# Patient Record
Sex: MALE | Race: BLACK OR AFRICAN AMERICAN | Employment: OTHER | URBAN - METROPOLITAN AREA
[De-identification: names, ages, dates, MRNs, and addresses within clinical notes are randomized per-mention and may not be internally consistent; named-entity substitution may affect disease eponyms.]

---

## 2017-01-31 ENCOUNTER — OFFICE VISIT (OUTPATIENT)
Dept: ORTHOPEDIC SURGERY | Age: 57
End: 2017-01-31

## 2017-01-31 VITALS
OXYGEN SATURATION: 97 % | BODY MASS INDEX: 25.81 KG/M2 | HEART RATE: 82 BPM | SYSTOLIC BLOOD PRESSURE: 151 MMHG | DIASTOLIC BLOOD PRESSURE: 89 MMHG | RESPIRATION RATE: 18 BRPM | HEIGHT: 66 IN | WEIGHT: 160.6 LBS | TEMPERATURE: 98.8 F

## 2017-01-31 DIAGNOSIS — M48.061 LUMBAR SPINAL STENOSIS: Primary | ICD-10-CM

## 2017-01-31 DIAGNOSIS — Z98.1 S/P CERVICAL SPINAL FUSION: ICD-10-CM

## 2017-01-31 RX ORDER — TRAMADOL HYDROCHLORIDE 50 MG/1
50 TABLET ORAL
COMMUNITY
Start: 2011-03-27 | End: 2017-03-02

## 2017-01-31 RX ORDER — LISINOPRIL 20 MG/1
20 TABLET ORAL
COMMUNITY
Start: 2010-07-20 | End: 2017-03-02

## 2017-01-31 RX ORDER — OXYCODONE HYDROCHLORIDE 5 MG/1
TABLET ORAL
Qty: 60 TAB | Refills: 0 | Status: SHIPPED | OUTPATIENT
Start: 2017-01-31 | End: 2017-05-15 | Stop reason: SDUPTHER

## 2017-01-31 RX ORDER — CYCLOBENZAPRINE HCL 10 MG
10 TABLET ORAL
COMMUNITY
Start: 2011-03-27 | End: 2017-12-04 | Stop reason: SDUPTHER

## 2017-01-31 RX ORDER — AMLODIPINE BESYLATE 10 MG/1
10 TABLET ORAL
COMMUNITY
Start: 2010-07-20 | End: 2017-03-02

## 2017-01-31 RX ORDER — AMLODIPINE BESYLATE 5 MG/1
10 TABLET ORAL
COMMUNITY
End: 2017-03-02

## 2017-01-31 RX ORDER — HYDROCHLOROTHIAZIDE 25 MG/1
25 TABLET ORAL
COMMUNITY
Start: 2010-10-03 | End: 2017-03-02

## 2017-01-31 NOTE — PATIENT INSTRUCTIONS
[employer] Activation    Thank you for requesting access to [employer]. Please follow the instructions below to securely access and download your online medical record. [employer] allows you to send messages to your doctor, view your test results, renew your prescriptions, schedule appointments, and more. How Do I Sign Up? 1. In your internet browser, go to [email or web address]  2. Click on the First Time User? Click Here link in the Sign In box. You will be redirect to the New Member Sign Up page. 3. Enter your [employer] Access Code exactly as it appears below. You will not need to use this code after youve completed the sign-up process. If you do not sign up before the expiration date, you must request a new code. [employer] Access Code: [email or web address]  Expires: 3/30/2017  9:35 AM (This is the date your [employer] access code will )    4. Enter the last four digits of your Social Security Number (xxxx) and Date of Birth (mm/dd/yyyy) as indicated and click Submit. You will be taken to the next sign-up page. 5. Create a [employer]ics ID. This will be your [employer] login ID and cannot be changed, so think of one that is secure and easy to remember. 6. Create a [employer] password. You can change your password at any time. 7. Enter your Password Reset Question and Answer. This can be used at a later time if you forget your password. 8. Enter your e-mail address. You will receive e-mail notification when new information is available in [address] 9. Click Sign Up. You can now view and download portions of your medical record. 10. Click the Download Summary menu link to download a portable copy of your medical information. Additional Information    If you have questions, please visit the Frequently Asked Questions section of the [employer] website at [email or web address] Remember, [employer] is NOT to be used for urgent needs. For medical emergencies, dial 911.
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
## 2017-02-01 ENCOUNTER — TELEPHONE (OUTPATIENT)
Dept: ORTHOPEDIC SURGERY | Age: 57
End: 2017-02-01

## 2017-02-01 NOTE — TELEPHONE ENCOUNTER
Needs up dated handicap von. Patient  17. Patient will be bringing form by office will need it signed same day.

## 2017-02-01 NOTE — TELEPHONE ENCOUNTER
I do not see where we gave him a handicap placard in the past.  Please review record and see if you can find it.

## 2017-02-01 NOTE — TELEPHONE ENCOUNTER
Called pt and informed he can  DMV handicap placard application tomorrow after 12 pm (02/02/17 @ 12 pm). Pt verbalized understanding. Need to obtain NP Amie Cardona's signature tomorrow, placed on her desk to sign.

## 2017-02-01 NOTE — TELEPHONE ENCOUNTER
Actually on further review of the chart, it reveals he ambulates with a single point cane. Ok for temp handicap placard.

## 2017-02-01 NOTE — TELEPHONE ENCOUNTER
Reviewed record prior to sending message, no scanned copy found and no documentation noted. Office visit notes only reflects ambulatory status.

## 2017-02-01 NOTE — TELEPHONE ENCOUNTER
Called pt to inquire of who last provided him with the DMV handicap placard application. Pt stated Dr. Ale Adhikari with [employer] last gave him the placard application. Pt states he can no longer see her due to having Medicare now and he does not have a PCP at this time. He also stated he did not know that the placard had  when he saw Dr. Greg Rolle yesterday.

## 2017-02-02 NOTE — TELEPHONE ENCOUNTER
Received signed DMV handicap placard application from LEONIDAS Lira, placed at  ([facility]) for pt .

## 2017-03-07 ENCOUNTER — DOCUMENTATION ONLY (OUTPATIENT)
Dept: ORTHOPEDIC SURGERY | Age: 57
End: 2017-03-07

## 2017-03-07 ENCOUNTER — APPOINTMENT (OUTPATIENT)
Dept: GENERAL RADIOLOGY | Age: 57
End: 2017-03-07
Attending: PHYSICAL MEDICINE & REHABILITATION
Payer: MEDICARE

## 2017-03-07 ENCOUNTER — HOSPITAL ENCOUNTER (EMERGENCY)
Age: 57
Discharge: HOME | End: 2017-03-07
Attending: EMERGENCY MEDICINE
Payer: MEDICARE

## 2017-03-07 ENCOUNTER — HOSPITAL ENCOUNTER (OUTPATIENT)
Age: 57
Setting detail: OUTPATIENT SURGERY
Discharge: HOME | End: 2017-03-07
Attending: PHYSICAL MEDICINE & REHABILITATION | Admitting: PHYSICAL MEDICINE & REHABILITATION
Payer: MEDICARE

## 2017-03-07 VITALS
TEMPERATURE: 98 F | HEIGHT: 66 IN | SYSTOLIC BLOOD PRESSURE: 187 MMHG | WEIGHT: 160 LBS | BODY MASS INDEX: 25.71 KG/M2 | HEART RATE: 67 BPM | OXYGEN SATURATION: 99 % | RESPIRATION RATE: 16 BRPM | DIASTOLIC BLOOD PRESSURE: 110 MMHG

## 2017-03-07 VITALS
TEMPERATURE: 99.3 F | WEIGHT: 160 LBS | HEART RATE: 99 BPM | HEIGHT: 66 IN | BODY MASS INDEX: 25.71 KG/M2 | OXYGEN SATURATION: 98 % | SYSTOLIC BLOOD PRESSURE: 179 MMHG | RESPIRATION RATE: 16 BRPM | DIASTOLIC BLOOD PRESSURE: 99 MMHG

## 2017-03-07 DIAGNOSIS — I10 ESSENTIAL HYPERTENSION: Primary | ICD-10-CM

## 2017-03-07 PROCEDURE — 74011636320 HC RX REV CODE- 636/320

## 2017-03-07 PROCEDURE — 74011250636 HC RX REV CODE- 250/636

## 2017-03-07 PROCEDURE — 99282 EMERGENCY DEPT VISIT SF MDM: CPT

## 2017-03-07 PROCEDURE — 74011250637 HC RX REV CODE- 250/637: Performed by: EMERGENCY MEDICINE

## 2017-03-07 PROCEDURE — 74011000250 HC RX REV CODE- 250

## 2017-03-07 RX ORDER — METOPROLOL TARTRATE 50 MG/1
50 TABLET ORAL
Status: COMPLETED | OUTPATIENT
Start: 2017-03-07 | End: 2017-03-07

## 2017-03-07 RX ORDER — DIAZEPAM 5 MG/1
2.5-1 TABLET ORAL ONCE
Status: DISCONTINUED | OUTPATIENT
Start: 2017-03-07 | End: 2017-03-07 | Stop reason: HOSPADM

## 2017-03-07 RX ORDER — OXYCODONE AND ACETAMINOPHEN 5; 325 MG/1; MG/1
1 TABLET ORAL
Status: COMPLETED | OUTPATIENT
Start: 2017-03-07 | End: 2017-03-07

## 2017-03-07 RX ADMIN — METOPROLOL TARTRATE 50 MG: 50 TABLET ORAL at 15:04

## 2017-03-07 RX ADMIN — OXYCODONE HYDROCHLORIDE AND ACETAMINOPHEN 1 TABLET: 5; 325 TABLET ORAL at 16:22

## 2017-03-07 NOTE — PROGRESS NOTES
ALEXANDER Jeffrey, evaluated the patient in block suite, and spoke with ED concerning light headedness and dizziness associated with his HTN. Ronny Oleary

## 2017-03-07 NOTE — ED TRIAGE NOTES
Patient to ER with c/o HTN. States he was at the [facility], was going to get a spinal injection for chronic back pain, but his BP was too elevated, so he was sent here. Patient denies any associated symptoms. Ambulatory. Allergies reviewed.
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
## 2017-03-07 NOTE — PROGRESS NOTES
ER doctor from  called stating he wanted to talk to Dr. Candice Fox regarding the pt's blood pressure as he was sent to the ER from the block suite today. His b/p was elevated and could not have the block.     Called the block suite and left message with the nurse to have Dr Candice Fox call the ER doctor at [phone]
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
## 2017-03-07 NOTE — DISCHARGE INSTRUCTIONS
[facility]   ([facility])  Dr. Yun Oconnor, Dr. Yandy Franco, Dr. Vanessa Whitley not drive a car, operate heavy machinery or dangerous equipment for 24 hours. * Activity as tolerated; rest for the remainder of the day. * Resume pre-block medications including those for your family doctor. * Do not drink alcoholic beverages for 24 hours. Alcohol and the medications you have received may interact and cause an adverse reaction. * You may feel better this evening and worse tomorrow, as the numbing medications wears off and the steroid has yet to begin to work. After 48 hrs the steroid should begin to release bringing you relief. * You may shower this evening and remove any bandages. * Avoid hot tubs and heating pads for 24 hours. You may use cold packs on the procedure site as tolerated for the first 24 hours. * If a headache develops, drink plenty of fluids and rest.  Take over the counter medications for headache if needed. If the headache continues longer than 24 hours, call MD at the [address] [place] [address]. [phone]    * Continue taking pain medications as needed. * You may resume your regular diet if tolerated. Otherwise, start with sips of water and advance slowly. * If Diabetic: check your blood sugar three times a day for the next 3 days. If your sugar is greater than 300 call your family doctor. If your sugar is greater than 400, have someone transport you to the nearest Emergency Room. * If you experience any of the following problems, Please Call the [address] [place] [address] at [phone].         * Shortness of Breath    * Fever of 101 or higher    * Nausea / Vomiting    * Severe Headache    * Weakness or numbness in arms or legs that is not      resolving    * Prolonged increase in pain greater than 4 days      DISCHARGE SUMMARY from Nurse      PATIENT INSTRUCTIONS:    After oral sedation, for 24 hours or while taking prescription Narcotics:  · Limit your activities  · Do not drive and operate hazardous machinery  · Do not make important personal or business decisions  · Do  not drink alcoholic beverages  · If you have not urinated within 8 hours after discharge, please contact your surgeon on call. Report the following to your surgeon:  · Excessive pain, swelling, redness or odor of or around the surgical area  · Temperature over 101  · Nausea and vomiting lasting longer than 4 hours or if unable to take medications  · Any signs of decreased circulation or nerve impairment to extremity: change in color, persistent  numbness, tingling, coldness or increase pain  · Any questions            What to do at Home:  Recommended activity: Activity as tolerated, NO DRIVING FOR 12 Hours post injection          *  Please give a list of your current medications to your Primary Care Provider. *  Please update this list whenever your medications are discontinued, doses are      changed, or new medications (including over-the-counter products) are added. *  Please carry medication information at all times in case of emergency situations. These are general instructions for a healthy lifestyle:    No smoking/ No tobacco products/ Avoid exposure to second hand smoke    Surgeon General's Warning:  Quitting smoking now greatly reduces serious risk to your health. Obesity, smoking, and sedentary lifestyle greatly increases your risk for illness    A healthy diet, regular physical exercise & weight monitoring are important for maintaining a healthy lifestyle    You may be retaining fluid if you have a history of heart failure or if you experience any of the following symptoms:  Weight gain of 3 pounds or more overnight or 5 pounds in a week, increased swelling in our hands or feet or shortness of breath while lying flat in bed.   Please call your doctor as soon as you notice any of these symptoms; do not wait until your next office visit. Recognize signs and symptoms of STROKE:    F-face looks uneven    A-arms unable to move or move unevenly    S-speech slurred or non-existent    T-time-call 911 as soon as signs and symptoms begin-DO NOT go       Back to bed or wait to see if you get better-TIME IS BRAIN.

## 2017-03-07 NOTE — ED NOTES
Dr Abbe Figueroa notified of patient's blood pressure reading. Doctor Abbe Figueroa states to go ahead and discharge patient.

## 2017-03-07 NOTE — PERIOP NOTES
Pt blood pressure taken in preop. Blood pressures are as follows: 172/114, 179/99, and 160/105. Pt reports taking his blood pressure medication this am. He also reports feeling dizzy. Pt advised to go to the emergency room.

## 2017-03-07 NOTE — DISCHARGE INSTRUCTIONS

## 2017-03-08 ENCOUNTER — PATIENT OUTREACH (OUTPATIENT)
Dept: FAMILY MEDICINE CLINIC | Age: 57
End: 2017-03-08

## 2017-03-08 NOTE — PROGRESS NOTES
ED Follow Up Call    Called patient on 3/8/17 for discharge from [facility] for Essential hypertension. Patient reports he still feel a little dizzy and lightheaded by better from yesterday. Pt is taking his BP medication. Not prescribed anything new by the ED. Notified pt that because he has Medicare, we cannot be his PCP anymore. Gave pt numbers to several [facility] PCP offices in his area to connect with a new PCP. Discussed and reviewed ED discharge plan with patient.

## 2017-03-09 ENCOUNTER — HOSPITAL ENCOUNTER (OUTPATIENT)
Dept: LAB | Age: 57
Discharge: HOME | End: 2017-03-09
Payer: MEDICARE

## 2017-03-09 DIAGNOSIS — Z00.01 ENCOUNTER FOR GENERAL ADULT MEDICAL EXAMINATION WITH ABNORMAL FINDINGS: ICD-10-CM

## 2017-03-09 DIAGNOSIS — E78.00 HYPERCHOLESTEROLEMIA: ICD-10-CM

## 2017-03-09 DIAGNOSIS — M43.02 SPONDYLOLYSIS OF CERVICAL REGION: ICD-10-CM

## 2017-03-09 DIAGNOSIS — M51.37 DEGENERATION OF LUMBAR OR LUMBOSACRAL INTERVERTEBRAL DISC: ICD-10-CM

## 2017-03-09 DIAGNOSIS — K21.9 ESOPHAGEAL REFLUX: ICD-10-CM

## 2017-03-09 DIAGNOSIS — I10 ESSENTIAL HYPERTENSION, BENIGN: ICD-10-CM

## 2017-03-09 LAB
ALBUMIN SERPL BCP-MCNC: 3.8 G/DL (ref 3.4–5)
ALBUMIN/GLOB SERPL: 1.1 {RATIO} (ref 0.8–1.7)
ALP SERPL-CCNC: 80 U/L (ref 45–117)
ALT SERPL-CCNC: 85 U/L (ref 16–61)
ANION GAP BLD CALC-SCNC: 6 MMOL/L (ref 3–18)
AST SERPL W P-5'-P-CCNC: 72 U/L (ref 15–37)
BASOPHILS # BLD AUTO: 0 K/UL (ref 0–0.1)
BASOPHILS # BLD: 1 % (ref 0–2)
BILIRUB SERPL-MCNC: 1.5 MG/DL (ref 0.2–1)
BUN SERPL-MCNC: 6 MG/DL (ref 7–18)
BUN/CREAT SERPL: 7 (ref 12–20)
CALCIUM SERPL-MCNC: 9.5 MG/DL (ref 8.5–10.1)
CHLORIDE SERPL-SCNC: 96 MMOL/L (ref 100–108)
CHOLEST SERPL-MCNC: 187 MG/DL
CO2 SERPL-SCNC: 34 MMOL/L (ref 21–32)
CREAT SERPL-MCNC: 0.86 MG/DL (ref 0.6–1.3)
DIFFERENTIAL METHOD BLD: ABNORMAL
EOSINOPHIL # BLD: 0.3 K/UL (ref 0–0.4)
EOSINOPHIL NFR BLD: 3 % (ref 0–5)
ERYTHROCYTE [DISTWIDTH] IN BLOOD BY AUTOMATED COUNT: 13.6 % (ref 11.6–14.5)
GLOBULIN SER CALC-MCNC: 3.5 G/DL (ref 2–4)
GLUCOSE SERPL-MCNC: 146 MG/DL (ref 74–99)
HCT VFR BLD AUTO: 43.5 % (ref 36–48)
HDLC SERPL-MCNC: 67 MG/DL (ref 40–60)
HDLC SERPL: 2.8 {RATIO} (ref 0–5)
HGB BLD-MCNC: 15.2 G/DL (ref 13–16)
LDLC SERPL CALC-MCNC: 109.4 MG/DL (ref 0–100)
LIPID PROFILE,FLP: ABNORMAL
LYMPHOCYTES # BLD AUTO: 35 % (ref 21–52)
LYMPHOCYTES # BLD: 2.9 K/UL (ref 0.9–3.6)
MCH RBC QN AUTO: 32.6 PG (ref 24–34)
MCHC RBC AUTO-ENTMCNC: 34.9 G/DL (ref 31–37)
MCV RBC AUTO: 93.3 FL (ref 74–97)
MONOCYTES # BLD: 0.7 K/UL (ref 0.05–1.2)
MONOCYTES NFR BLD AUTO: 9 % (ref 3–10)
NEUTS SEG # BLD: 4.4 K/UL (ref 1.8–8)
NEUTS SEG NFR BLD AUTO: 52 % (ref 40–73)
PLATELET # BLD AUTO: 177 K/UL (ref 135–420)
PMV BLD AUTO: 10.9 FL (ref 9.2–11.8)
POTASSIUM SERPL-SCNC: 4.3 MMOL/L (ref 3.5–5.5)
PROT SERPL-MCNC: 7.3 G/DL (ref 6.4–8.2)
RBC # BLD AUTO: 4.66 M/UL (ref 4.7–5.5)
SODIUM SERPL-SCNC: 136 MMOL/L (ref 136–145)
T4 SERPL-MCNC: 12.7 UG/DL (ref 4.5–10.9)
TRIGL SERPL-MCNC: 53 MG/DL (ref ?–150)
TSH SERPL DL<=0.05 MIU/L-ACNC: 1.42 UIU/ML (ref 0.36–3.74)
VLDLC SERPL CALC-MCNC: 10.6 MG/DL
WBC # BLD AUTO: 8.3 K/UL (ref 4.6–13.2)

## 2017-03-09 PROCEDURE — 80061 LIPID PANEL: CPT | Performed by: INTERNAL MEDICINE

## 2017-03-09 PROCEDURE — 36415 COLL VENOUS BLD VENIPUNCTURE: CPT | Performed by: INTERNAL MEDICINE

## 2017-03-09 PROCEDURE — 80053 COMPREHEN METABOLIC PANEL: CPT | Performed by: INTERNAL MEDICINE

## 2017-03-09 PROCEDURE — 84436 ASSAY OF TOTAL THYROXINE: CPT | Performed by: INTERNAL MEDICINE

## 2017-03-09 PROCEDURE — 84443 ASSAY THYROID STIM HORMONE: CPT | Performed by: INTERNAL MEDICINE

## 2017-03-09 PROCEDURE — 85025 COMPLETE CBC W/AUTO DIFF WBC: CPT | Performed by: INTERNAL MEDICINE

## 2017-03-14 ENCOUNTER — APPOINTMENT (OUTPATIENT)
Dept: GENERAL RADIOLOGY | Age: 57
End: 2017-03-14
Attending: PHYSICAL MEDICINE & REHABILITATION
Payer: MEDICARE

## 2017-03-14 ENCOUNTER — SURGERY (OUTPATIENT)
Age: 57
End: 2017-03-14

## 2017-03-14 ENCOUNTER — HOSPITAL ENCOUNTER (OUTPATIENT)
Age: 57
Setting detail: OUTPATIENT SURGERY
Discharge: HOME | End: 2017-03-14
Attending: PHYSICAL MEDICINE & REHABILITATION | Admitting: PHYSICAL MEDICINE & REHABILITATION
Payer: MEDICARE

## 2017-03-14 VITALS
OXYGEN SATURATION: 99 % | SYSTOLIC BLOOD PRESSURE: 145 MMHG | HEIGHT: 66 IN | RESPIRATION RATE: 18 BRPM | TEMPERATURE: 98.8 F | DIASTOLIC BLOOD PRESSURE: 89 MMHG | WEIGHT: 160 LBS | HEART RATE: 103 BPM | BODY MASS INDEX: 25.71 KG/M2

## 2017-03-14 PROCEDURE — 77030014124 HC TY EPDRL BBMI -A: Performed by: PHYSICAL MEDICINE & REHABILITATION

## 2017-03-14 PROCEDURE — 74011000250 HC RX REV CODE- 250: Performed by: PHYSICAL MEDICINE & REHABILITATION

## 2017-03-14 PROCEDURE — 74011250636 HC RX REV CODE- 250/636: Performed by: PHYSICAL MEDICINE & REHABILITATION

## 2017-03-14 PROCEDURE — 74011250637 HC RX REV CODE- 250/637: Performed by: PHYSICAL MEDICINE & REHABILITATION

## 2017-03-14 PROCEDURE — 74011636320 HC RX REV CODE- 636/320

## 2017-03-14 PROCEDURE — 74011636320 HC RX REV CODE- 636/320: Performed by: PHYSICAL MEDICINE & REHABILITATION

## 2017-03-14 PROCEDURE — 76010000009 HC PAIN MGT 0 TO 30 MIN PROC: Performed by: PHYSICAL MEDICINE & REHABILITATION

## 2017-03-14 PROCEDURE — 74011250636 HC RX REV CODE- 250/636

## 2017-03-14 PROCEDURE — 74011000250 HC RX REV CODE- 250

## 2017-03-14 RX ORDER — LIDOCAINE HYDROCHLORIDE 10 MG/ML
INJECTION, SOLUTION EPIDURAL; INFILTRATION; INTRACAUDAL; PERINEURAL AS NEEDED
Status: DISCONTINUED | OUTPATIENT
Start: 2017-03-14 | End: 2017-03-14 | Stop reason: HOSPADM

## 2017-03-14 RX ORDER — DIAZEPAM 5 MG/1
5-20 TABLET ORAL ONCE
Status: COMPLETED | OUTPATIENT
Start: 2017-03-14 | End: 2017-03-14

## 2017-03-14 RX ORDER — DEXAMETHASONE SODIUM PHOSPHATE 100 MG/10ML
INJECTION INTRAMUSCULAR; INTRAVENOUS AS NEEDED
Status: DISCONTINUED | OUTPATIENT
Start: 2017-03-14 | End: 2017-03-14 | Stop reason: HOSPADM

## 2017-03-14 RX ORDER — SODIUM CHLORIDE 0.9 % (FLUSH) 0.9 %
5-10 SYRINGE (ML) INJECTION AS NEEDED
Status: DISCONTINUED | OUTPATIENT
Start: 2017-03-14 | End: 2017-03-14 | Stop reason: HOSPADM

## 2017-03-14 RX ADMIN — DEXAMETHASONE SODIUM PHOSPHATE 30 MG: 10 INJECTION INTRAMUSCULAR; INTRAVENOUS at 15:47

## 2017-03-14 RX ADMIN — IOPAMIDOL 2 ML: 408 INJECTION, SOLUTION INTRATHECAL at 15:47

## 2017-03-14 RX ADMIN — DIAZEPAM 5 MG: 5 TABLET ORAL at 14:38

## 2017-03-14 RX ADMIN — LIDOCAINE HYDROCHLORIDE 8 ML: 10 INJECTION, SOLUTION EPIDURAL; INFILTRATION; INTRACAUDAL; PERINEURAL at 15:46

## 2017-03-14 NOTE — INTERVAL H&P NOTE
H&P Update:  Debra Ramos. was seen and briefly examined. ED events from last week reviewed. Asymptomatic today. Will proceed with BO. History and physical has been reviewed. There have been no significant clinical changes since the completion of the originally dated History and Physical other than noted above.     Signed By: Avani Barrios MD     March 14, 2017 3:19 PM

## 2017-03-14 NOTE — PROCEDURES
Intralaminar Epidural Steroid Procedure Note        Patient Name   Claudia Mattson. Date of Procedure: March 14, 2017    Preoperative Diagnosis: Bilateral stenosis of lateral recess of lumbar spine [M48.06]    Postoperative Diagnosis: Same      Procedure:  Epidural Steroid Injection    Consent:  Informed consent was obtained prior to the procedure. In addition to the potential risks associated with the procedure itself, the patient was informed both verbally and in writing of the potential side effects of the use of glucocorticoid. The patient appeared to comprehend the informed consent and desired to have the procedure performed. Procedure in Detail:  The patient was taken to the procedure suite and placed in the prone position on the operating table on appropriate padding. The posterior lumbar region was prepped and draped in the usual sterile fashion. Intraoperative fluoroscopy was used to localize the L2-L3 interspace. The skin was infiltrated with 1% lidocaine. An 18-gauge Tuohy needle was advanced into the epidural space at L2-L3 under fluoroscopic guidance using the loss of resistance technique. No cerebrospinal fluid was seen throughout the procedure. Yes  A small amount of Isovue was injected into the epidural space, confirming appropriated needle placement on fluoroscopy. No vascular uptake was identified. Next, 2ml of 1% Lidocaine and 30mg of preservative free Dexamethasone were injected via the Tuohy needle. The needle was removed from the patient. The patient tolerated the procedure well and was discharged home with designated  and care instructions.       Signed By: Kvng Rao MD                        March 14, 2017

## 2017-03-14 NOTE — DISCHARGE INSTRUCTIONS
[facility]   ([facility])  Dr. Aranza Fregoso, Dr. Sam Duncan, Dr. Vannessa Diaz not drive a car, operate heavy machinery or dangerous equipment for 24 hours. * Activity as tolerated; rest for the remainder of the day. * Resume pre-block medications including those for your family doctor. * Do not drink alcoholic beverages for 24 hours. Alcohol and the medications you have received may interact and cause an adverse reaction. * You may feel better this evening and worse tomorrow, as the numbing medications wears off and the steroid has yet to begin to work. After 48 hrs the steroid should begin to release bringing you relief. * You may shower this evening and remove any bandages. * Avoid hot tubs and heating pads for 24 hours. You may use cold packs on the procedure site as tolerated for the first 24 hours. * If a headache develops, drink plenty of fluids and rest.  Take over the counter medications for headache if needed. If the headache continues longer than 24 hours, call MD at the [address] [place] [address]. [phone]    * Continue taking pain medications as needed. * You may resume your regular diet if tolerated. Otherwise, start with sips of water and advance slowly. * If Diabetic: check your blood sugar three times a day for the next 3 days. If your sugar is greater than 300 call your family doctor. If your sugar is greater than 400, have someone transport you to the nearest Emergency Room. * If you experience any of the following problems, Please Call the [address] [place] [address] at [phone].         * Shortness of Breath    * Fever of 101 or higher    * Nausea / Vomiting    * Severe Headache    * Weakness or numbness in arms or legs that is not      resolving    * Prolonged increase in pain greater than 4 days      DISCHARGE SUMMARY from Nurse      PATIENT INSTRUCTIONS:    After oral sedation, for 24 hours or while taking prescription Narcotics:  · Limit your activities  · Do not drive and operate hazardous machinery  · Do not make important personal or business decisions  · Do  not drink alcoholic beverages  · If you have not urinated within 8 hours after discharge, please contact your surgeon on call. Report the following to your surgeon:  · Excessive pain, swelling, redness or odor of or around the surgical area  · Temperature over 101  · Nausea and vomiting lasting longer than 4 hours or if unable to take medications  · Any signs of decreased circulation or nerve impairment to extremity: change in color, persistent  numbness, tingling, coldness or increase pain  · Any questions            What to do at Home:  Recommended activity: Activity as tolerated, NO DRIVING FOR 12 Hours post injection          *  Please give a list of your current medications to your Primary Care Provider. *  Please update this list whenever your medications are discontinued, doses are      changed, or new medications (including over-the-counter products) are added. *  Please carry medication information at all times in case of emergency situations. These are general instructions for a healthy lifestyle:    No smoking/ No tobacco products/ Avoid exposure to second hand smoke    Surgeon General's Warning:  Quitting smoking now greatly reduces serious risk to your health. Obesity, smoking, and sedentary lifestyle greatly increases your risk for illness    A healthy diet, regular physical exercise & weight monitoring are important for maintaining a healthy lifestyle    You may be retaining fluid if you have a history of heart failure or if you experience any of the following symptoms:  Weight gain of 3 pounds or more overnight or 5 pounds in a week, increased swelling in our hands or feet or shortness of breath while lying flat in bed.   Please call your doctor as soon as you notice any of these symptoms; do not wait until your next office visit. Recognize signs and symptoms of STROKE:    F-face looks uneven    A-arms unable to move or move unevenly    S-speech slurred or non-existent    T-time-call 911 as soon as signs and symptoms begin-DO NOT go       Back to bed or wait to see if you get better-TIME IS BRAIN. MyChart Activation    Thank you for requesting access to [employer]. Please follow the instructions below to securely access and download your online medical record. [employer] allows you to send messages to your doctor, view your test results, renew your prescriptions, schedule appointments, and more. How Do I Sign Up? 1. In your internet browser, go to [email or web address]  2. Click on the First Time User? Click Here link in the Sign In box. You will be redirect to the New Member Sign Up page. 3. Enter your [employer] Access Code exactly as it appears below. You will not need to use this code after youve completed the sign-up process. If you do not sign up before the expiration date, you must request a new code. [employer] Access Code: [email or web address]  Expires: 3/30/2017 10:35 AM (This is the date your [employer] access code will )    4. Enter the last four digits of your Social Security Number (xxxx) and Date of Birth (mm/dd/yyyy) as indicated and click Submit. You will be taken to the next sign-up page. 5. Create a [employer] ID. This will be your [employer] login ID and cannot be changed, so think of one that is secure and easy to remember. 6. Create a [employer] password. You can change your password at any time. 7. Enter your Password Reset Question and Answer. This can be used at a later time if you forget your password. 8. Enter your e-mail address. You will receive e-mail notification when new information is available in [address] 9. Click Sign Up. You can now view and download portions of your medical record.   10. Click the Download Summary menu link to download a portable copy of your medical information. Additional Information    If you have questions, please visit the Frequently Asked Questions section of the [employer] website at [email or web address] Remember, [employer] is NOT to be used for urgent needs. For medical emergencies, dial 911.
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
## 2017-05-15 ENCOUNTER — OFFICE VISIT (OUTPATIENT)
Dept: ORTHOPEDIC SURGERY | Age: 57
End: 2017-05-15

## 2017-05-15 VITALS
TEMPERATURE: 98.8 F | RESPIRATION RATE: 18 BRPM | HEIGHT: 66 IN | BODY MASS INDEX: 25.71 KG/M2 | HEART RATE: 97 BPM | WEIGHT: 160 LBS | DIASTOLIC BLOOD PRESSURE: 86 MMHG | SYSTOLIC BLOOD PRESSURE: 143 MMHG

## 2017-05-15 DIAGNOSIS — M54.2 NECK PAIN: ICD-10-CM

## 2017-05-15 DIAGNOSIS — M48.062 LUMBAR STENOSIS WITH NEUROGENIC CLAUDICATION: Primary | ICD-10-CM

## 2017-05-15 DIAGNOSIS — Z98.1 S/P CERVICAL SPINAL FUSION: ICD-10-CM

## 2017-05-15 RX ORDER — DULOXETIN HYDROCHLORIDE 30 MG/1
30 CAPSULE, DELAYED RELEASE ORAL
Qty: 30 CAP | Refills: 2 | Status: SHIPPED | OUTPATIENT
Start: 2017-05-15 | End: 2017-07-05 | Stop reason: SDUPTHER

## 2017-05-15 RX ORDER — OXYCODONE HYDROCHLORIDE 5 MG/1
TABLET ORAL
Qty: 60 TAB | Refills: 0 | Status: SHIPPED | OUTPATIENT
Start: 2017-05-15 | End: 2017-07-05 | Stop reason: SDUPTHER

## 2017-05-15 NOTE — PROGRESS NOTES
Verbal order entered per Dr. Susi Cheatham as documented on blue sheet:Cymbalta 30mg take 1 tab PO with dinner
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
## 2017-05-15 NOTE — PATIENT INSTRUCTIONS
Deciding About Surgery for Lumbar Spinal Stenosis  What is lumbar spinal stenosis? Lumbar spinal stenosis is the narrowing of the spinal canal in the lower back. This occurs when bone and other tissues grow inside the openings in the spinal bones. This can squeeze the nerves that branch out from the spinal cord. The squeezing can cause pain, numbness, or weakness. It happens most often in the legs, feet, or buttocks. You may choose to have surgery to ease your symptoms. Or you may try other treatments instead. These include medicines, exercise, and physical therapy. What are key points about this decision? · If your symptoms are mild to moderate, then medicine, physical therapy, and exercise may be all you need. · You may want surgery if you have tried other treatment for a while and your symptoms are still so bad that you can't do your normal activities. · Your symptoms may come back after a few years. You may need surgery again. · Surgery will most likely help leg pain. But it may not help back pain as much. Why might you choose to have surgery? · Surgery can relieve pain. It can also improve how well you can walk. · You have tried other treatment for a while and your symptoms are still so bad that you can't do your normal activities. · Without surgery, your symptoms may still bother you. If symptoms are very painful, they most often will not improve on their own. · Your doctor may advise surgery if you can't control your bladder or bowels as well as you used to. Surgery is also an option if you notice sudden changes in how well you can walk or you are more clumsy than before. Why might you choose not to have surgery? · You may try other treatments to help your symptoms. These include medicine, exercise, and physical therapy. These other treatments work best for people with mild to moderate symptoms. · Risks from surgery include nerve injury and tissue tears.  And you could have chronic pain, trouble passing urine, and a spine that is not stable. · All surgery has some risks. These include bleeding, infection, and risks from anesthesia. · You may not be able to return to all of your normal activities for many months. · Your symptoms may come back in a few years. You may need surgery again. Your decision  Thinking about the facts and your feelings can help you make a decision that is right for you. Be sure you understand the benefits and risks of your options, and think about what else you need to do before you make the decision. Where can you learn more? Go to http://zulma-susana.info/. Enter Y784 in the search box to learn more about \"Deciding About Surgery for Lumbar Spinal Stenosis. \"  Current as of: May 23, 2016  Content Version: 11.2  © 0656-7805 [employer]. Care instructions adapted under license by [employer] (which disclaims liability or warranty for this information). If you have questions about a medical condition or this instruction, always ask your healthcare professional. [place] [address] any warranty or liability for your use of this information.

## 2017-06-13 ENCOUNTER — APPOINTMENT (OUTPATIENT)
Dept: GENERAL RADIOLOGY | Age: 57
End: 2017-06-13
Attending: PHYSICAL MEDICINE & REHABILITATION
Payer: MEDICARE

## 2017-06-13 ENCOUNTER — HOSPITAL ENCOUNTER (OUTPATIENT)
Age: 57
Setting detail: OUTPATIENT SURGERY
Discharge: HOME | End: 2017-06-13
Attending: PHYSICAL MEDICINE & REHABILITATION | Admitting: PHYSICAL MEDICINE & REHABILITATION
Payer: MEDICARE

## 2017-06-13 VITALS
DIASTOLIC BLOOD PRESSURE: 101 MMHG | TEMPERATURE: 99.1 F | BODY MASS INDEX: 25.71 KG/M2 | RESPIRATION RATE: 18 BRPM | OXYGEN SATURATION: 100 % | HEART RATE: 108 BPM | HEIGHT: 66 IN | SYSTOLIC BLOOD PRESSURE: 154 MMHG | WEIGHT: 160 LBS

## 2017-06-13 PROCEDURE — 74011250636 HC RX REV CODE- 250/636

## 2017-06-13 PROCEDURE — 74011000250 HC RX REV CODE- 250

## 2017-06-13 PROCEDURE — 77030014124 HC TY EPDRL BBMI -A: Performed by: PHYSICAL MEDICINE & REHABILITATION

## 2017-06-13 PROCEDURE — 74011250637 HC RX REV CODE- 250/637: Performed by: PHYSICAL MEDICINE & REHABILITATION

## 2017-06-13 PROCEDURE — 74011636320 HC RX REV CODE- 636/320

## 2017-06-13 PROCEDURE — 76010000009 HC PAIN MGT 0 TO 30 MIN PROC: Performed by: PHYSICAL MEDICINE & REHABILITATION

## 2017-06-13 RX ORDER — DIAZEPAM 5 MG/1
2.5-1 TABLET ORAL ONCE
Status: COMPLETED | OUTPATIENT
Start: 2017-06-13 | End: 2017-06-13

## 2017-06-13 RX ORDER — DEXAMETHASONE SODIUM PHOSPHATE 100 MG/10ML
INJECTION INTRAMUSCULAR; INTRAVENOUS AS NEEDED
Status: DISCONTINUED | OUTPATIENT
Start: 2017-06-13 | End: 2017-06-13 | Stop reason: HOSPADM

## 2017-06-13 RX ORDER — LIDOCAINE HYDROCHLORIDE 10 MG/ML
INJECTION, SOLUTION EPIDURAL; INFILTRATION; INTRACAUDAL; PERINEURAL AS NEEDED
Status: DISCONTINUED | OUTPATIENT
Start: 2017-06-13 | End: 2017-06-13 | Stop reason: HOSPADM

## 2017-06-13 RX ADMIN — DIAZEPAM 5 MG: 5 TABLET ORAL at 12:51

## 2017-06-13 NOTE — H&P (VIEW-ONLY)
[address]  [address], [address]  [place]  Phone: [phone]  Fax: [phone]        [facility]  : 1960  PCP: No primary care provider on file. PROGRESS NOTE      ASSESSMENT AND PLAN    Emma Torres was seen today for back pain. Diagnoses and all orders for this visit:    Lumbar stenosis with neurogenic claudication  -     oxyCODONE IR (ROXICODONE) 5 mg immediate release tablet; Take 1 tab by mouth 1-2 times a day as needed  for pain. S/P cervical spinal fusion- 1 year f/u    Neck pain  -     [86468] C Spine 2-3V    Other orders  -     DULoxetine (CYMBALTA) 30 mg capsule; Take 1 Cap by mouth daily (with dinner). -     SCHEDULE SURGERY    1. Trial of Cymbalta. 2. Rx for Roxicodone. 3. Set up for BO at L2-3.   4. Given surgical information for stenosis     Follow-up Disposition:  Return in about 6 weeks (around 2017) for med f/u, adjustment. HISTORY OF PRESENT ILLNESS  Joey November. is a 60 y.o. male. Pt presents to the office for a f/u visit for back pain. He has known severe stenosis and had an epidural 2 months ago at L2-3. He is 1 year out of his cervical fusion. Pt reports that his epidural did relieve some RLE pain and allowed him to ambulate better but did not give him any benefit to his back pain. He tolerated the injection well without negative side effects. Pt continues to have back pain. He notes that his back pain has been uncontrolled lately. He has been experiencing shooting pain into his RLE. He has a short standing and walking tolerance due to his pain. Pt has noticed weakness in his RLE lately. Pt reports that he has to ambulate with his cane as his RLE will go out on him with prolonged ambulation. His RLE pain is mainly felt in his calf. No saddle paresthesia. Pt was given Roxicodone 5 mg by Dr. Rosana Araujo a few months ago. Takes only prn. This gave him benefit but he has run out.  He has been given Roxicodone 10 mg by NP Mina Velez in the past. Pt denies any dizziness, confusion, uncontrolled constipation, and cravings due to controlled substances. Denies persistent fevers, chills, weight changes, neurogenic bowel or bladder symptoms. Pt denies recent ED visits or hospitalizations.  reviewed. Last fill of Roxicodone 5 mg was in 1/2017 by Dr. Lyla Herrera. Surgery for lumbar stenosis has been considered for patient. Pt wants to hold off, had cervical fusion last yr complicated by hematoma. Pain Assessment  5/15/2017   Location of Pain Back   Pain Location Comment -   Severity of Pain 9   Quality of Pain Aching   Quality of Pain Comment numbness and tingling in left hand   Duration of Pain Persistent   Frequency of Pain Constant   Aggravating Factors Other (Comment)   Aggravating Factors Comment denies aggravating factors   Limiting Behavior Some   Relieving Factors Nothing   Relieving Factors Comment -   Result of Injury No       PAST MEDICAL HISTORY   Past Medical History:   Diagnosis Date    Diabetes ([address]     pre diabetic    Hypertension     Liver disease     Hepatitis C    Low back pain     Lumbar stenosis     L3-L4, L4-L5    Migraine headache     Numbness and tingling in left hand     Radiculopathy     Right L4-L5    Right leg pain     Sensation of cold in leg     Radiating into right posterior buttock and anterior thigh    Unsteady gait        Past Surgical History:   Procedure Laterality Date    HX CERVICAL FUSION  05/23/16    ACDF C3/4 C4/5 C5/6 C6/7    HX ORTHOPAEDIC  05/2016    Cervical fusion 4 plates in neck    NEUROLOGICAL PROCEDURE UNLISTED  05/25/16    hematoma removal from cervical spine   . MEDICATIONS      Current Outpatient Prescriptions   Medication Sig Dispense Refill    oxyCODONE IR (ROXICODONE) 5 mg immediate release tablet Take 1 tab by mouth 1-2 times a day as needed  for pain.  60 Tab 0    DULoxetine (CYMBALTA) 30 mg capsule Take 1 Cap by mouth daily (with dinner). 30 Cap 2    lisinopril-hydrochlorothiazide (PRINZIDE, ZESTORETIC) 20-25 mg per tablet Take 1 Tab by mouth daily. 90 Tab 3    cyclobenzaprine (FLEXERIL) 10 mg tablet 10 mg.      HYDROcodone-acetaminophen (LORTAB) 5-500 mg per tablet Take 1 Tab by Mouth Every 4 Hours As Needed for Pain. Do not drink or drive with this medication. X#[id number]      ibuprofen (MOTRIN) 600 mg tablet Take 1 Tab by mouth every eight (8) hours as needed for Pain. 21 Tab 0        ALLERGIES    Allergies   Allergen Reactions    Percocet [Oxycodone-Acetaminophen] Itching          SOCIAL HISTORY    Social History     Social History    Marital status:      Spouse name: N/A    Number of children: N/A    Years of education: N/A     Occupational History    Not on file. Social History Main Topics    Smoking status: Former Smoker     Packs/day: 0.50     Years: 40.00     Types: Cigarettes, Pipe     Quit date: 4/17/2016    Smokeless tobacco: Never Used    Alcohol use 2.4 oz/week     4 Glasses of wine per week    Drug use: No    Sexual activity: Yes     Partners: Female     Birth control/ protection: None     Other Topics Concern    Not on file     Social History Narrative       FAMILY HISTORY    Family History   Problem Relation Age of Onset    Hypertension Mother     Heart Disease Mother     Diabetes Mother     Hypertension Father        REVIEW OF SYSTEMS  Review of Systems   Constitutional: Negative for chills, fever and weight loss. Respiratory: Negative for shortness of breath. Cardiovascular: Negative for chest pain. Gastrointestinal: Negative for constipation. Negative for fecal incontinence   Genitourinary: Negative for dysuria. Negative for urinary incontinence   Musculoskeletal:        Per HPI   Skin: Negative for rash. Neurological: Positive for focal weakness. Negative for dizziness, tingling, tremors and headaches. Endo/Heme/Allergies: Does not bruise/bleed easily. Psychiatric/Behavioral: The patient does not have insomnia. PHYSICAL EXAMINATION  Visit Vitals    /86    Pulse 97    Temp 98.8 °F (37.1 °C) (Oral)    Resp 18    Ht 5' 6\" (1.676 m)    Wt 160 lb (72.6 kg)    BMI 25.82 kg/m2         Accompanied by self. Constitutional:  Well developed, well nourished, in no acute distress. Psychiatric: Affect and mood are appropriate. Integumentary: No rashes or abrasions noted on exposed areas. Cardiovascular/Peripheral Vascular: Intact l pulses. No peripheral edema is noted. Lymphatic:  No evidence of lymphedema. No cervical lymphadenopathy. SPINE/MUSCULOSKELETAL EXAM       Lumbar spine:  No rash, ecchymosis, or gross obliquity. No fasciculations. No focal atrophy is noted. No tenderness to palpation at the sciatic notch. SI joints non-tender. Trochanters non tender. Sensation grossly intact to light touch. MOTOR:       Hip Flex  Quads Hamstrings Ankle DF EHL Ankle PF   Right +4/5 +4/5 +4/5 +4/5 +4/5 +4/5   Left +4/5 +4/5 +4/5 +4/5 +4/5 +4/5     Straight Leg raise + on the RT at 90 degrees. Ambulation with single point cane. RADIOGRAPHS  Cervical spine xray films reviewed:  1) intact hardware , solid fusion    Written by Kelton Apgar, as dictated by Eris Livingston MD.    I, Dr. Eris Livingston MD, confirm that all documentation is accurate. Mr. Rianna Haley may have a reminder for a \"due or due soon\" health maintenance. I have asked that he contact his primary care provider for follow-up on this health maintenance.

## 2017-06-13 NOTE — PROCEDURES
Intralaminar Epidural Steroid Procedure Note        Patient Name   Edgar Richey. Date of Procedure: June 13, 2017    Preoperative Diagnosis: Bilateral stenosis of lateral recess of lumbar spine [M48.06]    Postoperative Diagnosis: Same      Procedure:  Epidural Steroid Injection    Consent:  Informed consent was obtained prior to the procedure. In addition to the potential risks associated with the procedure itself, the patient was informed both verbally and in writing of the potential side effects of the use of glucocorticoid. The patient appeared to comprehend the informed consent and desired to have the procedure performed. Procedure in Detail:  The patient was taken to the procedure suite and placed in the prone position on the operating table on appropriate padding. The posterior lumbar region was prepped and draped in the usual sterile fashion. Intraoperative fluoroscopy was used to localize the L2-L3 interspace. The skin was infiltrated with 1% lidocaine. An 18-gauge Tuohy needle was advanced into the epidural space at L2-L3 under fluoroscopic guidance using the loss of resistance technique. No cerebrospinal fluid was seen throughout the procedure. Yes  A small amount of Isovue was injected into the epidural space, confirming appropriated needle placement on fluoroscopy. No vascular uptake was identified. Next, 2ml of 1% Lidocaine and 30mg of preservative free Dexamethasone were injected via the Tuohy needle. The needle was removed from the patient. The patient tolerated the procedure well and was discharged home with designated  and care instructions.       Signed By: Nahum Peralta MD                        June 13, 2017

## 2017-06-13 NOTE — INTERVAL H&P NOTE
H&P Update:  Evangelina Solares was seen and briefly examined. History and physical has been reviewed.   There have been no significant clinical changes since the completion of the originally dated History and Physical.    Signed By: Aries Hagan MD     June 13, 2017 12:59 PM

## 2017-07-03 DIAGNOSIS — M48.062 LUMBAR STENOSIS WITH NEUROGENIC CLAUDICATION: ICD-10-CM

## 2017-07-03 RX ORDER — OXYCODONE HYDROCHLORIDE 5 MG/1
TABLET ORAL
Qty: 60 TAB | Refills: 0 | Status: CANCELLED | OUTPATIENT
Start: 2017-07-03

## 2017-07-03 NOTE — TELEPHONE ENCOUNTER
Patient seen in 5/17, given #60 Roxicodone as he was using prn and had previous fill in January by Dr. Mike Bay. Now using meds more often. Has severe lumbar stenosis,   Continue cymbalta.     Needs f/u appt

## 2017-07-03 NOTE — TELEPHONE ENCOUNTER
Last Visit: 05/15/2017 with MD Celso Reddy    Next Appointment: 07/05/2017 with MD Celso Reddy   Previous Refill Encounters: 05/15/2017 per MD Celso Reddy #60     Requested Prescriptions     Pending Prescriptions Disp Refills    oxyCODONE IR (ROXICODONE) 5 mg immediate release tablet 60 Tab 0     Sig: Take 1 tab by mouth 1-2 times a day as needed  for pain.

## 2017-07-03 NOTE — TELEPHONE ENCOUNTER
Pt scheduled for follow up appt on 07/05/2017 with Dr. Gonzalez Livingston. Called pt and reminded of upcoming appt and that he will be re-evaluated at this appt for the complaints.

## 2017-07-05 ENCOUNTER — OFFICE VISIT (OUTPATIENT)
Dept: ORTHOPEDIC SURGERY | Age: 57
End: 2017-07-05

## 2017-07-05 VITALS
BODY MASS INDEX: 26.65 KG/M2 | OXYGEN SATURATION: 99 % | RESPIRATION RATE: 18 BRPM | TEMPERATURE: 98.6 F | HEIGHT: 66 IN | WEIGHT: 165.8 LBS | HEART RATE: 88 BPM | DIASTOLIC BLOOD PRESSURE: 90 MMHG | SYSTOLIC BLOOD PRESSURE: 132 MMHG

## 2017-07-05 DIAGNOSIS — M48.062 LUMBAR STENOSIS WITH NEUROGENIC CLAUDICATION: ICD-10-CM

## 2017-07-05 RX ORDER — OXYCODONE HYDROCHLORIDE 5 MG/1
TABLET ORAL
Qty: 60 TAB | Refills: 0 | Status: SHIPPED | OUTPATIENT
Start: 2017-07-05 | End: 2017-08-31 | Stop reason: SDUPTHER

## 2017-07-05 RX ORDER — DULOXETIN HYDROCHLORIDE 30 MG/1
30 CAPSULE, DELAYED RELEASE ORAL
Qty: 30 CAP | Refills: 2 | Status: SHIPPED | OUTPATIENT
Start: 2017-07-05 | End: 2017-08-31 | Stop reason: DRUGHIGH

## 2017-07-05 NOTE — PROGRESS NOTES
[address]  [address], [address]  [facility], [address]  Phone: [phone]  Fax: [phone]        Adebayo Torres  : 1960  PCP: Valentina Vasquez MD    PROGRESS NOTE      ASSESSMENT AND PLAN    Thai Blancas was seen today for back pain. Diagnoses and all orders for this visit:    Lumbar stenosis with neurogenic claudication  -     DRUG SCREEN UR - W/ CONFIRM; Future  -     oxyCODONE IR (ROXICODONE) 5 mg immediate release tablet; Take 1 tab by mouth 1-2 times a day as needed  for pain. -     DULoxetine (CYMBALTA) 30 mg capsule; Take 1 Cap by mouth daily (with dinner). 1. Pain Agreement signed today. 2. Continue Roxicodone and Cymbalta. 3. Advised to stay active as tolerated. 4. Given surgical information on stenosis. Follow-up Disposition:  Return in about 6 weeks (around 2017). HISTORY OF PRESENT ILLNESS  Brandon Weldon is a 60 y.o. male. Pt presents to the office for a f/u visit for back pain. Pt underwent an BO at L2-3 a few weeks ago. Last visit he was given a trial of Cymbalta. He had some benefit from his injection. He denies any negative side effects from his injections. Pt continues to have back pain. He reports that his BLE pain will bother him intermittently. He states that his LE muscle spasms do bother him every once and awhile. He has a short standing and walking tolerance but his pain has been eased up. He uses his cane for his RLE weakness. No saddle paresthesia. Pt takes Roxicodone 5 mg PRN. Pt denies any dizziness, confusion, uncontrolled constipation, and cravings due to controlled substances. He takes Cymbalta 30 mg qD w/benefit. Denies persistent fevers, chills, weight changes, neurogenic bowel or bladder symptoms. Pt denies recent ED visits or hospitalizations.  reviewed. Last two Rx for Roxicodone was in 2017 and 2017 #60 through this office.      Pain Assessment  2017   Location of Pain Back   Pain Location Comment -   Location Modifiers -   Severity of Pain 6   Quality of Pain Aching   Quality of Pain Comment -   Duration of Pain -   Frequency of Pain Constant   Date Pain First Started -   Aggravating Factors Walking;Standing   Aggravating Factors Comment -   Limiting Behavior -   Relieving Factors (No Data)   Relieving Factors Comment pain meds help some   Result of Injury -       PAST MEDICAL HISTORY   Past Medical History:   Diagnosis Date    Hypertension     Liver disease     Hepatitis C    Low back pain     Lumbar stenosis     L3-L4, L4-L5    Migraine headache     Numbness and tingling in left hand     Radiculopathy     Right L4-L5    Right leg pain     Sensation of cold in leg     Radiating into right posterior buttock and anterior thigh    Unsteady gait        Past Surgical History:   Procedure Laterality Date    HX CERVICAL FUSION  05/23/16    ACDF C3/4 C4/5 C5/6 C6/7    HX ORTHOPAEDIC  05/2016    Cervical fusion 4 plates in neck    NEUROLOGICAL PROCEDURE UNLISTED  05/25/16    hematoma removal from cervical spine   . MEDICATIONS    Current Outpatient Prescriptions   Medication Sig Dispense Refill    oxyCODONE IR (ROXICODONE) 5 mg immediate release tablet Take 1 tab by mouth 1-2 times a day as needed  for pain. 60 Tab 0    DULoxetine (CYMBALTA) 30 mg capsule Take 1 Cap by mouth daily (with dinner). 30 Cap 2    cyclobenzaprine (FLEXERIL) 10 mg tablet 10 mg.      ibuprofen (MOTRIN) 600 mg tablet Take 1 Tab by mouth every eight (8) hours as needed for Pain. 21 Tab 0    lisinopril-hydrochlorothiazide (PRINZIDE, ZESTORETIC) 20-25 mg per tablet Take 1 Tab by mouth daily. 90 Tab 3    HYDROcodone-acetaminophen (LORTAB) 5-500 mg per tablet Take 1 Tab by Mouth Every 4 Hours As Needed for Pain. Do not drink or drive with this medication. FDV#[id number]          ALLERGIES  Allergies   Allergen Reactions    Percocet [Oxycodone-Acetaminophen] Itching          SOCIAL HISTORY Social History     Social History    Marital status:      Spouse name: N/A    Number of children: N/A    Years of education: N/A     Occupational History    Not on file. Social History Main Topics    Smoking status: Former Smoker     Packs/day: 0.50     Years: 40.00     Types: Cigarettes, Pipe     Quit date: 4/17/2016    Smokeless tobacco: Never Used    Alcohol use 2.4 oz/week     4 Glasses of wine per week    Drug use: No    Sexual activity: Yes     Partners: Female     Birth control/ protection: None     Other Topics Concern    Not on file     Social History Narrative       FAMILY HISTORY  Family History   Problem Relation Age of Onset    Hypertension Mother     Heart Disease Mother     Diabetes Mother     Hypertension Father        REVIEW OF SYSTEMS  Review of Systems   Constitutional: Negative for chills, fever and weight loss. Respiratory: Negative for shortness of breath. Cardiovascular: Negative for chest pain. Gastrointestinal: Negative for constipation. Negative for fecal incontinence   Genitourinary: Negative for dysuria. Negative for urinary incontinence   Musculoskeletal:        Per HPI   Skin: Negative for rash. Neurological: Negative for dizziness, tingling, tremors, focal weakness and headaches. Endo/Heme/Allergies: Does not bruise/bleed easily. Psychiatric/Behavioral: The patient does not have insomnia. PHYSICAL EXAMINATION  Visit Vitals    /90    Pulse 88    Temp 98.6 °F (37 °C) (Oral)    Resp 18    Ht 5' 6\" (1.676 m)    Wt 165 lb 12.8 oz (75.2 kg)    SpO2 99%    BMI 26.76 kg/m2         Accompanied by self. Constitutional:  Well developed, well nourished, in no acute distress. Psychiatric: Affect and mood are appropriate. Integumentary: No rashes or abrasions noted on exposed areas. Cardiovascular/Peripheral Vascular: Intact l pulses. No peripheral edema is noted. Lymphatic:  No evidence of lymphedema.  No cervical lymphadenopathy. SPINE/MUSCULOSKELETAL EXAM    Lumbar spine:  No rash, ecchymosis, or gross obliquity. No fasciculations. No focal atrophy is noted. No tenderness to palpation at the sciatic notch. SI joints non-tender. Trochanters non tender. Sensation grossly intact to light touch. MOTOR:       Hip Flex  Quads Hamstrings Ankle DF EHL Ankle PF   Right +4/5 +4/5 +4/5 +4/5 +4/5 +4/5   Left +4/5 +4/5 +4/5 +4/5 +4/5 +4/5       Ambulation with single point cane. FWB. SLR -    Written by Vickie Peck, as dictated by Gustavo Bay MD.    I, Dr. Gustavo Bay MD, confirm that all documentation is accurate. Mr. Jovon Alvarez may have a reminder for a \"due or due soon\" health maintenance. I have asked that he contact his primary care provider for follow-up on this health maintenance.

## 2017-07-05 NOTE — MR AVS SNAPSHOT
Visit Information Date & Time Provider Department Dept. Phone Encounter #  
 7/5/2017  3:10  [address], MD 4 [facility], [address] and [facility] [phone] [id number] Follow-up Instructions Return in about 6 weeks (around 8/16/2017). Upcoming Health Maintenance Date Due DTaP/Tdap/Td series (1 - Tdap) 7/27/1979 FOBT Q 1 YEAR AGE 50-75 7/27/2008 INFLUENZA AGE 9 TO ADULT 8/1/2017 Allergies as of 7/5/2017  Review Complete On: 7/5/2017 By: Cristina Foster MD  
  
 Severity Noted Reaction Type Reactions Percocet [Oxycodone-acetaminophen]  07/05/2016    Itching Current Immunizations  Reviewed on 5/29/2016 Name Date Influenza Vaccine (Quad) PF 2/3/2016 Not reviewed this visit You Were Diagnosed With   
  
 Codes Comments Lumbar stenosis with neurogenic claudication     ICD-10-CM: M48.06 
ICD-9-CM: 724.03 Vitals BP Pulse Temp Resp Height(growth percentile) Weight(growth percentile) 132/90 88 98.6 °F (37 °C) (Oral) 18 5' 6\" (1.676 m) 165 lb 12.8 oz (75.2 kg) SpO2 BMI Smoking Status 99% 26.76 kg/m2 Former Smoker BMI and BSA Data Body Mass Index Body Surface Area  
 26.76 kg/m 2 1.87 m 2 Preferred Pharmacy Pharmacy Name Phone [facility] - [address] [phone] Your Updated Medication List  
  
   
This list is accurate as of: 7/5/17  3:50 PM.  Always use your most recent med list.  
  
  
  
  
 cyclobenzaprine 10 mg tablet Commonly known as:  FLEXERIL  
10 mg. DULoxetine 30 mg capsule Commonly known as:  CYMBALTA Take 1 Cap by mouth daily (with dinner). HYDROcodone-acetaminophen 5-500 mg per tablet Commonly known as:  Marlen [place] Take 1 Tab by Mouth Every 4 Hours As Needed for Pain. Do not drink or drive with this medication. EZY#[id number]  
  
 ibuprofen 600 mg tablet Commonly known as:  MOTRIN  
 Take 1 Tab by mouth every eight (8) hours as needed for Pain. lisinopril-hydroCHLOROthiazide 20-25 mg per tablet Commonly known as:  Chris Holdenk Take 1 Tab by mouth daily. oxyCODONE IR 5 mg immediate release tablet Commonly known as:  Dylan Chad Take 1 tab by mouth 1-2 times a day as needed  for pain. Prescriptions Printed Refills  
 oxyCODONE IR (ROXICODONE) 5 mg immediate release tablet 0 Sig: Take 1 tab by mouth 1-2 times a day as needed  for pain. Class: Print Prescriptions Sent to Pharmacy Refills DULoxetine (CYMBALTA) 30 mg capsule 2 Sig: Take 1 Cap by mouth daily (with dinner). Class: Normal  
 Pharmacy: [address]  #: [phone] Route: Oral  
  
Follow-up Instructions Return in about 6 weeks (around 8/16/2017). To-Do List   
 07/05/2017 Lab:  DRUG SCREEN UR - W/ CONFIRM Patient Instructions Deciding About Surgery for Lumbar Spinal Stenosis What is lumbar spinal stenosis? Lumbar spinal stenosis is the narrowing of the spinal canal in the lower back. This occurs when bone and other tissues grow inside the openings in the spinal bones. This can squeeze the nerves that branch out from the spinal cord. The squeezing can cause pain, numbness, or weakness. It happens most often in the legs, feet, or buttocks. You may choose to have surgery to ease your symptoms. Or you may try other treatments instead. These include medicines, exercise, and physical therapy. What are key points about this decision? · If your symptoms are mild to moderate, then medicine, physical therapy, and exercise may be all you need. · You may want surgery if you have tried other treatment for a while and your symptoms are still so bad that you can't do your normal activities. · Your symptoms may come back after a few years. You may need surgery again. · Surgery will most likely help leg pain. But it may not help back pain as much. Why might you choose to have surgery? · Surgery can relieve pain. It can also improve how well you can walk. · You have tried other treatment for a while and your symptoms are still so bad that you can't do your normal activities. · Without surgery, your symptoms may still bother you. If symptoms are very painful, they most often will not improve on their own. · Your doctor may advise surgery if you can't control your bladder or bowels as well as you used to. Surgery is also an option if you notice sudden changes in how well you can walk or you are more clumsy than before. Why might you choose not to have surgery? · You may try other treatments to help your symptoms. These include medicine, exercise, and physical therapy. These other treatments work best for people with mild to moderate symptoms. · Risks from surgery include nerve injury and tissue tears. And you could have chronic pain, trouble passing urine, and a spine that is not stable. · All surgery has some risks. These include bleeding, infection, and risks from anesthesia. · You may not be able to return to all of your normal activities for many months. · Your symptoms may come back in a few years. You may need surgery again. Your decision Thinking about the facts and your feelings can help you make a decision that is right for you. Be sure you understand the benefits and risks of your options, and think about what else you need to do before you make the decision. Where can you learn more? Go to http://zulma-susana.info/. Enter N495 in the search box to learn more about \"Deciding About Surgery for Lumbar Spinal Stenosis. \" Current as of: March 21, 2017 Content Version: 11.3 © 0512-2929 Walk Score.  Care instructions adapted under license by [employer] (which disclaims liability or warranty for this information). If you have questions about a medical condition or this instruction, always ask your healthcare professional. [address] any warranty or liability for your use of this information. Introducing [facility] SERVICES! Mela Hannah introduces [employer] patient portal. Now you can access parts of your medical record, email your doctor's office, and request medication refills online. 1. In your internet browser, go to [email or web address] 2. Click on the First Time User? Click Here link in the Sign In box. You will see the New Member Sign Up page. 3. Enter your [employer] Access Code exactly as it appears below. You will not need to use this code after youve completed the sign-up process. If you do not sign up before the expiration date, you must request a new code. · [employer] Access Code: [email or web address] Expires: 7/5/2017  8:20 PM 
 
4. Enter the last four digits of your Social Security Number (xxxx) and Date of Birth (mm/dd/yyyy) as indicated and click Submit. You will be taken to the next sign-up page. 5. Create a [employer] ID. This will be your [employer] login ID and cannot be changed, so think of one that is secure and easy to remember. 6. Create a [employer] password. You can change your password at any time. 7. Enter your Password Reset Question and Answer. This can be used at a later time if you forget your password. 8. Enter your e-mail address. You will receive e-mail notification when new information is available in [address] 9. Click Sign Up. You can now view and download portions of your medical record. 10. Click the Download Summary menu link to download a portable copy of your medical information. If you have questions, please visit the Frequently Asked Questions section of the [employer] website. Remember, [employer] is NOT to be used for urgent needs. For medical emergencies, dial 911. Now available from your iPhone and Android! Please provide this summary of care documentation to your next provider. Your primary care clinician is listed as Phys Other. If you have any questions after today's visit, please call [phone].

## 2017-07-05 NOTE — PATIENT INSTRUCTIONS
Deciding About Surgery for Lumbar Spinal Stenosis  What is lumbar spinal stenosis? Lumbar spinal stenosis is the narrowing of the spinal canal in the lower back. This occurs when bone and other tissues grow inside the openings in the spinal bones. This can squeeze the nerves that branch out from the spinal cord. The squeezing can cause pain, numbness, or weakness. It happens most often in the legs, feet, or buttocks. You may choose to have surgery to ease your symptoms. Or you may try other treatments instead. These include medicines, exercise, and physical therapy. What are key points about this decision? · If your symptoms are mild to moderate, then medicine, physical therapy, and exercise may be all you need. · You may want surgery if you have tried other treatment for a while and your symptoms are still so bad that you can't do your normal activities. · Your symptoms may come back after a few years. You may need surgery again. · Surgery will most likely help leg pain. But it may not help back pain as much. Why might you choose to have surgery? · Surgery can relieve pain. It can also improve how well you can walk. · You have tried other treatment for a while and your symptoms are still so bad that you can't do your normal activities. · Without surgery, your symptoms may still bother you. If symptoms are very painful, they most often will not improve on their own. · Your doctor may advise surgery if you can't control your bladder or bowels as well as you used to. Surgery is also an option if you notice sudden changes in how well you can walk or you are more clumsy than before. Why might you choose not to have surgery? · You may try other treatments to help your symptoms. These include medicine, exercise, and physical therapy. These other treatments work best for people with mild to moderate symptoms. · Risks from surgery include nerve injury and tissue tears.  And you could have chronic pain, trouble passing urine, and a spine that is not stable. · All surgery has some risks. These include bleeding, infection, and risks from anesthesia. · You may not be able to return to all of your normal activities for many months. · Your symptoms may come back in a few years. You may need surgery again. Your decision  Thinking about the facts and your feelings can help you make a decision that is right for you. Be sure you understand the benefits and risks of your options, and think about what else you need to do before you make the decision. Where can you learn more? Go to http://zulma-susana.info/. Enter P405 in the search box to learn more about \"Deciding About Surgery for Lumbar Spinal Stenosis. \"  Current as of: March 21, 2017  Content Version: 11.3  © 8383-7833 Where[employer]. Care instructions adapted under license by [employer] (which disclaims liability or warranty for this information). If you have questions about a medical condition or this instruction, always ask your healthcare professional. [place] [address] any warranty or liability for your use of this information.

## 2017-08-18 ENCOUNTER — HOSPITAL ENCOUNTER (OUTPATIENT)
Dept: LAB | Age: 57
Discharge: HOME | End: 2017-08-18
Payer: MEDICARE

## 2017-08-18 DIAGNOSIS — I10 ESSENTIAL HYPERTENSION, BENIGN: ICD-10-CM

## 2017-08-18 DIAGNOSIS — M43.02 SPONDYLOLYSIS OF CERVICAL REGION: ICD-10-CM

## 2017-08-18 DIAGNOSIS — E78.00 PURE HYPERCHOLESTEROLEMIA: ICD-10-CM

## 2017-08-18 DIAGNOSIS — K21.9 ESOPHAGEAL REFLUX: ICD-10-CM

## 2017-08-18 DIAGNOSIS — M51.37 DEGENERATION OF LUMBAR OR LUMBOSACRAL INTERVERTEBRAL DISC: ICD-10-CM

## 2017-08-18 LAB
ALBUMIN SERPL-MCNC: 3.5 G/DL (ref 3.4–5)
ALBUMIN/GLOB SERPL: 1 {RATIO} (ref 0.8–1.7)
ALP SERPL-CCNC: 70 U/L (ref 45–117)
ALT SERPL-CCNC: 60 U/L (ref 16–61)
ANION GAP SERPL CALC-SCNC: 8 MMOL/L (ref 3–18)
AST SERPL-CCNC: 53 U/L (ref 15–37)
BASOPHILS # BLD: 0.1 K/UL (ref 0–0.06)
BASOPHILS NFR BLD: 1 % (ref 0–2)
BILIRUB SERPL-MCNC: 0.4 MG/DL (ref 0.2–1)
BUN SERPL-MCNC: 14 MG/DL (ref 7–18)
BUN/CREAT SERPL: 14 (ref 12–20)
CALCIUM SERPL-MCNC: 8.5 MG/DL (ref 8.5–10.1)
CHLORIDE SERPL-SCNC: 102 MMOL/L (ref 100–108)
CHOLEST SERPL-MCNC: 193 MG/DL
CO2 SERPL-SCNC: 28 MMOL/L (ref 21–32)
CREAT SERPL-MCNC: 0.97 MG/DL (ref 0.6–1.3)
DIFFERENTIAL METHOD BLD: ABNORMAL
EOSINOPHIL # BLD: 0.3 K/UL (ref 0–0.4)
EOSINOPHIL NFR BLD: 4 % (ref 0–5)
ERYTHROCYTE [DISTWIDTH] IN BLOOD BY AUTOMATED COUNT: 12.5 % (ref 11.6–14.5)
GLOBULIN SER CALC-MCNC: 3.6 G/DL (ref 2–4)
GLUCOSE SERPL-MCNC: 116 MG/DL (ref 74–99)
HCT VFR BLD AUTO: 39 % (ref 36–48)
HDLC SERPL-MCNC: 52 MG/DL (ref 40–60)
HDLC SERPL: 3.7 {RATIO} (ref 0–5)
HGB BLD-MCNC: 13.5 G/DL (ref 13–16)
LDLC SERPL CALC-MCNC: 120.4 MG/DL (ref 0–100)
LIPID PROFILE,FLP: ABNORMAL
LYMPHOCYTES # BLD: 3 K/UL (ref 0.9–3.6)
LYMPHOCYTES NFR BLD: 36 % (ref 21–52)
MCH RBC QN AUTO: 31.8 PG (ref 24–34)
MCHC RBC AUTO-ENTMCNC: 34.6 G/DL (ref 31–37)
MCV RBC AUTO: 92 FL (ref 74–97)
MONOCYTES # BLD: 0.8 K/UL (ref 0.05–1.2)
MONOCYTES NFR BLD: 10 % (ref 3–10)
NEUTS SEG # BLD: 4.1 K/UL (ref 1.8–8)
NEUTS SEG NFR BLD: 49 % (ref 40–73)
PLATELET # BLD AUTO: 170 K/UL (ref 135–420)
PMV BLD AUTO: 9.9 FL (ref 9.2–11.8)
POTASSIUM SERPL-SCNC: 3.9 MMOL/L (ref 3.5–5.5)
PROT SERPL-MCNC: 7.1 G/DL (ref 6.4–8.2)
RBC # BLD AUTO: 4.24 M/UL (ref 4.7–5.5)
SODIUM SERPL-SCNC: 138 MMOL/L (ref 136–145)
TRIGL SERPL-MCNC: 103 MG/DL (ref ?–150)
VLDLC SERPL CALC-MCNC: 20.6 MG/DL
WBC # BLD AUTO: 8.3 K/UL (ref 4.6–13.2)

## 2017-08-18 PROCEDURE — 80053 COMPREHEN METABOLIC PANEL: CPT | Performed by: INTERNAL MEDICINE

## 2017-08-18 PROCEDURE — 36415 COLL VENOUS BLD VENIPUNCTURE: CPT | Performed by: INTERNAL MEDICINE

## 2017-08-18 PROCEDURE — 80061 LIPID PANEL: CPT | Performed by: INTERNAL MEDICINE

## 2017-08-18 PROCEDURE — 85025 COMPLETE CBC W/AUTO DIFF WBC: CPT | Performed by: INTERNAL MEDICINE

## 2017-08-31 ENCOUNTER — OFFICE VISIT (OUTPATIENT)
Dept: ORTHOPEDIC SURGERY | Age: 57
End: 2017-08-31

## 2017-08-31 VITALS
HEIGHT: 66 IN | SYSTOLIC BLOOD PRESSURE: 170 MMHG | RESPIRATION RATE: 16 BRPM | OXYGEN SATURATION: 97 % | WEIGHT: 165 LBS | BODY MASS INDEX: 26.52 KG/M2 | TEMPERATURE: 98.6 F | HEART RATE: 97 BPM | DIASTOLIC BLOOD PRESSURE: 90 MMHG

## 2017-08-31 DIAGNOSIS — M48.062 LUMBAR STENOSIS WITH NEUROGENIC CLAUDICATION: Primary | ICD-10-CM

## 2017-08-31 PROBLEM — M48.061 LUMBAR SPINAL STENOSIS: Status: ACTIVE | Noted: 2017-08-31

## 2017-08-31 RX ORDER — OXYCODONE HYDROCHLORIDE 5 MG/1
TABLET ORAL
Qty: 60 TAB | Refills: 0 | Status: SHIPPED | OUTPATIENT
Start: 2017-09-30 | End: 2017-12-04 | Stop reason: SDUPTHER

## 2017-08-31 RX ORDER — DULOXETIN HYDROCHLORIDE 60 MG/1
60 CAPSULE, DELAYED RELEASE ORAL
Qty: 30 CAP | Refills: 5 | Status: SHIPPED | OUTPATIENT
Start: 2017-08-31 | End: 2018-01-21

## 2017-08-31 RX ORDER — OXYCODONE HYDROCHLORIDE 5 MG/1
TABLET ORAL
Qty: 60 TAB | Refills: 0 | Status: SHIPPED | OUTPATIENT
Start: 2017-08-31 | End: 2017-12-04 | Stop reason: SDUPTHER

## 2017-08-31 NOTE — MR AVS SNAPSHOT
Visit Information Date & Time Provider Department Dept. Phone Encounter #  
 8/31/2017  3:[address],  [address], [address] and Spine Specialists [facility] [phone] [id number] Follow-up Instructions Return in about 3 months (around 11/30/2017) for med f/u. Your Appointments 12/4/2017  8:30 AM  
Follow Up with Cristina Foster MD  
[facility] ([address] [facility]) Appt Note: 3MO BLOCK FU; BUSTAMANTE 9/19/17  
 [address] [facility] [address]  
[phone]  
  
   
 [address] [facility] [address] Upcoming Health Maintenance Date Due DTaP/Tdap/Td series (1 - Tdap) 7/27/1979 FOBT Q 1 YEAR AGE 50-75 7/27/2008 INFLUENZA AGE 9 TO ADULT 8/1/2017 Allergies as of 8/31/2017  Review Complete On: 8/31/2017 By: Cristina Foster MD  
  
 Severity Noted Reaction Type Reactions Percocet [Oxycodone-acetaminophen]  07/05/2016    Itching Current Immunizations  Reviewed on 5/29/2016 Name Date Influenza Vaccine (Quad) PF 2/3/2016 Not reviewed this visit You Were Diagnosed With   
  
 Codes Comments Lumbar spinal stenosis    -  Primary ICD-10-CM: M48.06 
ICD-9-CM: 724.02 Lumbar stenosis with neurogenic claudication     ICD-10-CM: M48.06 
ICD-9-CM: 724.03 Vitals BP Pulse Temp Resp Height(growth percentile) Weight(growth percentile) 170/90 (BP 1 Location: Right arm, BP Patient Position: Sitting) 97 98.6 °F (37 °C) (Oral) 16 5' 6\" (1.676 m) 165 lb (74.8 kg) SpO2 BMI Smoking Status 97% 26.63 kg/m2 Former Smoker BMI and BSA Data Body Mass Index Body Surface Area  
 26.63 kg/m 2 1.87 m 2 Preferred Pharmacy Pharmacy Name Phone [facility] - [address] [phone] Your Updated Medication List  
  
   
This list is accurate as of: 8/31/17  3:50 PM.  Always use your most recent med list.  
  
  
  
  
 cyclobenzaprine 10 mg tablet Commonly known as:  FLEXERIL  
10 mg. DULoxetine 60 mg capsule Commonly known as:  CYMBALTA Take 1 Cap by mouth daily (with dinner). lisinopril-hydroCHLOROthiazide 20-25 mg per tablet Commonly known as:  Viva Deal Take 1 Tab by mouth daily. * oxyCODONE IR 5 mg immediate release tablet Commonly known as:  Danyel Tawana Take 1 tab po 1-2 times a day as needed for pain  Indications: chronic pain * oxyCODONE IR 5 mg immediate release tablet Commonly known as:  Danyel Tawana Take 1 tab by mouth 1-2 times a day as needed  for pain. Start taking on:  9/30/2017 * Notice: This list has 2 medication(s) that are the same as other medications prescribed for you. Read the directions carefully, and ask your doctor or other care provider to review them with you. Prescriptions Printed Refills  
 oxyCODONE IR (ROXICODONE) 5 mg immediate release tablet 0 Starting on: 9/30/2017 Sig: Take 1 tab by mouth 1-2 times a day as needed  for pain. Class: Print  
 oxyCODONE IR (ROXICODONE) 5 mg immediate release tablet 0 Sig: Take 1 tab po 1-2 times a day as needed for pain  Indications: chronic pain  
 Class: Print Prescriptions Sent to Pharmacy Refills DULoxetine (CYMBALTA) 60 mg capsule 5 Sig: Take 1 Cap by mouth daily (with dinner). Class: Normal  
 [address] [place] [address].  #: [phone] Route: Oral  
  
Follow-up Instructions Return in about 3 months (around 11/30/2017) for med f/u. Patient Instructions Lipoma: Care Instructions Your Care Instructions A lipoma is a growth of fat just below the skin. It may feel soft and rubbery. Lipomas can occur anywhere on the body. But they are most common on the torso, neck, upper thighs, upper arms, and armpits. A lipoma does not turn into cancer. Lipomas usually are not treated, because most of them don't hurt or cause problems. But your doctor may remove a lipoma if it is painful, gets infected, or bothers you. Follow-up care is a key part of your treatment and safety. Be sure to make and go to all appointments, and call your doctor if you are having problems. It's also a good idea to know your test results and keep a list of the medicines you take. How can you care for yourself at home? · Lipomas usually need no care at home. · If your doctor removes a lipoma, follow directions for taking care of the cut (incision). When should you call for help? Call your doctor now or seek immediate medical care if: 
· You have signs of infection, such as: 
¨ Increased pain, swelling, warmth, or redness. ¨ Red streaks leading from the lipoma. ¨ Pus draining from the lipoma. ¨ A fever. Watch closely for changes in your health, and be sure to contact your doctor if: · The lipoma is growing or changing. · You do not get better as expected. Where can you learn more? Go to http://zulma-susana.info/. Enter A223 in the search box to learn more about \"Lipoma: Care Instructions. \" Current as of: October 13, 2016 Content Version: 11.3 © 7125-4901 Totango. Care instructions adapted under license by [employer] (which disclaims liability or warranty for this information). If you have questions about a medical condition or this instruction, always ask your healthcare professional. [place] [address] any warranty or liability for your use of this information. Lumbar Spinal Stenosis: Care Instructions Your Care Instructions Stenosis in the spine is a narrowing of the canal that is around the spinal cord and nerve roots in your back. It can happen as part of aging. Sometimes bone and other tissue grow into this canal and press on the nerves that branch out from the spinal cord.  This can cause pain, numbness, and weakness. When it happens in the lower part of your back, it is called lumbar spinal stenosis. It can cause problems in the legs, feet, and rear end (buttocks). You may be able to get relief from the symptoms of spinal stenosis by taking pain medicine. Your doctor may suggest physical therapy and exercises to keep your spine strong and flexible. Some people try steroid shots to reduce swelling. If pain and numbness in your legs are still so bad that you cannot do your normal activities, you may need surgery. Follow-up care is a key part of your treatment and safety. Be sure to make and go to all appointments, and call your doctor if you are having problems. It's also a good idea to know your test results and keep a list of the medicines you take. How can you care for yourself at home? · Take an over-the-counter pain medicine, such as acetaminophen (Tylenol), ibuprofen (Advil, Motrin), or naproxen (Aleve). Be safe with medicines. Read and follow all instructions on the label. · Do not take two or more pain medicines at the same time unless the doctor told you to. Many pain medicines have acetaminophen, which is Tylenol. Too much acetaminophen (Tylenol) can be harmful. · Stay at a healthy weight. Being overweight puts extra strain on your spine. · Change positions often when you sit or stand. This can ease pain. It may also reduce pressure on the spinal cord and its nerves. · Avoid doing things that make your symptoms worse. Walking downhill and standing for a long time may cause pain. · Stretch and strengthen your back muscles as your doctor or physical therapist recommends. If your doctor says it is okay to do them, these exercises may help. ¨ Lie on your back with your knees bent. Gently pull one bent knee to your chest. Put that foot back on the floor, and then pull the other knee to your chest. 
¨ Do pelvic tilts. Lie on your back with your knees bent.  Tighten your stomach muscles. Pull your belly button (navel) in and up toward your ribs. You should feel like your back is pressing to the floor and your hips and pelvis are slightly lifting off the floor. Hold for 6 seconds while breathing smoothly. ¨ Stand with your back flat against a wall. Slowly slide down until your knees are slightly bent. Hold for 10 seconds, then slide back up the wall. · Remove or change anything in your house that may cause you to fall. Keep walkways clear of clutter, electrical cords, and throw rugs. When should you call for help? Call 911 anytime you think you may need emergency care. For example, call if: 
· You are unable to move a leg at all. Call your doctor now or seek immediate medical care if: 
· You have new or worse symptoms in your legs, belly, or buttocks. Symptoms may include: ¨ Numbness or tingling. ¨ Weakness. ¨ Pain. · You lose bladder or bowel control. Watch closely for changes in your health, and be sure to contact your doctor if: 
· You are not getting better as expected. Where can you learn more? Go to http://zulma-susana.info/. Shirley Sam in the search box to learn more about \"Lumbar Spinal Stenosis: Care Instructions. \" Current as of: March 21, 2017 Content Version: 11.3 © 6039-3851 Trove. Care instructions adapted under license by [employer] (which disclaims liability or warranty for this information). If you have questions about a medical condition or this instruction, always ask your healthcare professional. [place] [address] any warranty or liability for your use of this information. Please provide this summary of care documentation to your next provider. Your primary care clinician is listed as [employer]. If you have any questions after today's visit, please call [phone].
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
## 2017-08-31 NOTE — LETTER
[facility] Request Form for [facility] Fax to [phone]  Telephone: [phone] To be completed by Physician Office: 
 
Date: 2017    Requested by: Milly Rowan Phone No: [phone]   Fax No:  [phone] Surgery Date: 2017  Arrival Time: 12:30   Injection Time: 1:30 Provider: Tari Mendoza     
 
CPT CODE*  Procedure 84060 EPIDURAL L2/3 *CPT code is required for ALL procedures. Patient Information: 
 
Name: Jania Catherine SSN: [id number]  : 1960   Male/Female: male Home Phone No: [phone] (home) Primary Insurance: [address] [place] [address] Number: [id number] 
NO AUTH REQUIRED  
 
 
 ICD10 code(s): M48.06    Diagnosis:  LUMBAR STENOSIS WITH NEUROGENIC CLAUDICATION Diabetic/finger stick to be done BS level must be <200 mg/dl Anesthesia Type Local: Valium 10 mg PO 30 minutes prior to procedure

## 2017-08-31 NOTE — PROGRESS NOTES
Verbal order entered per Dr. Radha Rangel as documented on blue sheet:Cymbalta 60mg take 1 tab po with dinner. Disp 30 with 5 refills. Roxicodone 5mg take 1 tab po 1-2 times a day prn pain.  Disp 60 no refills

## 2017-08-31 NOTE — PATIENT INSTRUCTIONS
Lipoma: Care Instructions  Your Care Instructions  A lipoma is a growth of fat just below the skin. It may feel soft and rubbery. Lipomas can occur anywhere on the body. But they are most common on the torso, neck, upper thighs, upper arms, and armpits. A lipoma does not turn into cancer. Lipomas usually are not treated, because most of them don't hurt or cause problems. But your doctor may remove a lipoma if it is painful, gets infected, or bothers you. Follow-up care is a key part of your treatment and safety. Be sure to make and go to all appointments, and call your doctor if you are having problems. It's also a good idea to know your test results and keep a list of the medicines you take. How can you care for yourself at home? · Lipomas usually need no care at home. · If your doctor removes a lipoma, follow directions for taking care of the cut (incision). When should you call for help? Call your doctor now or seek immediate medical care if:  · You have signs of infection, such as:  ¨ Increased pain, swelling, warmth, or redness. ¨ Red streaks leading from the lipoma. ¨ Pus draining from the lipoma. ¨ A fever. Watch closely for changes in your health, and be sure to contact your doctor if:  · The lipoma is growing or changing. · You do not get better as expected. Where can you learn more? Go to http://zulma-susana.info/. Enter H668 in the search box to learn more about \"Lipoma: Care Instructions. \"  Current as of: October 13, 2016  Content Version: 11.3  © 2601-1287 [employer]. Care instructions adapted under license by [employer] (which disclaims liability or warranty for this information). If you have questions about a medical condition or this instruction, always ask your healthcare professional. [place] [address] any warranty or liability for your use of this information.        Lumbar Spinal Stenosis: Care Instructions  Your Care Instructions    Stenosis in the spine is a narrowing of the canal that is around the spinal cord and nerve roots in your back. It can happen as part of aging. Sometimes bone and other tissue grow into this canal and press on the nerves that branch out from the spinal cord. This can cause pain, numbness, and weakness. When it happens in the lower part of your back, it is called lumbar spinal stenosis. It can cause problems in the legs, feet, and rear end (buttocks). You may be able to get relief from the symptoms of spinal stenosis by taking pain medicine. Your doctor may suggest physical therapy and exercises to keep your spine strong and flexible. Some people try steroid shots to reduce swelling. If pain and numbness in your legs are still so bad that you cannot do your normal activities, you may need surgery. Follow-up care is a key part of your treatment and safety. Be sure to make and go to all appointments, and call your doctor if you are having problems. It's also a good idea to know your test results and keep a list of the medicines you take. How can you care for yourself at home? · Take an over-the-counter pain medicine, such as acetaminophen (Tylenol), ibuprofen (Advil, Motrin), or naproxen (Aleve). Be safe with medicines. Read and follow all instructions on the label. · Do not take two or more pain medicines at the same time unless the doctor told you to. Many pain medicines have acetaminophen, which is Tylenol. Too much acetaminophen (Tylenol) can be harmful. · Stay at a healthy weight. Being overweight puts extra strain on your spine. · Change positions often when you sit or stand. This can ease pain. It may also reduce pressure on the spinal cord and its nerves. · Avoid doing things that make your symptoms worse. Walking downhill and standing for a long time may cause pain. · Stretch and strengthen your back muscles as your doctor or physical therapist recommends.  If your doctor says it is okay to do them, these exercises may help. ¨ Lie on your back with your knees bent. Gently pull one bent knee to your chest. Put that foot back on the floor, and then pull the other knee to your chest.  ¨ Do pelvic tilts. Lie on your back with your knees bent. Tighten your stomach muscles. Pull your belly button (navel) in and up toward your ribs. You should feel like your back is pressing to the floor and your hips and pelvis are slightly lifting off the floor. Hold for 6 seconds while breathing smoothly. ¨ Stand with your back flat against a wall. Slowly slide down until your knees are slightly bent. Hold for 10 seconds, then slide back up the wall. · Remove or change anything in your house that may cause you to fall. Keep walkways clear of clutter, electrical cords, and throw rugs. When should you call for help? Call 911 anytime you think you may need emergency care. For example, call if:  · You are unable to move a leg at all. Call your doctor now or seek immediate medical care if:  · You have new or worse symptoms in your legs, belly, or buttocks. Symptoms may include:  ¨ Numbness or tingling. ¨ Weakness. ¨ Pain. · You lose bladder or bowel control. Watch closely for changes in your health, and be sure to contact your doctor if:  · You are not getting better as expected. Where can you learn more? Go to http://zulma-susana.info/. Wilbur Gomez in the search box to learn more about \"Lumbar Spinal Stenosis: Care Instructions. \"  Current as of: March 21, 2017  Content Version: 11.3  © 5876-8927 [employer]. Care instructions adapted under license by [employer] (which disclaims liability or warranty for this information). If you have questions about a medical condition or this instruction, always ask your healthcare professional. [address] any warranty or liability for your use of this information.

## 2017-09-19 ENCOUNTER — HOSPITAL ENCOUNTER (OUTPATIENT)
Age: 57
Setting detail: OUTPATIENT SURGERY
Discharge: HOME | End: 2017-09-19
Attending: PHYSICAL MEDICINE & REHABILITATION | Admitting: PHYSICAL MEDICINE & REHABILITATION
Payer: MEDICARE

## 2017-09-19 ENCOUNTER — APPOINTMENT (OUTPATIENT)
Dept: GENERAL RADIOLOGY | Age: 57
End: 2017-09-19
Attending: PHYSICAL MEDICINE & REHABILITATION
Payer: MEDICARE

## 2017-09-19 VITALS
DIASTOLIC BLOOD PRESSURE: 95 MMHG | HEART RATE: 77 BPM | TEMPERATURE: 98.4 F | WEIGHT: 165 LBS | BODY MASS INDEX: 26.52 KG/M2 | SYSTOLIC BLOOD PRESSURE: 159 MMHG | RESPIRATION RATE: 16 BRPM | HEIGHT: 66 IN | OXYGEN SATURATION: 100 %

## 2017-09-19 PROCEDURE — 74011250637 HC RX REV CODE- 250/637: Performed by: PHYSICAL MEDICINE & REHABILITATION

## 2017-09-19 PROCEDURE — 77030003543 HC NDL EPDRL BBMI -A: Performed by: PHYSICAL MEDICINE & REHABILITATION

## 2017-09-19 PROCEDURE — 74011636320 HC RX REV CODE- 636/320

## 2017-09-19 PROCEDURE — 74011000250 HC RX REV CODE- 250

## 2017-09-19 PROCEDURE — 76010000009 HC PAIN MGT 0 TO 30 MIN PROC: Performed by: PHYSICAL MEDICINE & REHABILITATION

## 2017-09-19 PROCEDURE — 74011250636 HC RX REV CODE- 250/636

## 2017-09-19 PROCEDURE — 77030014124 HC TY EPDRL BBMI -A: Performed by: PHYSICAL MEDICINE & REHABILITATION

## 2017-09-19 RX ORDER — DEXAMETHASONE SODIUM PHOSPHATE 100 MG/10ML
INJECTION INTRAMUSCULAR; INTRAVENOUS AS NEEDED
Status: DISCONTINUED | OUTPATIENT
Start: 2017-09-19 | End: 2017-09-19 | Stop reason: HOSPADM

## 2017-09-19 RX ORDER — LIDOCAINE HYDROCHLORIDE 10 MG/ML
INJECTION, SOLUTION EPIDURAL; INFILTRATION; INTRACAUDAL; PERINEURAL AS NEEDED
Status: DISCONTINUED | OUTPATIENT
Start: 2017-09-19 | End: 2017-09-19 | Stop reason: HOSPADM

## 2017-09-19 RX ORDER — DIAZEPAM 5 MG/1
2.5-1 TABLET ORAL ONCE
Status: COMPLETED | OUTPATIENT
Start: 2017-09-19 | End: 2017-09-19

## 2017-09-19 RX ADMIN — DIAZEPAM 10 MG: 5 TABLET ORAL at 12:40

## 2017-09-19 NOTE — DISCHARGE INSTRUCTIONS
[facility]   ([facility])  Dr. Vicky Travis, Dr. Shantanu Vivar, Dr. Pitts Du not drive a car, operate heavy machinery or dangerous equipment for 24 hours. * Activity as tolerated; rest for the remainder of the day. * Resume pre-block medications including those for your family doctor. * Do not drink alcoholic beverages for 24 hours. Alcohol and the medications you have received may interact and cause an adverse reaction. * You may feel better this evening and worse tomorrow, as the numbing medications wears off and the steroid has yet to begin to work. After 48 hrs the steroid should begin to release bringing you relief. * You may shower this evening and remove any bandages. * Avoid hot tubs and heating pads for 24 hours. You may use cold packs on the procedure site as tolerated for the first 24 hours. * If a headache develops, drink plenty of fluids and rest.  Take over the counter medications for headache if needed. If the headache continues longer than 24 hours, call MD at the [address] [place] [address]. [phone]    * Continue taking pain medications as needed. * You may resume your regular diet if tolerated. Otherwise, start with sips of water and advance slowly. * If Diabetic: check your blood sugar three times a day for the next 3 days. If your sugar is greater than 300 call your family doctor. If your sugar is greater than 400, have someone transport you to the nearest Emergency Room. * If you experience any of the following problems, Please Call the [address] [place] [address] at [phone].         * Shortness of Breath    * Fever of 101 or higher    * Nausea / Vomiting    * Severe Headache    * Weakness or numbness in arms or legs that is not      resolving    * Prolonged increase in pain greater than 4 days      DISCHARGE SUMMARY from Nurse      PATIENT INSTRUCTIONS:    After oral sedation, for 24 hours or while taking prescription Narcotics:  · Limit your activities  · Do not drive and operate hazardous machinery  · Do not make important personal or business decisions  · Do  not drink alcoholic beverages  · If you have not urinated within 8 hours after discharge, please contact your surgeon on call. Report the following to your surgeon:  · Excessive pain, swelling, redness or odor of or around the surgical area  · Temperature over 101  · Nausea and vomiting lasting longer than 4 hours or if unable to take medications  · Any signs of decreased circulation or nerve impairment to extremity: change in color, persistent  numbness, tingling, coldness or increase pain  · Any questions            What to do at Home:  Recommended activity: Activity as tolerated, NO DRIVING FOR 12 Hours post injection          *  Please give a list of your current medications to your Primary Care Provider. *  Please update this list whenever your medications are discontinued, doses are      changed, or new medications (including over-the-counter products) are added. *  Please carry medication information at all times in case of emergency situations. These are general instructions for a healthy lifestyle:    No smoking/ No tobacco products/ Avoid exposure to second hand smoke    Surgeon General's Warning:  Quitting smoking now greatly reduces serious risk to your health. Obesity, smoking, and sedentary lifestyle greatly increases your risk for illness    A healthy diet, regular physical exercise & weight monitoring are important for maintaining a healthy lifestyle    You may be retaining fluid if you have a history of heart failure or if you experience any of the following symptoms:  Weight gain of 3 pounds or more overnight or 5 pounds in a week, increased swelling in our hands or feet or shortness of breath while lying flat in bed.   Please call your doctor as soon as you notice any of these symptoms; do not wait until your next office visit. Recognize signs and symptoms of STROKE:    F-face looks uneven    A-arms unable to move or move unevenly    S-speech slurred or non-existent    T-time-call 911 as soon as signs and symptoms begin-DO NOT go       Back to bed or wait to see if you get better-TIME IS BRAIN.

## 2017-09-19 NOTE — INTERVAL H&P NOTE
H&P Update:  Jolynn Nose. was seen and briefly examined. History and physical has been reviewed.   There have been no significant clinical changes since the completion of the originally dated History and Physical.    Signed By: Marcella Jean MD     September 19, 2017 1:37 PM
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
## 2017-09-19 NOTE — PROCEDURES
Intralaminar Epidural Steroid Procedure Note        Patient Name   Cynthia Snow. Date of Procedure: September 19, 2017  Preoperative Diagnosis: Bilateral stenosis of lateral recess of lumbar spine [M48.06]  Postoperative Diagnosis: Same  Location [facility], [address]      Procedure:  Epidural Steroid Injection    Consent:  Informed consent was obtained prior to the procedure. In addition to the potential risks associated with the procedure itself, the patient was informed both verbally and in writing of the potential side effects of the use of glucocorticoid. The patient appeared to comprehend the informed consent and desired to have the procedure performed. Procedure in Detail:  The patient was taken to the procedure suite and placed in the prone position on the operating table on appropriate padding. The posterior lumbar region was prepped and draped in the usual sterile fashion. Intraoperative fluoroscopy was used to localize the L2-L3 interspace. The skin was infiltrated with 1% lidocaine. An 18-gauge Tuohy needle was advanced into the epidural space at L2-L3 under fluoroscopic guidance using the loss of resistance technique. No cerebrospinal fluid was seen throughout the procedure. Yes  A small amount of Isovue was injected into the epidural space, confirming appropriated needle placement on fluoroscopy. No vascular uptake was identified. Next, 2ml of 1% Lidocaine and 30mg of preservative free Dexamethasone were injected via the Tuohy needle. The needle was removed from the patient. The patient tolerated the procedure well and was discharged home with designated  and care instructions.       Signed By: Phil Rockwell MD                        September 19, 2017

## 2017-11-02 ENCOUNTER — DOCUMENTATION ONLY (OUTPATIENT)
Dept: SURGERY | Age: 57
End: 2017-11-02

## 2017-11-02 NOTE — PROGRESS NOTES
11/2/2017 patient did not show up for his scheduled colonoscopy on 11/1/2017. He was first schedule on 3/22/2017 and had been reschedule 2 times. I spoke to the patient on 10/25/2017 and sent him an e-mail on that date of his prep instructions he requested. I spoke to the patients spouse the afternoon before the procedure and she stated she would have him call. I did not hear from him.

## 2017-12-04 ENCOUNTER — OFFICE VISIT (OUTPATIENT)
Dept: ORTHOPEDIC SURGERY | Age: 57
End: 2017-12-04

## 2017-12-04 VITALS
HEART RATE: 84 BPM | DIASTOLIC BLOOD PRESSURE: 80 MMHG | SYSTOLIC BLOOD PRESSURE: 144 MMHG | TEMPERATURE: 98 F | RESPIRATION RATE: 17 BRPM

## 2017-12-04 DIAGNOSIS — M48.062 LUMBAR STENOSIS WITH NEUROGENIC CLAUDICATION: ICD-10-CM

## 2017-12-04 RX ORDER — CYCLOBENZAPRINE HCL 10 MG
5 TABLET ORAL
Qty: 45 TAB | Refills: 2 | Status: SHIPPED | OUTPATIENT
Start: 2017-12-04 | End: 2018-03-05 | Stop reason: SDUPTHER

## 2017-12-04 RX ORDER — OXYCODONE HYDROCHLORIDE 5 MG/1
TABLET ORAL
Qty: 60 TAB | Refills: 0 | Status: SHIPPED | OUTPATIENT
Start: 2018-02-02 | End: 2018-03-05 | Stop reason: SDUPTHER

## 2017-12-04 RX ORDER — OXYCODONE HYDROCHLORIDE 5 MG/1
TABLET ORAL
Qty: 60 TAB | Refills: 0 | Status: SHIPPED | OUTPATIENT
Start: 2018-01-03 | End: 2017-12-04 | Stop reason: SDUPTHER

## 2017-12-04 RX ORDER — OXYCODONE HYDROCHLORIDE 5 MG/1
TABLET ORAL
Qty: 60 TAB | Refills: 0 | Status: SHIPPED | OUTPATIENT
Start: 2017-12-04 | End: 2018-01-21

## 2017-12-04 RX ORDER — OXYCODONE HYDROCHLORIDE 5 MG/1
TABLET ORAL
Qty: 60 TAB | Refills: 0 | Status: CANCELLED | OUTPATIENT
Start: 2018-02-02
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
## 2017-12-04 NOTE — MR AVS SNAPSHOT
Visit Information Date & Time Provider Department Dept. Phone Encounter #  
 12/4/2017  8:30 AM Cristina Foster MD [facility] [phone] [id number] Follow-up Instructions Return in about 3 months (around 3/4/2018). Upcoming Health Maintenance Date Due DTaP/Tdap/Td series (1 - Tdap) 7/27/1979 FOBT Q 1 YEAR AGE 50-75 7/27/2008 Influenza Age 5 to Adult 8/1/2017 Allergies as of 12/4/2017  Review Complete On: 12/4/2017 By: Juan C Sanchez Severity Noted Reaction Type Reactions Percocet [Oxycodone-acetaminophen]  07/05/2016    Itching Current Immunizations  Reviewed on 5/29/2016 Name Date Influenza Vaccine (Quad) PF 2/3/2016 Not reviewed this visit You Were Diagnosed With   
  
 Codes Comments Lumbar stenosis with neurogenic claudication     ICD-10-CM: M48.062 
ICD-9-CM: 724.03 Vitals BP Pulse Temp Resp Smoking Status 144/80 (BP 1 Location: Left arm, BP Patient Position: Sitting) 84 98 °F (36.7 °C) (Oral) 17 Former Smoker Preferred Pharmacy Pharmacy Name Phone [facility] [address] [phone] Your Updated Medication List  
  
   
This list is accurate as of: 12/4/17  9:13 AM.  Always use your most recent med list.  
  
  
  
  
 cyclobenzaprine 10 mg tablet Commonly known as:  FLEXERIL Take 0.5 Tabs by mouth two (2) times daily as needed for Muscle Spasm(s). DULoxetine 60 mg capsule Commonly known as:  CYMBALTA Take 1 Cap by mouth daily (with dinner). lisinopril-hydroCHLOROthiazide 20-25 mg per tablet Commonly known as:  Conda Franc Take 1 Tab by mouth daily. * oxyCODONE IR 5 mg immediate release tablet Commonly known as:  Benjaman Hurl Take 1 tab by mouth 1-2 times a day as needed  for pain. Indications: chronic pain * oxyCODONE IR 5 mg immediate release tablet Commonly known as:  Yajaira Ortega Take 1 tab po 1-2 times a day as needed for pain  Indications: chronic pain Start taking on:  2/2/2018 * Notice: This list has 2 medication(s) that are the same as other medications prescribed for you. Read the directions carefully, and ask your doctor or other care provider to review them with you. Prescriptions Printed Refills  
 oxyCODONE IR (ROXICODONE) 5 mg immediate release tablet 0 Sig: Take 1 tab by mouth 1-2 times a day as needed  for pain. Indications: chronic pain  
 Class: Print  
 oxyCODONE IR (ROXICODONE) 5 mg immediate release tablet 0 Starting on: 2/2/2018 Sig: Take 1 tab po 1-2 times a day as needed for pain  Indications: chronic pain  
 Class: Print Prescriptions Sent to Pharmacy Refills  
 cyclobenzaprine (FLEXERIL) 10 mg tablet 2 Sig: Take 0.5 Tabs by mouth two (2) times daily as needed for Muscle Spasm(s). Class: Normal  
 Pharmacy: [address] .  #: [phone] Route: Oral  
  
Follow-up Instructions Return in about 3 months (around 3/4/2018). Please provide this summary of care documentation to your next provider. Your primary care clinician is listed as Rahul. If you have any questions after today's visit, please call [phone].

## 2018-01-21 ENCOUNTER — HOSPITAL ENCOUNTER (EMERGENCY)
Age: 58
Discharge: HOME | End: 2018-01-21
Attending: EMERGENCY MEDICINE
Payer: COMMERCIAL

## 2018-01-21 VITALS
BODY MASS INDEX: 25.23 KG/M2 | HEIGHT: 66 IN | SYSTOLIC BLOOD PRESSURE: 151 MMHG | RESPIRATION RATE: 20 BRPM | TEMPERATURE: 98.8 F | HEART RATE: 82 BPM | WEIGHT: 157 LBS | OXYGEN SATURATION: 100 % | DIASTOLIC BLOOD PRESSURE: 102 MMHG

## 2018-01-21 DIAGNOSIS — R10.9 RIGHT FLANK PAIN: Primary | ICD-10-CM

## 2018-01-21 LAB
ALBUMIN SERPL-MCNC: 3.6 G/DL (ref 3.4–5)
ALBUMIN/GLOB SERPL: 0.9 {RATIO} (ref 0.8–1.7)
ALP SERPL-CCNC: 79 U/L (ref 45–117)
ALT SERPL-CCNC: 35 U/L (ref 16–61)
ANION GAP SERPL CALC-SCNC: 8 MMOL/L (ref 3–18)
APPEARANCE UR: CLEAR
AST SERPL-CCNC: 30 U/L (ref 15–37)
BASOPHILS # BLD: 0.1 K/UL (ref 0–0.06)
BASOPHILS NFR BLD: 1 % (ref 0–2)
BILIRUB SERPL-MCNC: 0.8 MG/DL (ref 0.2–1)
BILIRUB UR QL: NEGATIVE
BUN SERPL-MCNC: 5 MG/DL (ref 7–18)
BUN/CREAT SERPL: 6 (ref 12–20)
CALCIUM SERPL-MCNC: 9.4 MG/DL (ref 8.5–10.1)
CHLORIDE SERPL-SCNC: 103 MMOL/L (ref 100–108)
CO2 SERPL-SCNC: 28 MMOL/L (ref 21–32)
COLOR UR: YELLOW
CREAT SERPL-MCNC: 0.9 MG/DL (ref 0.6–1.3)
DIFFERENTIAL METHOD BLD: ABNORMAL
EOSINOPHIL # BLD: 0.4 K/UL (ref 0–0.4)
EOSINOPHIL NFR BLD: 4 % (ref 0–5)
ERYTHROCYTE [DISTWIDTH] IN BLOOD BY AUTOMATED COUNT: 13.3 % (ref 11.6–14.5)
GLOBULIN SER CALC-MCNC: 4.1 G/DL (ref 2–4)
GLUCOSE SERPL-MCNC: 140 MG/DL (ref 74–99)
GLUCOSE UR STRIP.AUTO-MCNC: NEGATIVE MG/DL
HCT VFR BLD AUTO: 41.4 % (ref 36–48)
HGB BLD-MCNC: 13.7 G/DL (ref 13–16)
HGB UR QL STRIP: NEGATIVE
KETONES UR QL STRIP.AUTO: NEGATIVE MG/DL
LEUKOCYTE ESTERASE UR QL STRIP.AUTO: NEGATIVE
LYMPHOCYTES # BLD: 2.6 K/UL (ref 0.9–3.6)
LYMPHOCYTES NFR BLD: 31 % (ref 21–52)
MCH RBC QN AUTO: 30.4 PG (ref 24–34)
MCHC RBC AUTO-ENTMCNC: 33.1 G/DL (ref 31–37)
MCV RBC AUTO: 92 FL (ref 74–97)
MONOCYTES # BLD: 0.7 K/UL (ref 0.05–1.2)
MONOCYTES NFR BLD: 9 % (ref 3–10)
NEUTS SEG # BLD: 4.7 K/UL (ref 1.8–8)
NEUTS SEG NFR BLD: 55 % (ref 40–73)
NITRITE UR QL STRIP.AUTO: NEGATIVE
PH UR STRIP: 8 [PH] (ref 5–8)
PLATELET # BLD AUTO: 216 K/UL (ref 135–420)
PMV BLD AUTO: 9.6 FL (ref 9.2–11.8)
POTASSIUM SERPL-SCNC: 3.5 MMOL/L (ref 3.5–5.5)
PROT SERPL-MCNC: 7.7 G/DL (ref 6.4–8.2)
PROT UR STRIP-MCNC: NEGATIVE MG/DL
RBC # BLD AUTO: 4.5 M/UL (ref 4.7–5.5)
SODIUM SERPL-SCNC: 139 MMOL/L (ref 136–145)
SP GR UR REFRACTOMETRY: 1.01 (ref 1–1.03)
UROBILINOGEN UR QL STRIP.AUTO: 1 EU/DL (ref 0.2–1)
WBC # BLD AUTO: 8.4 K/UL (ref 4.6–13.2)

## 2018-01-21 PROCEDURE — 99282 EMERGENCY DEPT VISIT SF MDM: CPT

## 2018-01-21 PROCEDURE — 80053 COMPREHEN METABOLIC PANEL: CPT | Performed by: PHYSICIAN ASSISTANT

## 2018-01-21 PROCEDURE — 85025 COMPLETE CBC W/AUTO DIFF WBC: CPT | Performed by: PHYSICIAN ASSISTANT

## 2018-01-21 PROCEDURE — 81003 URINALYSIS AUTO W/O SCOPE: CPT | Performed by: PHYSICIAN ASSISTANT
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
## 2018-01-23 ENCOUNTER — HOSPITAL ENCOUNTER (OUTPATIENT)
Age: 58
Setting detail: OUTPATIENT SURGERY
Discharge: HOME | End: 2018-01-23
Attending: PHYSICAL MEDICINE & REHABILITATION | Admitting: PHYSICAL MEDICINE & REHABILITATION
Payer: MEDICARE

## 2018-01-23 ENCOUNTER — APPOINTMENT (OUTPATIENT)
Dept: GENERAL RADIOLOGY | Age: 58
End: 2018-01-23
Attending: PHYSICAL MEDICINE & REHABILITATION
Payer: MEDICARE

## 2018-01-23 VITALS
DIASTOLIC BLOOD PRESSURE: 98 MMHG | HEART RATE: 94 BPM | HEIGHT: 66 IN | BODY MASS INDEX: 25.23 KG/M2 | TEMPERATURE: 98.6 F | WEIGHT: 157 LBS | SYSTOLIC BLOOD PRESSURE: 148 MMHG | OXYGEN SATURATION: 98 % | RESPIRATION RATE: 16 BRPM

## 2018-01-23 PROCEDURE — 74011636320 HC RX REV CODE- 636/320: Performed by: PHYSICAL MEDICINE & REHABILITATION

## 2018-01-23 PROCEDURE — 74011636320 HC RX REV CODE- 636/320

## 2018-01-23 PROCEDURE — 76010000009 HC PAIN MGT 0 TO 30 MIN PROC: Performed by: PHYSICAL MEDICINE & REHABILITATION

## 2018-01-23 PROCEDURE — 74011250636 HC RX REV CODE- 250/636: Performed by: PHYSICAL MEDICINE & REHABILITATION

## 2018-01-23 PROCEDURE — 77030003543 HC NDL EPDRL BBMI -A: Performed by: PHYSICAL MEDICINE & REHABILITATION

## 2018-01-23 PROCEDURE — 74011250636 HC RX REV CODE- 250/636

## 2018-01-23 PROCEDURE — 74011250637 HC RX REV CODE- 250/637: Performed by: PHYSICAL MEDICINE & REHABILITATION

## 2018-01-23 PROCEDURE — 77030014124 HC TY EPDRL BBMI -A: Performed by: PHYSICAL MEDICINE & REHABILITATION

## 2018-01-23 RX ORDER — DIAZEPAM 5 MG/1
2.5-1 TABLET ORAL ONCE
Status: COMPLETED | OUTPATIENT
Start: 2018-01-23 | End: 2018-01-23

## 2018-01-23 RX ORDER — LIDOCAINE HYDROCHLORIDE 10 MG/ML
INJECTION, SOLUTION EPIDURAL; INFILTRATION; INTRACAUDAL; PERINEURAL AS NEEDED
Status: DISCONTINUED | OUTPATIENT
Start: 2018-01-23 | End: 2018-01-23 | Stop reason: HOSPADM

## 2018-01-23 RX ORDER — DEXAMETHASONE SODIUM PHOSPHATE 100 MG/10ML
INJECTION INTRAMUSCULAR; INTRAVENOUS AS NEEDED
Status: DISCONTINUED | OUTPATIENT
Start: 2018-01-23 | End: 2018-01-23 | Stop reason: HOSPADM

## 2018-01-23 RX ADMIN — DIAZEPAM 10 MG: 5 TABLET ORAL at 14:12

## 2018-01-23 NOTE — INTERVAL H&P NOTE
H&P Update:  Cynthia Snow. was seen and briefly examined. History and physical has been reviewed.   There have been no significant clinical changes since the completion of the originally dated History and Physical.    Signed By: Phil Rockwell MD     January 23, 2018 2:51 PM

## 2018-01-23 NOTE — PROCEDURES
Intralaminar Epidural Steroid Procedure Note        Patient Name   Jolynn Ellsworth. Date of Procedure: January 23, 2018  Preoperative Diagnosis: Bilateral stenosis of lateral recess of lumbar spine [M48.061]  Postoperative Diagnosis: Same  Location [facility], [address]      Procedure:  Epidural Steroid Injection    Consent:  Informed consent was obtained prior to the procedure. In addition to the potential risks associated with the procedure itself, the patient was informed both verbally and in writing of the potential side effects of the use of glucocorticoid. The patient appeared to comprehend the informed consent and desired to have the procedure performed. Procedure in Detail:  The patient was taken to the procedure suite and placed in the prone position on the operating table on appropriate padding. The posterior lumbar region was prepped and draped in the usual sterile fashion. Intraoperative fluoroscopy was used to localize the L2-L3 interspace. The skin was infiltrated with 1% lidocaine. An 18-gauge Tuohy needle was advanced into the epidural space at L2-L3 under fluoroscopic guidance using the loss of resistance technique. No cerebrospinal fluid was seen throughout the procedure. Yes  A small amount of Isovue was injected into the epidural space, confirming appropriated needle placement on fluoroscopy. No vascular uptake was identified. Next, 2ml of 1% Lidocaine and 30mg of preservative free Dexamethasone were injected via the Tuohy needle. The needle was removed from the patient. The patient tolerated the procedure well and was discharged home with designated  and care instructions.       Signed By: Marcella Jean MD                        January 23, 2018
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
## 2018-01-23 NOTE — DISCHARGE INSTRUCTIONS
[facility]   ([place])  Dr. Mireille Castillo, Dr. Charlie Waters, Dr. Tatiana Martin not drive a car, operate heavy machinery or dangerous equipment for 24 hours. * Activity as tolerated; rest for the remainder of the day. * Resume pre-block medications including those for your family doctor. * Do not drink alcoholic beverages for 24 hours. Alcohol and the medications you have received may interact and cause an adverse reaction. * You may feel better this evening and worse tomorrow, as the numbing medications wears off and the steroid has yet to begin to work. After 48 hrs the steroid should begin to release bringing you relief. * You may shower this evening and remove any bandages. * Avoid hot tubs and heating pads for 24 hours. You may use cold packs on the procedure site as tolerated for the first 24 hours. * If a headache develops, drink plenty of fluids and rest.  Take over the counter medications for headache if needed. If the headache continues longer than 24 hours, call MD at the [address] [place] [address]. [phone]    * Continue taking pain medications as needed. * You may resume your regular diet if tolerated. Otherwise, start with sips of water and advance slowly. * If Diabetic: check your blood sugar three times a day for the next 3 days. If your sugar is greater than 300 call your family doctor. If your sugar is greater than 400, have someone transport you to the nearest Emergency Room. * If you experience any of the following problems, Please Call the [address] [place] [address] at [phone].         * Shortness of Breath    * Fever of 101 or higher    * Nausea / Vomiting    * Severe Headache    * Weakness or numbness in arms or legs that is not      resolving    * Prolonged increase in pain greater than 4 days      DISCHARGE SUMMARY from Nurse      PATIENT INSTRUCTIONS:    After oral sedation, for 24 hours or while taking prescription Narcotics:  · Limit your activities  · Do not drive and operate hazardous machinery  · Do not make important personal or business decisions  · Do  not drink alcoholic beverages  · If you have not urinated within 8 hours after discharge, please contact your surgeon on call. Report the following to your surgeon:  · Excessive pain, swelling, redness or odor of or around the surgical area  · Temperature over 101  · Nausea and vomiting lasting longer than 4 hours or if unable to take medications  · Any signs of decreased circulation or nerve impairment to extremity: change in color, persistent  numbness, tingling, coldness or increase pain  · Any questions            What to do at Home:  Recommended activity: Activity as tolerated, NO DRIVING FOR 12 Hours post injection          *  Please give a list of your current medications to your Primary Care Provider. *  Please update this list whenever your medications are discontinued, doses are      changed, or new medications (including over-the-counter products) are added. *  Please carry medication information at all times in case of emergency situations. These are general instructions for a healthy lifestyle:    No smoking/ No tobacco products/ Avoid exposure to second hand smoke    Surgeon General's Warning:  Quitting smoking now greatly reduces serious risk to your health. Obesity, smoking, and sedentary lifestyle greatly increases your risk for illness    A healthy diet, regular physical exercise & weight monitoring are important for maintaining a healthy lifestyle    You may be retaining fluid if you have a history of heart failure or if you experience any of the following symptoms:  Weight gain of 3 pounds or more overnight or 5 pounds in a week, increased swelling in our hands or feet or shortness of breath while lying flat in bed.   Please call your doctor as soon as you notice any of these symptoms; do not wait until your next office visit. Recognize signs and symptoms of STROKE:    F-face looks uneven    A-arms unable to move or move unevenly    S-speech slurred or non-existent    T-time-call 911 as soon as signs and symptoms begin-DO NOT go       Back to bed or wait to see if you get better-TIME IS BRAIN.

## 2018-03-05 ENCOUNTER — OFFICE VISIT (OUTPATIENT)
Dept: ORTHOPEDIC SURGERY | Age: 58
End: 2018-03-05

## 2018-03-05 VITALS
SYSTOLIC BLOOD PRESSURE: 159 MMHG | DIASTOLIC BLOOD PRESSURE: 106 MMHG | BODY MASS INDEX: 27.32 KG/M2 | RESPIRATION RATE: 18 BRPM | OXYGEN SATURATION: 96 % | TEMPERATURE: 98.6 F | WEIGHT: 170 LBS | HEART RATE: 86 BPM | HEIGHT: 66 IN

## 2018-03-05 DIAGNOSIS — M48.062 LUMBAR STENOSIS WITH NEUROGENIC CLAUDICATION: Primary | ICD-10-CM

## 2018-03-05 DIAGNOSIS — M48.062 LUMBAR STENOSIS WITH NEUROGENIC CLAUDICATION: ICD-10-CM

## 2018-03-05 RX ORDER — CYCLOBENZAPRINE HCL 10 MG
5 TABLET ORAL
Qty: 45 TAB | Refills: 2 | Status: SHIPPED | OUTPATIENT
Start: 2018-03-05 | End: 2018-05-05

## 2018-03-05 RX ORDER — OXYCODONE HYDROCHLORIDE 5 MG/1
TABLET ORAL
Qty: 60 TAB | Refills: 0 | Status: SHIPPED | OUTPATIENT
Start: 2018-03-05 | End: 2018-06-11 | Stop reason: SDUPTHER

## 2018-03-05 NOTE — PATIENT INSTRUCTIONS
Lumbar Spinal Stenosis: Care Instructions  Your Care Instructions    Stenosis in the spine is a narrowing of the canal that is around the spinal cord and nerve roots in your back. It can happen as part of aging. Sometimes bone and other tissue grow into this canal and press on the nerves that branch out from the spinal cord. This can cause pain, numbness, and weakness. When it happens in the lower part of your back, it is called lumbar spinal stenosis. It can cause problems in the legs, feet, and rear end (buttocks). You may be able to get relief from the symptoms of spinal stenosis by taking pain medicine. Your doctor may suggest physical therapy and exercises to keep your spine strong and flexible. Some people try steroid shots to reduce swelling. If pain and numbness in your legs are still so bad that you cannot do your normal activities, you may need surgery. Follow-up care is a key part of your treatment and safety. Be sure to make and go to all appointments, and call your doctor if you are having problems. It's also a good idea to know your test results and keep a list of the medicines you take. How can you care for yourself at home? · Take an over-the-counter pain medicine, such as acetaminophen (Tylenol), ibuprofen (Advil, Motrin), or naproxen (Aleve). Be safe with medicines. Read and follow all instructions on the label. · Do not take two or more pain medicines at the same time unless the doctor told you to. Many pain medicines have acetaminophen, which is Tylenol. Too much acetaminophen (Tylenol) can be harmful. · Stay at a healthy weight. Being overweight puts extra strain on your spine. · Change positions often when you sit or stand. This can ease pain. It may also reduce pressure on the spinal cord and its nerves. · Avoid doing things that make your symptoms worse. Walking downhill and standing for a long time may cause pain.   · Stretch and strengthen your back muscles as your doctor or physical therapist recommends. If your doctor says it is okay to do them, these exercises may help. ¨ Lie on your back with your knees bent. Gently pull one bent knee to your chest. Put that foot back on the floor, and then pull the other knee to your chest.  ¨ Do pelvic tilts. Lie on your back with your knees bent. Tighten your stomach muscles. Pull your belly button (navel) in and up toward your ribs. You should feel like your back is pressing to the floor and your hips and pelvis are slightly lifting off the floor. Hold for 6 seconds while breathing smoothly. ¨ Stand with your back flat against a wall. Slowly slide down until your knees are slightly bent. Hold for 10 seconds, then slide back up the wall. · Remove or change anything in your house that may cause you to fall. Keep walkways clear of clutter, electrical cords, and throw rugs. When should you call for help? Call 911 anytime you think you may need emergency care. For example, call if:  ? · You are unable to move a leg at all. ?Call your doctor now or seek immediate medical care if:  ? · You have new or worse symptoms in your legs, belly, or buttocks. Symptoms may include:  ¨ Numbness or tingling. ¨ Weakness. ¨ Pain. ? · You lose bladder or bowel control. ? Watch closely for changes in your health, and be sure to contact your doctor if you are not getting better as expected. Where can you learn more? Go to http://zulma-susana.info/. Cammie Campbell in the search box to learn more about \"Lumbar Spinal Stenosis: Care Instructions. \"  Current as of: March 21, 2017  Content Version: 11.4  © 8795-2163 [employer]. Care instructions adapted under license by [employer] (which disclaims liability or warranty for this information).  If you have questions about a medical condition or this instruction, always ask your healthcare professional. [address] any warranty or liability for your use of this information.
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
## 2018-03-05 NOTE — MR AVS SNAPSHOT
[address] [facility] [address] [facility] [address] 
[phone] Patient: Mariana Loaiza. MRN: [id number] XAL:[birth year] Visit Information Date & Time Provider Department Dept. Phone Encounter #  
 3/5/2018  8:40 AM Mando Yoder MD [facility] [phone] [id number] Follow-up Instructions Return in about 3 months (around 6/5/2018) for with NP. Upcoming Health Maintenance Date Due DTaP/Tdap/Td series (1 - Tdap) 7/27/1979 FOBT Q 1 YEAR AGE 50-75 7/27/2008 Influenza Age 5 to Adult 8/1/2017 Allergies as of 3/5/2018  Review Complete On: 3/5/2018 By: Malika Carter Severity Noted Reaction Type Reactions Cymbalta [Duloxetine]  03/05/2018    Itching Percocet [Oxycodone-acetaminophen]  07/05/2016    Itching Current Immunizations  Reviewed on 5/29/2016 Name Date Influenza Vaccine (Quad) PF 2/3/2016 Not reviewed this visit You Were Diagnosed With   
  
 Codes Comments Lumbar stenosis with neurogenic claudication    -  Primary ICD-10-CM: C69.165 
ICD-9-CM: 724.03 Vitals BP Pulse Temp Resp Height(growth percentile) Weight(growth percentile) (!) 159/106 86 98.6 °F (37 °C) (Oral) 18 5' 6\" (1.676 m) 170 lb (77.1 kg) SpO2 BMI Smoking Status 96% 27.44 kg/m2 Former Smoker BMI and BSA Data Body Mass Index Body Surface Area  
 27.44 kg/m 2 1.89 m 2 Preferred Pharmacy Pharmacy Name Phone [address] [phone] Your Updated Medication List  
  
   
This list is accurate as of 3/5/18  9:24 AM.  Always use your most recent med list.  
  
  
  
  
 cyclobenzaprine 10 mg tablet Commonly known as:  FLEXERIL Take 0.5 Tabs by mouth two (2) times daily as needed for Muscle Spasm(s). lisinopril-hydroCHLOROthiazide 20-25 mg per tablet Commonly known as:  Teresa Mccarthy Take 1 Tab by mouth daily. oxyCODONE IR 5 mg immediate release tablet Commonly known as:  Cynthia Mcintyre Take 1 tab po 1-2 times a day as needed for pain  Indications: chronic pain Prescriptions Printed Refills  
 oxyCODONE IR (ROXICODONE) 5 mg immediate release tablet 0 Sig: Take 1 tab po 1-2 times a day as needed for pain  Indications: chronic pain  
 Class: Print Prescriptions Sent to Pharmacy Refills  
 cyclobenzaprine (FLEXERIL) 10 mg tablet 2 Sig: Take 0.5 Tabs by mouth two (2) times daily as needed for Muscle Spasm(s). Class: Normal  
 Pharmacy: [facility] [id number]  #: [phone] Route: Oral  
  
Follow-up Instructions Return in about 3 months (around 6/5/2018) for with NP. To-Do List   
 03/05/2018 Lab:  DRUG SCREEN UR - W/ CONFIRM Patient Instructions Lumbar Spinal Stenosis: Care Instructions Your Care Instructions Stenosis in the spine is a narrowing of the canal that is around the spinal cord and nerve roots in your back. It can happen as part of aging. Sometimes bone and other tissue grow into this canal and press on the nerves that branch out from the spinal cord. This can cause pain, numbness, and weakness. When it happens in the lower part of your back, it is called lumbar spinal stenosis. It can cause problems in the legs, feet, and rear end (buttocks). You may be able to get relief from the symptoms of spinal stenosis by taking pain medicine. Your doctor may suggest physical therapy and exercises to keep your spine strong and flexible. Some people try steroid shots to reduce swelling. If pain and numbness in your legs are still so bad that you cannot do your normal activities, you may need surgery. Follow-up care is a key part of your treatment and safety.  Be sure to make and go to all appointments, and call your doctor if you are having problems. It's also a good idea to know your test results and keep a list of the medicines you take. How can you care for yourself at home? · Take an over-the-counter pain medicine, such as acetaminophen (Tylenol), ibuprofen (Advil, Motrin), or naproxen (Aleve). Be safe with medicines. Read and follow all instructions on the label. · Do not take two or more pain medicines at the same time unless the doctor told you to. Many pain medicines have acetaminophen, which is Tylenol. Too much acetaminophen (Tylenol) can be harmful. · Stay at a healthy weight. Being overweight puts extra strain on your spine. · Change positions often when you sit or stand. This can ease pain. It may also reduce pressure on the spinal cord and its nerves. · Avoid doing things that make your symptoms worse. Walking downhill and standing for a long time may cause pain. · Stretch and strengthen your back muscles as your doctor or physical therapist recommends. If your doctor says it is okay to do them, these exercises may help. ¨ Lie on your back with your knees bent. Gently pull one bent knee to your chest. Put that foot back on the floor, and then pull the other knee to your chest. 
¨ Do pelvic tilts. Lie on your back with your knees bent. Tighten your stomach muscles. Pull your belly button (navel) in and up toward your ribs. You should feel like your back is pressing to the floor and your hips and pelvis are slightly lifting off the floor. Hold for 6 seconds while breathing smoothly. ¨ Stand with your back flat against a wall. Slowly slide down until your knees are slightly bent. Hold for 10 seconds, then slide back up the wall. · Remove or change anything in your house that may cause you to fall. Keep walkways clear of clutter, electrical cords, and throw rugs. When should you call for help? Call 911 anytime you think you may need emergency care. For example, call if: 
? · You are unable to move a leg at all. ?Call your doctor now or seek immediate medical care if: 
? · You have new or worse symptoms in your legs, belly, or buttocks. Symptoms may include: ¨ Numbness or tingling. ¨ Weakness. ¨ Pain. ? · You lose bladder or bowel control. ? Watch closely for changes in your health, and be sure to contact your doctor if you are not getting better as expected. Where can you learn more? Go to http://zulma-susana.info/. Elian Xie in the search box to learn more about \"Lumbar Spinal Stenosis: Care Instructions. \" Current as of: March 21, 2017 Content Version: 11.4 © 2976-8555 [employer]. Care instructions adapted under license by [employer] (which disclaims liability or warranty for this information). If you have questions about a medical condition or this instruction, always ask your healthcare professional. [address] any warranty or liability for your use of this information. Please provide this summary of care documentation to your next provider. Your primary care clinician is listed as Marlyn Davison. If you have any questions after today's visit, please call [phone].

## 2018-03-12 ENCOUNTER — TELEPHONE (OUTPATIENT)
Dept: ORTHOPEDIC SURGERY | Age: 58
End: 2018-03-12

## 2018-03-13 ENCOUNTER — TELEPHONE (OUTPATIENT)
Dept: ORTHOPEDIC SURGERY | Age: 58
End: 2018-03-13

## 2018-03-13 NOTE — TELEPHONE ENCOUNTER
-called patient and voicemail did not identify patient. A message was left for patient to call [phone] at [address] [place] [address] in regards to the patient's message.    -the UDS results are not uploaded on the [employer]. The patient was seen 03/05/2018. It has been 6 business days from pickup of the sample. The lab needs at least 10 business days minimum, before an inquiry can be made for the results.

## 2018-03-13 NOTE — TELEPHONE ENCOUNTER
-called patient and verified . The patient was informed that the results have not been posted yet for his UDS. The results were checked in the AM and while on the phone with the patient. The patient verbalized understanding. The patient was advised to call Friday the  if he would like to check for a status again.

## 2018-03-16 DIAGNOSIS — M54.5 CHRONIC LOW BACK PAIN, UNSPECIFIED BACK PAIN LATERALITY, WITH SCIATICA PRESENCE UNSPECIFIED: Primary | ICD-10-CM

## 2018-03-16 DIAGNOSIS — G89.29 CHRONIC LOW BACK PAIN, UNSPECIFIED BACK PAIN LATERALITY, WITH SCIATICA PRESENCE UNSPECIFIED: Primary | ICD-10-CM

## 2018-03-16 RX ORDER — OXYCODONE HYDROCHLORIDE 5 MG/1
TABLET ORAL
Qty: 60 TAB | Refills: 0 | Status: SHIPPED | OUTPATIENT
Start: 2018-05-03 | End: 2018-05-05

## 2018-03-16 RX ORDER — OXYCODONE HYDROCHLORIDE 5 MG/1
TABLET ORAL
Qty: 60 TAB | Refills: 0 | Status: SHIPPED | OUTPATIENT
Start: 2018-04-05 | End: 2018-05-05

## 2018-03-16 NOTE — TELEPHONE ENCOUNTER
uds consistent    Ok to pend 2 new prescriptions with DNF dates of 4/5 and 5/3 (Roxicodone 5mg BID, #60) if he is ready to pick them up

## 2018-03-19 NOTE — TELEPHONE ENCOUNTER
Uds/ consistent, last fill 3/6    Please pend oxycodone, #60, 0 RF Take 1 tab PO BID  Indications: chronic pain with DNF dates of 4/5 and 5/4 and have an NP at [facility] as I am at [facility] today.

## 2018-03-19 NOTE — TELEPHONE ENCOUNTER
They were pended on Friday and signed by you. I updated a late entry note in the message you sent this morning. Patient is also aware at this time. No further action needed.

## 2018-05-04 ENCOUNTER — HOSPITAL ENCOUNTER (EMERGENCY)
Age: 58
Discharge: HOME | End: 2018-05-04
Attending: EMERGENCY MEDICINE
Payer: MEDICARE

## 2018-05-04 ENCOUNTER — APPOINTMENT (OUTPATIENT)
Dept: GENERAL RADIOLOGY | Age: 58
End: 2018-05-04
Attending: EMERGENCY MEDICINE
Payer: MEDICARE

## 2018-05-04 VITALS
RESPIRATION RATE: 15 BRPM | TEMPERATURE: 98.6 F | BODY MASS INDEX: 27.32 KG/M2 | HEART RATE: 65 BPM | HEIGHT: 66 IN | SYSTOLIC BLOOD PRESSURE: 128 MMHG | WEIGHT: 170 LBS | OXYGEN SATURATION: 96 % | DIASTOLIC BLOOD PRESSURE: 81 MMHG

## 2018-05-04 DIAGNOSIS — R07.9 ACUTE CHEST PAIN: Primary | ICD-10-CM

## 2018-05-04 DIAGNOSIS — R79.89 ABNORMAL LFTS: ICD-10-CM

## 2018-05-04 LAB
ALBUMIN SERPL-MCNC: 3.8 G/DL (ref 3.4–5)
ALBUMIN/GLOB SERPL: 0.9 {RATIO} (ref 0.8–1.7)
ALP SERPL-CCNC: 81 U/L (ref 45–117)
ALT SERPL-CCNC: 81 U/L (ref 16–61)
ANION GAP SERPL CALC-SCNC: 8 MMOL/L (ref 3–18)
AST SERPL-CCNC: 86 U/L (ref 15–37)
BASOPHILS # BLD: 0.1 K/UL (ref 0–0.1)
BASOPHILS NFR BLD: 1 % (ref 0–2)
BILIRUB DIRECT SERPL-MCNC: 0.2 MG/DL (ref 0–0.2)
BILIRUB SERPL-MCNC: 0.6 MG/DL (ref 0.2–1)
BUN SERPL-MCNC: 11 MG/DL (ref 7–18)
BUN/CREAT SERPL: 12 (ref 12–20)
CALCIUM SERPL-MCNC: 8.7 MG/DL (ref 8.5–10.1)
CHLORIDE SERPL-SCNC: 104 MMOL/L (ref 100–108)
CO2 SERPL-SCNC: 25 MMOL/L (ref 21–32)
CREAT SERPL-MCNC: 0.91 MG/DL (ref 0.6–1.3)
DIFFERENTIAL METHOD BLD: ABNORMAL
EOSINOPHIL # BLD: 0.3 K/UL (ref 0–0.4)
EOSINOPHIL NFR BLD: 4 % (ref 0–5)
ERYTHROCYTE [DISTWIDTH] IN BLOOD BY AUTOMATED COUNT: 13.2 % (ref 11.6–14.5)
GLOBULIN SER CALC-MCNC: 4.1 G/DL (ref 2–4)
GLUCOSE SERPL-MCNC: 122 MG/DL (ref 74–99)
HCT VFR BLD AUTO: 39.3 % (ref 36–48)
HGB BLD-MCNC: 13.9 G/DL (ref 13–16)
LIPASE SERPL-CCNC: 172 U/L (ref 73–393)
LYMPHOCYTES # BLD: 3.3 K/UL (ref 0.9–3.6)
LYMPHOCYTES NFR BLD: 36 % (ref 21–52)
MCH RBC QN AUTO: 31.2 PG (ref 24–34)
MCHC RBC AUTO-ENTMCNC: 35.4 G/DL (ref 31–37)
MCV RBC AUTO: 88.3 FL (ref 74–97)
MONOCYTES # BLD: 0.7 K/UL (ref 0.05–1.2)
MONOCYTES NFR BLD: 8 % (ref 3–10)
NEUTS SEG # BLD: 4.7 K/UL (ref 1.8–8)
NEUTS SEG NFR BLD: 51 % (ref 40–73)
PLATELET # BLD AUTO: 183 K/UL (ref 135–420)
PMV BLD AUTO: 10.1 FL (ref 9.2–11.8)
POTASSIUM SERPL-SCNC: 3.5 MMOL/L (ref 3.5–5.5)
PROT SERPL-MCNC: 7.9 G/DL (ref 6.4–8.2)
RBC # BLD AUTO: 4.45 M/UL (ref 4.7–5.5)
SODIUM SERPL-SCNC: 137 MMOL/L (ref 136–145)
TROPONIN I SERPL-MCNC: <0.02 NG/ML (ref 0–0.04)
TROPONIN I SERPL-MCNC: <0.02 NG/ML (ref 0–0.04)
WBC # BLD AUTO: 9.1 K/UL (ref 4.6–13.2)

## 2018-05-04 PROCEDURE — 99285 EMERGENCY DEPT VISIT HI MDM: CPT

## 2018-05-04 PROCEDURE — 74011250636 HC RX REV CODE- 250/636: Performed by: EMERGENCY MEDICINE

## 2018-05-04 PROCEDURE — 83690 ASSAY OF LIPASE: CPT | Performed by: EMERGENCY MEDICINE

## 2018-05-04 PROCEDURE — 80048 BASIC METABOLIC PNL TOTAL CA: CPT | Performed by: EMERGENCY MEDICINE

## 2018-05-04 PROCEDURE — 93005 ELECTROCARDIOGRAM TRACING: CPT

## 2018-05-04 PROCEDURE — 74011000250 HC RX REV CODE- 250: Performed by: EMERGENCY MEDICINE

## 2018-05-04 PROCEDURE — 80076 HEPATIC FUNCTION PANEL: CPT | Performed by: EMERGENCY MEDICINE

## 2018-05-04 PROCEDURE — 84484 ASSAY OF TROPONIN QUANT: CPT | Performed by: EMERGENCY MEDICINE

## 2018-05-04 PROCEDURE — 96374 THER/PROPH/DIAG INJ IV PUSH: CPT

## 2018-05-04 PROCEDURE — 74011250637 HC RX REV CODE- 250/637: Performed by: EMERGENCY MEDICINE

## 2018-05-04 PROCEDURE — 85025 COMPLETE CBC W/AUTO DIFF WBC: CPT | Performed by: EMERGENCY MEDICINE

## 2018-05-04 PROCEDURE — 96375 TX/PRO/DX INJ NEW DRUG ADDON: CPT

## 2018-05-04 PROCEDURE — 71045 X-RAY EXAM CHEST 1 VIEW: CPT

## 2018-05-04 RX ORDER — ONDANSETRON 2 MG/ML
4 INJECTION INTRAMUSCULAR; INTRAVENOUS
Status: COMPLETED | OUTPATIENT
Start: 2018-05-04 | End: 2018-05-04

## 2018-05-04 RX ORDER — GUAIFENESIN 100 MG/5ML
324 LIQUID (ML) ORAL
Status: COMPLETED | OUTPATIENT
Start: 2018-05-04 | End: 2018-05-04

## 2018-05-04 RX ORDER — FAMOTIDINE 10 MG/ML
20 INJECTION INTRAVENOUS
Status: COMPLETED | OUTPATIENT
Start: 2018-05-04 | End: 2018-05-04

## 2018-05-04 RX ADMIN — ONDANSETRON 4 MG: 2 INJECTION INTRAMUSCULAR; INTRAVENOUS at 01:24

## 2018-05-04 RX ADMIN — FAMOTIDINE 20 MG: 10 INJECTION INTRAVENOUS at 01:26

## 2018-05-04 RX ADMIN — ASPIRIN 81 MG 324 MG: 81 TABLET ORAL at 01:24

## 2018-05-04 NOTE — ED PROVIDER NOTES
EMERGENCY DEPARTMENT HISTORY AND PHYSICAL EXAM    12:52 AM      Date: 5/4/2018  Patient Name: Adarsh Rand. History of Presenting Illness     Chief Complaint   Patient presents with    Headache    Palpitations         History Provided By: Patient    Chief Complaint: Hematemesis   Duration:  PTA  Timing:  Acute  Location: Abdominal   Quality: N/A  Severity: Moderate  Modifying Factors: None  Associated Symptoms: headache and chest heaviness      Additional History (Context): Adarsh Bustos is a 59 y.o. male with HTN, low back pain, radiculopathy, migraines, lumbar stenosis and hepatitis C who presents to the ED with a chief complaint of acute hematemesis prior to arrival with associated symptoms of headache and chest heaviness. Patient states he never had hematemesis in the past and he noticed bright red blood during the current episode today. Patient also states \"felt pressure in head was high. \" Patient reports talking half a pill of Norvasc. Patient also reports he has left sided chest heaviness. Patient does not state any alleviating or modifying factors. Patient denies any other symptoms or complaints. PCP: Danilo Giles MD    Current Outpatient Prescriptions   Medication Sig Dispense Refill    oxyCODONE IR (ROXICODONE) 5 mg immediate release tablet Take 1 tab PO BID  Indications: chronic pain 60 Tab 0    oxyCODONE IR (ROXICODONE) 5 mg immediate release tablet Take 1 tab PO BID  Indications: chronic pain 60 Tab 0    oxyCODONE IR (ROXICODONE) 5 mg immediate release tablet Take 1 tab po 1-2 times a day as needed for pain  Indications: chronic pain 60 Tab 0    cyclobenzaprine (FLEXERIL) 10 mg tablet Take 0.5 Tabs by mouth two (2) times daily as needed for Muscle Spasm(s). 45 Tab 2    lisinopril-hydrochlorothiazide (PRINZIDE, ZESTORETIC) 20-25 mg per tablet Take 1 Tab by mouth daily.  80 Tab 3       Past History     Past Medical History:  Past Medical History:   Diagnosis Date    Hypertension     Liver disease     Hepatitis C    Low back pain     Lumbar stenosis     L3-L4, L4-L5    Migraine headache     Numbness and tingling in left hand     Radiculopathy     Right L4-L5    Right leg pain     Sensation of cold in leg     Radiating into right posterior buttock and anterior thigh    Unsteady gait        Past Surgical History:  Past Surgical History:   Procedure Laterality Date    HX CERVICAL FUSION  05/23/2016    ACDF C3/4 C4/5 C5/6 C6/7, Dr. Guadarrama Sang  05/25/16    hematoma removal from cervical spine       Family History:  Family History   Problem Relation Age of Onset    Hypertension Mother     Heart Disease Mother     Diabetes Mother     Hypertension Father        Social History:  Social History   Substance Use Topics    Smoking status: Former Smoker     Packs/day: 0.50     Years: 40.00     Types: Cigarettes, Pipe     Quit date: 4/17/2016    Smokeless tobacco: Never Used    Alcohol use 2.4 oz/week     4 Glasses of wine per week      Comment: Daily- wine and beer       Allergies: Allergies   Allergen Reactions    Cymbalta [Duloxetine] Itching    Percocet [Oxycodone-Acetaminophen] Itching         Review of Systems     Review of Systems   Constitutional: Negative for fever. Gastrointestinal:        Hematemesis   Neurological: Positive for headaches. All other systems reviewed and are negative. Physical Exam     Patient Vitals for the past 12 hrs:   Temp Pulse Resp BP SpO2   05/04/18 0330 - 65 15 128/81 96 %   05/04/18 0230 - 61 13 132/75 94 %   05/04/18 0143 - - - - 95 %   05/04/18 0130 - (!) 57 16 (!) 155/94 95 %   05/04/18 0102 98.6 °F (37 °C) 63 13 (!) 167/95 99 %   05/04/18 0100 - 61 18 (!) 152/98 96 %     Physical Exam   Constitutional:   General:  Well-developed, well-nourished, nontoxic nondiaphoretic. Head:  Normocephalic atraumatic. Eyes:  Pupils midrange extraocular movements intact.   No pallor or conjunctival injection. Nose:  No rhinorrhea, inspection grossly normal.    Ears:  Grossly normal to inspection, no discharge. Mouth:  Mucous membranes moist, no appreciable intraoral lesion. Neck/Back:  Trachea midline, no asymmetry. Chest:  Grossly normal inspection, symmetric chest rise. Pulmonary:  Clear to auscultation bilaterally no wheezes rhonchi or rales. Cardiovascular:  S1-S2 no murmurs rubs or gallops. Abdomen: Soft, nontender, nondistended no guarding rebound or peritoneal signs. Extremities:  Grossly normal to inspection, peripheral pulses intact    Neurologic:  Alert and oriented no appreciable focal neurologic deficit. Skin:  Warm and dry  Psychiatric:  Grossly normal mood and affect. Nursing note reviewed, vital signs reviewed. Diagnostic Study Results     Labs -  Recent Results (from the past 12 hour(s))   EKG, 12 LEAD, INITIAL    Collection Time: 05/04/18 12:51 AM   Result Value Ref Range    Ventricular Rate 62 BPM    Atrial Rate 62 BPM    P-R Interval 136 ms    QRS Duration 84 ms    Q-T Interval 412 ms    QTC Calculation (Bezet) 418 ms    Calculated P Axis -18 degrees    Calculated R Axis 41 degrees    Calculated T Axis 54 degrees    Diagnosis       Normal sinus rhythm  Normal ECG  When compared with ECG of 10-MAY-2016 11:17,  No significant change was found     CBC WITH AUTOMATED DIFF    Collection Time: 05/04/18  1:00 AM   Result Value Ref Range    WBC 9.1 4.6 - 13.2 K/uL    RBC 4.45 (L) 4.70 - 5.50 M/uL    HGB 13.9 13.0 - 16.0 g/dL    HCT 39.3 36.0 - 48.0 %    MCV 88.3 74.0 - 97.0 FL    MCH 31.2 24.0 - 34.0 PG    MCHC 35.4 31.0 - 37.0 g/dL    RDW 13.2 11.6 - 14.5 %    PLATELET 336 126 - 174 K/uL    MPV 10.1 9.2 - 11.8 FL    NEUTROPHILS 51 40 - 73 %    LYMPHOCYTES 36 21 - 52 %    MONOCYTES 8 3 - 10 %    EOSINOPHILS 4 0 - 5 %    BASOPHILS 1 0 - 2 %    ABS. NEUTROPHILS 4.7 1.8 - 8.0 K/UL    ABS. LYMPHOCYTES 3.3 0.9 - 3.6 K/UL    ABS. MONOCYTES 0.7 0.05 - 1.2 K/UL    ABS. EOSINOPHILS 0.3 0.0 - 0.4 K/UL    ABS. BASOPHILS 0.1 0.0 - 0.1 K/UL    DF AUTOMATED     METABOLIC PANEL, BASIC    Collection Time: 05/04/18  1:00 AM   Result Value Ref Range    Sodium 137 136 - 145 mmol/L    Potassium 3.5 3.5 - 5.5 mmol/L    Chloride 104 100 - 108 mmol/L    CO2 25 21 - 32 mmol/L    Anion gap 8 3.0 - 18 mmol/L    Glucose 122 (H) 74 - 99 mg/dL    BUN 11 7.0 - 18 MG/DL    Creatinine 0.91 0.6 - 1.3 MG/DL    BUN/Creatinine ratio 12 12 - 20      GFR est AA >60 >60 ml/min/1.73m2    GFR est non-AA >60 >60 ml/min/1.73m2    Calcium 8.7 8.5 - 10.1 MG/DL   TROPONIN I    Collection Time: 05/04/18  1:00 AM   Result Value Ref Range    Troponin-I, Qt. <0.02 0.0 - 0.045 NG/ML   LIPASE    Collection Time: 05/04/18  1:00 AM   Result Value Ref Range    Lipase 172 73 - 393 U/L   HEPATIC FUNCTION PANEL    Collection Time: 05/04/18  1:00 AM   Result Value Ref Range    Protein, total 7.9 6.4 - 8.2 g/dL    Albumin 3.8 3.4 - 5.0 g/dL    Globulin 4.1 (H) 2.0 - 4.0 g/dL    A-G Ratio 0.9 0.8 - 1.7      Bilirubin, total 0.6 0.2 - 1.0 MG/DL    Bilirubin, direct 0.2 0.0 - 0.2 MG/DL    Alk. phosphatase 81 45 - 117 U/L    AST (SGOT) 86 (H) 15 - 37 U/L    ALT (SGPT) 81 (H) 16 - 61 U/L   TROPONIN I    Collection Time: 05/04/18  3:55 AM   Result Value Ref Range    Troponin-I, Qt. <0.02 0.0 - 0.045 NG/ML       Radiologic Studies -   XR CHEST PORT    (Results Pending)     Medical Decision Making     Provider Notes (Medical Decision Making):      I am the first provider for this patient. I reviewed the vital signs, available nursing notes, past medical history, past surgical history, family history and social history. Vital Signs-Reviewed the patient's vital signs. ED course:  Patient presents with left-sided chest pressure episode of vomiting reported blood, declined rectal exam he was more concerned that he is vomiting applying of his stomach which sounds like a Maddie-Deluca.   He is having persistent symptoms acute onset primary concern is for ACS less likely a intra-abdominal pathology, history history of alcohol usage no clinical signs of alcohol withdrawal at this time. Monitor: Normal sinus rhythm    EKG done at 0051 hours: Normal sinus rhythm heart rate 62 intervals within normal limits no ST changes or ectopy. Labs unremarkable troponin negative, does have mild abnormality of his LFTs is been seen in the past, likely related to his alcohol usage. Given the acute onset of his symptoms, will need a 3 hour repeat. XR: no infiltrate or PTX    EKG repeated at 0351 hrs.: Normal sinus rhythm heart rate is 66 intervals within normal limits no ST changes unchanged compared to #1    Repeat troponin negative    Vital signs remained stable, blood pressure elevated but he does have blood pressure medication at home deformity has abnormal liver function tests, cease alcohol intake and follow up with PCP for further management and testing    Patient's history, physical exam and laboratory evaluations were reviewed. Had discussion with the patient about the possible etiologies of chest pain. Heart score: 3    Patient was felt to be low risk for major adverse cardiac event, was  informed about the risks and benefits and alternatives of inpatient versus outpatient workup. At this time patient is stable for outpatient management including follow-up with primary care physician/cardiology for reevaluation within 72 hours. Patient is aware that no evaluation in the emergency department can ensure 0% risk, patient will return to the emergency department with any concerns. Disposition:    Discharged home      Portions of this chart were created with Dragon medical speech to text program.   Unrecognized errors may be present.               Follow-up Information     Follow up With Details Comments 7110 Koffi Foster MD In 2 days  [address]  [facility]  [facility] [address]  [phone] [facility] EMERGENCY DEPT  As needed, If symptoms worsen [address] [facility] [address]  [phone]           Discharge Medication List as of 5/4/2018  4:37 AM      CONTINUE these medications which have NOT CHANGED    Details   !! oxyCODONE IR (ROXICODONE) 5 mg immediate release tablet Take 1 tab PO BID  Indications: chronic pain, Print, Disp-60 Tab, R-0      !! oxyCODONE IR (ROXICODONE) 5 mg immediate release tablet Take 1 tab PO BID  Indications: chronic pain, Print, Disp-60 Tab, R-0      !! oxyCODONE IR (ROXICODONE) 5 mg immediate release tablet Take 1 tab po 1-2 times a day as needed for pain  Indications: chronic pain, Print, Disp-60 Tab, R-0      cyclobenzaprine (FLEXERIL) 10 mg tablet Take 0.5 Tabs by mouth two (2) times daily as needed for Muscle Spasm(s). , Normal, Disp-45 Tab, R-2      lisinopril-hydrochlorothiazide (PRINZIDE, ZESTORETIC) 20-25 mg per tablet Take 1 Tab by mouth daily. , Normal, Disp-90 Tab, R-3       !! - Potential duplicate medications found. Please discuss with provider.        _______________________________    Attestations:  [address] acting as a scribe for and in the presence of Xuan Oneill MD      May 04, 2018 at 12:52 AM       Provider Attestation:      I personally performed the services described in the documentation, reviewed the documentation, as recorded by the scribe in my presence, and it accurately and completely records my words and actions.  May 04, 2018 at 12:52 AM - Xuan Oneill MD    _______________________________

## 2018-05-04 NOTE — ED TRIAGE NOTES
Patient A/O x 4, complaining of headache x 3 days, chest feeling heavy and palpitations x tonight after taking 1/2 norvasc tablet for blood pressure. Patient denies SOB, abdominal pain. Patient states after drinking water x tonight he vomited.

## 2018-05-04 NOTE — ED NOTES
I have reviewed discharge instructions with the patient. The patient verbalized understanding. Discharge medications reviewed with patient and appropriate educational materials and side effects teaching were provided. I have reviewed the provider's instructions with the patient, answering all questions to his satisfaction. Pt signed paper discharge instructions removed all belongings and ambulated without distress or discomfort.

## 2018-05-04 NOTE — DISCHARGE INSTRUCTIONS
Chest Pain: Care Instructions  Your Care Instructions    There are many things that can cause chest pain. Some are not serious and will get better on their own in a few days. But some kinds of chest pain need more testing and treatment. Your doctor may have recommended a follow-up visit in the next 8 to 12 hours. If you are not getting better, you may need more tests or treatment. Even though your doctor has released you, you still need to watch for any problems. The doctor carefully checked you, but sometimes problems can develop later. If you have new symptoms or if your symptoms do not get better, get medical care right away. If you have worse or different chest pain or pressure that lasts more than 5 minutes or you passed out (lost consciousness), call 911 or seek other emergency help right away. A medical visit is only one step in your treatment. Even if you feel better, you still need to do what your doctor recommends, such as going to all suggested follow-up appointments and taking medicines exactly as directed. This will help you recover and help prevent future problems. How can you care for yourself at home? · Rest until you feel better. · Take your medicine exactly as prescribed. Call your doctor if you think you are having a problem with your medicine. · Do not drive after taking a prescription pain medicine. When should you call for help? Call 911 if:  ? · You passed out (lost consciousness). ? · You have severe difficulty breathing. ? · You have symptoms of a heart attack. These may include:  ¨ Chest pain or pressure, or a strange feeling in your chest.  ¨ Sweating. ¨ Shortness of breath. ¨ Nausea or vomiting. ¨ Pain, pressure, or a strange feeling in your back, neck, jaw, or upper belly or in one or both shoulders or arms. ¨ Lightheadedness or sudden weakness. ¨ A fast or irregular heartbeat.   After you call 911, the  may tell you to chew 1 adult-strength or 2 to 4 low-dose aspirin. Wait for an ambulance. Do not try to drive yourself. ?Call your doctor today if:  ? · You have any trouble breathing. ? · Your chest pain gets worse. ? · You are dizzy or lightheaded, or you feel like you may faint. ? · You are not getting better as expected. ? · You are having new or different chest pain. Where can you learn more? Go to http://zulma-susana.info/. Enter A120 in the search box to learn more about \"Chest Pain: Care Instructions. \"  Current as of: March 20, 2017  Content Version: 11.4  © 6729-8103 [employer]. Care instructions adapted under license by [employer] (which disclaims liability or warranty for this information). If you have questions about a medical condition or this instruction, always ask your healthcare professional. [address] any warranty or liability for your use of this information.

## 2018-05-05 ENCOUNTER — HOSPITAL ENCOUNTER (EMERGENCY)
Age: 58
Discharge: HOME | End: 2018-05-06
Attending: EMERGENCY MEDICINE
Payer: MEDICARE

## 2018-05-05 DIAGNOSIS — F10.930 ALCOHOL WITHDRAWAL SYNDROME WITHOUT COMPLICATION (HCC): ICD-10-CM

## 2018-05-05 DIAGNOSIS — I10 HYPERTENSION, UNSPECIFIED TYPE: Primary | ICD-10-CM

## 2018-05-05 LAB
ATRIAL RATE: 62 BPM
ATRIAL RATE: 66 BPM
CALCULATED P AXIS, ECG09: -18 DEGREES
CALCULATED P AXIS, ECG09: 1 DEGREES
CALCULATED R AXIS, ECG10: 41 DEGREES
CALCULATED R AXIS, ECG10: 43 DEGREES
CALCULATED T AXIS, ECG11: 51 DEGREES
CALCULATED T AXIS, ECG11: 54 DEGREES
DIAGNOSIS, 93000: NORMAL
DIAGNOSIS, 93000: NORMAL
P-R INTERVAL, ECG05: 136 MS
P-R INTERVAL, ECG05: 136 MS
Q-T INTERVAL, ECG07: 412 MS
Q-T INTERVAL, ECG07: 420 MS
QRS DURATION, ECG06: 84 MS
QRS DURATION, ECG06: 84 MS
QTC CALCULATION (BEZET), ECG08: 418 MS
QTC CALCULATION (BEZET), ECG08: 440 MS
VENTRICULAR RATE, ECG03: 62 BPM
VENTRICULAR RATE, ECG03: 66 BPM

## 2018-05-05 PROCEDURE — 99282 EMERGENCY DEPT VISIT SF MDM: CPT

## 2018-05-05 RX ORDER — LISINOPRIL 20 MG/1
20 TABLET ORAL DAILY
COMMUNITY
End: 2018-11-05

## 2018-05-06 VITALS
RESPIRATION RATE: 18 BRPM | HEIGHT: 66 IN | OXYGEN SATURATION: 100 % | HEART RATE: 69 BPM | TEMPERATURE: 98.7 F | WEIGHT: 170 LBS | SYSTOLIC BLOOD PRESSURE: 151 MMHG | BODY MASS INDEX: 27.32 KG/M2 | DIASTOLIC BLOOD PRESSURE: 90 MMHG

## 2018-05-06 NOTE — ED TRIAGE NOTES
Pt. Complains of high blood pressure for the past week or so. Pt. Reports being seen at [facility] and stated they didn't do anything for his blood pressure.

## 2018-05-06 NOTE — ED PROVIDER NOTES
EMERGENCY DEPARTMENT HISTORY AND PHYSICAL EXAM    11:46 PM      Date: 5/5/2018  Patient Name: Kayla Starkey. History of Presenting Illness     Chief Complaint   Patient presents with    Hypertension         History Provided By: Patient    Chief Complaint: Hypertension   Duration:  1 Week  Timing:  Constant  Location: N/A  Quality: N/A  Severity: N/A  Modifying Factors: None   Associated Symptoms: Headache       Additional History (Context): Kayla Starkey. is a 59 y.o. male with PMHx of HTN presenting to the ED c/o constant elevated blood pressures for the past week. Pt also c/o headache that he describes as pressure. Reports no modifying factors for his symptoms. Pt states he was seen at [facility] yesterday for high blood pressure and states \"they didn't do anything for me. \" Notes he has not drank any alcohol in 2 days and that he normally drinks \"3-4 beers a day. \" Denies any other symptoms or complaints. PCP: Brisa Rueda MD    Current Outpatient Prescriptions   Medication Sig Dispense Refill    lisinopril (PRINIVIL, ZESTRIL) 20 mg tablet Take 20 mg by mouth daily.  oxyCODONE IR (ROXICODONE) 5 mg immediate release tablet Take 1 tab po 1-2 times a day as needed for pain  Indications: chronic pain 60 Tab 0    lisinopril-hydrochlorothiazide (PRINZIDE, ZESTORETIC) 20-25 mg per tablet Take 1 Tab by mouth daily.  80 Tab 3       Past History     Past Medical History:  Past Medical History:   Diagnosis Date    Hypertension     Liver disease     Hepatitis C    Low back pain     Lumbar stenosis     L3-L4, L4-L5    Migraine headache     Numbness and tingling in left hand     Radiculopathy     Right L4-L5    Right leg pain     Sensation of cold in leg     Radiating into right posterior buttock and anterior thigh    Unsteady gait        Past Surgical History:  Past Surgical History:   Procedure Laterality Date    HX CERVICAL FUSION  05/23/2016    ACDF C3/4 C4/5 C5/6 C6/7, Dr. Zandra Kong NEUROLOGICAL PROCEDURE UNLISTED  05/25/16    hematoma removal from cervical spine       Family History:  Family History   Problem Relation Age of Onset    Hypertension Mother     Heart Disease Mother     Diabetes Mother     Hypertension Father        Social History:  Social History   Substance Use Topics    Smoking status: Former Smoker     Packs/day: 0.50     Years: 40.00     Types: Cigarettes, Pipe     Quit date: 4/17/2016    Smokeless tobacco: Never Used    Alcohol use 2.4 oz/week     4 Glasses of wine per week      Comment: Daily- wine and beer       Allergies: Allergies   Allergen Reactions    Cymbalta [Duloxetine] Itching    Percocet [Oxycodone-Acetaminophen] Itching         Review of Systems       Review of Systems   Constitutional: Negative. HENT: Negative. Eyes: Negative. Respiratory: Negative. Cardiovascular:        + High blood pressure   Gastrointestinal: Negative. Endocrine: Negative. Genitourinary: Negative. Musculoskeletal: Negative. Skin: Negative. Allergic/Immunologic: Negative. Neurological: Positive for headaches. Hematological: Negative. Psychiatric/Behavioral: Negative. All other systems reviewed and are negative. Physical Exam     Visit Vitals    BP (!) 157/100 (BP 1 Location: Left arm, BP Patient Position: At rest)    Pulse 71    Temp 98.7 °F (37.1 °C)    Resp 18    Ht 5' 6\" (1.676 m)    Wt 77.1 kg (170 lb)    SpO2 100%    BMI 27.44 kg/m2         Physical Exam   Constitutional: He is oriented to person, place, and time. He appears well-developed and well-nourished. No distress. HENT:   Head: Normocephalic. Mouth/Throat: Oropharynx is clear and moist.   Eyes: Conjunctivae and EOM are normal. Pupils are equal, round, and reactive to light. Neck: Normal range of motion. Neck supple. Cardiovascular: Normal rate, regular rhythm, normal heart sounds and intact distal pulses. No murmur heard.   Pulmonary/Chest: Effort normal and breath sounds normal. No respiratory distress. He has no wheezes. He has no rales. He exhibits no tenderness. Abdominal: Soft. Bowel sounds are normal. He exhibits no distension. There is no tenderness. There is no rebound. Musculoskeletal: Normal range of motion. He exhibits no edema or tenderness. Neurological: He is alert and oriented to person, place, and time. No cranial nerve deficit. He exhibits normal muscle tone. Coordination normal.   Skin: Skin is warm and dry. No rash noted. Psychiatric: He has a normal mood and affect. His behavior is normal. Judgment and thought content normal.   Nursing note and vitals reviewed. Diagnostic Study Results     Labs -  No results found for this or any previous visit (from the past 12 hour(s)). Radiologic Studies -   No orders to display         Medical Decision Making   I am the first provider for this patient. I reviewed the vital signs, available nursing notes, past medical history, past surgical history, family history and social history. Vital Signs-Reviewed the patient's vital signs. Pulse Oximetry Analysis -  100% on room air, normal     Records Reviewed: Nursing Notes and Old Medical Records (Time of Review: 11:46 PM)    Provider Notes (Medical Decision Making): Dif Dx: alcohol withdrawal, anxiety, HTN, dehydration, GERD, PUD    ED Course: Progress Notes, Reevaluation, and Consults:  12:21 AM Reviewed pt's medical records from [facility]. Informed pt that he may be in early alcohol withdrawal. Pt states he will refrain from drinking alcohol. I adjusted pts medications for his blood pressure and instructed pt to follow up with his PCP in 2 days and return here as needed. Provider Notes (Medical Decision Making): Dif Dx: alcohol withdrawal, anxiety, HTN, dehydration, GERD, PUD    Diagnosis     Clinical Impression:   1. Hypertension, unspecified type    2.  Alcohol withdrawal syndrome without complication (HCC)        Disposition: Discharged     Follow-up Information     Follow up With Details Comments [address] [facility] Evonne Wright MD Call in 2 days For follow up [address] [place] [address] [address] [facility]      [address] [facility] EMERGENCY DEPT  As needed, If symptoms worsen [address] [facility]  [phone]           Patient's Medications   Start Taking    No medications on file   Continue Taking    LISINOPRIL (PRINIVIL, ZESTRIL) 20 MG TABLET    Take 20 mg by mouth daily. LISINOPRIL-HYDROCHLOROTHIAZIDE (PRINZIDE, ZESTORETIC) 20-25 MG PER TABLET    Take 1 Tab by mouth daily. OXYCODONE IR (ROXICODONE) 5 MG IMMEDIATE RELEASE TABLET    Take 1 tab po 1-2 times a day as needed for pain  Indications: chronic pain   These Medications have changed    No medications on file   Stop Taking    CYCLOBENZAPRINE (FLEXERIL) 10 MG TABLET    Take 0.5 Tabs by mouth two (2) times daily as needed for Muscle Spasm(s). OXYCODONE IR (ROXICODONE) 5 MG IMMEDIATE RELEASE TABLET    Take 1 tab PO BID  Indications: chronic pain    OXYCODONE IR (ROXICODONE) 5 MG IMMEDIATE RELEASE TABLET    Take 1 tab PO BID  Indications: chronic pain     _______________________________    Attestations:  Scribe Attestation     Estelle Andre acting as a scribe for and in the presence of Alma Delia Watkisn MD      May 06, 2018 at 12:24 AM       Provider Attestation:      I personally performed the services described in the documentation, reviewed the documentation, as recorded by the scribe in my presence, and it accurately and completely records my words and actions.  May 06, 2018 at 12:24 AM - Alma Delia Watkins MD    _______________________________

## 2018-05-06 NOTE — DISCHARGE INSTRUCTIONS
High Blood Pressure: Care Instructions  Your Care Instructions    If your blood pressure is usually above 140/90, you have high blood pressure, or hypertension. That means the top number is 140 or higher or the bottom number is 90 or higher, or both. Despite what a lot of people think, high blood pressure usually doesn't cause headaches or make you feel dizzy or lightheaded. It usually has no symptoms. But it does increase your risk for heart attack, stroke, and kidney or eye damage. The higher your blood pressure, the more your risk increases. Your doctor will give you a goal for your blood pressure. Your goal will be based on your health and your age. An example of a goal is to keep your blood pressure below 140/90. Lifestyle changes, such as eating healthy and being active, are always important to help lower blood pressure. You might also take medicine to reach your blood pressure goal.  Follow-up care is a key part of your treatment and safety. Be sure to make and go to all appointments, and call your doctor if you are having problems. It's also a good idea to know your test results and keep a list of the medicines you take. How can you care for yourself at home? Medical treatment  · If you stop taking your medicine, your blood pressure will go back up. You may take one or more types of medicine to lower your blood pressure. Be safe with medicines. Take your medicine exactly as prescribed. Call your doctor if you think you are having a problem with your medicine. · Talk to your doctor before you start taking aspirin every day. Aspirin can help certain people lower their risk of a heart attack or stroke. But taking aspirin isn't right for everyone, because it can cause serious bleeding. · See your doctor regularly. You may need to see the doctor more often at first or until your blood pressure comes down.   · If you are taking blood pressure medicine, talk to your doctor before you take decongestants or anti-inflammatory medicine, such as ibuprofen. Some of these medicines can raise blood pressure. · Learn how to check your blood pressure at home. Lifestyle changes  · Stay at a healthy weight. This is especially important if you put on weight around the waist. Losing even 10 pounds can help you lower your blood pressure. · If your doctor recommends it, get more exercise. Walking is a good choice. Bit by bit, increase the amount you walk every day. Try for at least 30 minutes on most days of the week. You also may want to swim, bike, or do other activities. · Avoid or limit alcohol. Talk to your doctor about whether you can drink any alcohol. · Try to limit how much sodium you eat to less than 2,300 milligrams (mg) a day. Your doctor may ask you to try to eat less than 1,500 mg a day. · Eat plenty of fruits (such as bananas and oranges), vegetables, legumes, whole grains, and low-fat dairy products. · Lower the amount of saturated fat in your diet. Saturated fat is found in animal products such as milk, cheese, and meat. Limiting these foods may help you lose weight and also lower your risk for heart disease. · Do not smoke. Smoking increases your risk for heart attack and stroke. If you need help quitting, talk to your doctor about stop-smoking programs and medicines. These can increase your chances of quitting for good. When should you call for help? Call 911 anytime you think you may need emergency care. This may mean having symptoms that suggest that your blood pressure is causing a serious heart or blood vessel problem. Your blood pressure may be over 180/110. ? For example, call 911 if:  ? · You have symptoms of a heart attack. These may include:  ¨ Chest pain or pressure, or a strange feeling in the chest.  ¨ Sweating. ¨ Shortness of breath. ¨ Nausea or vomiting. ¨ Pain, pressure, or a strange feeling in the back, neck, jaw, or upper belly or in one or both shoulders or arms.   ¨ Lightheadedness or sudden weakness. ¨ A fast or irregular heartbeat. ? · You have symptoms of a stroke. These may include:  ¨ Sudden numbness, tingling, weakness, or loss of movement in your face, arm, or leg, especially on only one side of your body. ¨ Sudden vision changes. ¨ Sudden trouble speaking. ¨ Sudden confusion or trouble understanding simple statements. ¨ Sudden problems with walking or balance. ¨ A sudden, severe headache that is different from past headaches. ? · You have severe back or belly pain. ?Do not wait until your blood pressure comes down on its own. Get help right away. ?Call your doctor now or seek immediate care if:  ? · Your blood pressure is much higher than normal (such as 180/110 or higher), but you don't have symptoms. ? · You think high blood pressure is causing symptoms, such as:  ¨ Severe headache. ¨ Blurry vision. ? Watch closely for changes in your health, and be sure to contact your doctor if:  ? · Your blood pressure measures 140/90 or higher at least 2 times. That means the top number is 140 or higher or the bottom number is 90 or higher, or both. ? · You think you may be having side effects from your blood pressure medicine. ? · Your blood pressure is usually normal, but it goes above normal at least 2 times. Where can you learn more? Go to http://zulma-susana.info/. Enter P522 in the search box to learn more about \"High Blood Pressure: Care Instructions. \"  Current as of: September 21, 2016  Content Version: 11.4  © 2344-5386 [employer]. Care instructions adapted under license by [employer] (which disclaims liability or warranty for this information). If you have questions about a medical condition or this instruction, always ask your healthcare professional. [place] [address] any warranty or liability for your use of this information.          Alcohol Detoxification and Withdrawal: Care Instructions  Your Care Instructions    If you drink alcohol regularly and then suddenly stop, you may go through some physical and emotional problems while the alcohol clears out of your system. Clearing the alcohol from your body is called detoxification, or detox. Physical and emotional problems that may happen during detox are called withdrawal.  Symptoms of withdrawal can be scary and dangerous. Mild symptoms include nausea and vomiting, sweating, shakiness, and intense worry. Severe symptoms include being confused and irritable, feeling things on your body that are not there, seeing or hearing things that are not there, and trembling. You may even have seizures. If your symptoms become severe you must see a doctor. People who drink large amounts of alcohol should not try to detox at home. A person can die of severe alcohol withdrawal.  Symptoms of alcohol withdrawal may begin from 4 to 12 hours after you stop drinking. But they may not start for several days after the last drink. They can last a few days. It is hard to stop drinking. But when you have cleared the alcohol from your system, you will be able to start the next part of your life, free from the burden of being dependent. Follow-up care is a key part of your treatment and safety. Be sure to make and go to all appointments, and call your doctor if you are having problems. It's also a good idea to know your test results and keep a list of the medicines you take. How can you care for yourself at home? · Before you stop drinking, talk to your doctor about how you plan to stop. Be sure to be completely honest with him or her about how much you have been drinking. Your doctor will figure out whether you need to detox in a supervised medical center. · Take your medicines exactly as prescribed. Call your doctor if you think you are having a problem with your medicine. · Make sure someone you trust is with you the whole time.  Have friends and family members take turns staying with you until you are done with detox. · Put a list of emergency numbers near the phone. This should include your doctor, the police, the nearest hospital and emergency room, and neighbors who can help if needed. · Make sure all alcohol is removed from the house before you start. This includes beverages as well as medicines, rubbing alcohol, and certain flavorings like vanilla extract. · Keep \"drinking buddies\" away during the time you are going through detox. · Make your surroundings calm. Soft lights, soft music, and a comfortable place to sit or lie down can help make the process easier. · Drink lots of fluids and eat snacks such as fruit, cheese and crackers, and pretzels. Foods high in carbohydrate may help reduce the craving for alcohol. · Understand that detox is going to be hard. · Keep in mind that the people watching over you during detox are there to help. Explain to them before you start that you may not act like yourself until detox is finished. · Consider joining a support group such as Alcoholics Anonymous. Sharing your experiences with other people who face similar challenges may help you feel less overwhelmed. · Keep the numbers for these national suicide hotlines: 5-223-545-TALK (3-780.441.2483) and 4-186-YXBLCHN (1-756.512.1769). If you or someone you know talks about suicide or feeling hopeless, get help right away. When should you call for help? Call 911 anytime you think you may need emergency care. For example, call if:  ? · You feel you cannot stop from hurting yourself or someone else. ? · You vomit many times and cannot stop. ? · You vomit blood or what looks like coffee grounds. ? · You have trouble breathing or are breathing very fast.   ? · Your heart beats more than 120 times a minute and will not slow down. ? · You have chest pain. ? · You have a seizure. ? · You see or feel things that are not there (hallucinate).    ?If you are caring for someone who is going through detox, call if:  ? · The person passes out (loses consciousness). ? · The person sees or feels things that are not there and sees or hears the same things many times. ? · The person is very agitated and will not calm down. ? · The person becomes violent or threatens to be violent. ? · The person has a seizure. ?Call your doctor now or seek immediate medical care if:  ? · You have a high fever. ? · You have severe belly pain. ? · You are very shaky. ? Watch closely for changes in your health, and be sure to contact your doctor if:  ? · You do not get better as expected. Where can you learn more? Go to http://zulma-susana.info/. Enter [phone] in the search box to learn more about \"Alcohol Detoxification and Withdrawal: Care Instructions. \"  Current as of: November 3, 2016  Content Version: 11.4  © 1914-3480 [employer]. Care instructions adapted under license by "[employer]" (which disclaims liability or warranty for this information). If you have questions about a medical condition or this instruction, always ask your healthcare professional. [address] any warranty or liability for your use of this information.

## 2018-06-11 ENCOUNTER — OFFICE VISIT (OUTPATIENT)
Dept: ORTHOPEDIC SURGERY | Age: 58
End: 2018-06-11

## 2018-06-11 VITALS
WEIGHT: 167.4 LBS | SYSTOLIC BLOOD PRESSURE: 130 MMHG | HEIGHT: 66 IN | BODY MASS INDEX: 26.9 KG/M2 | HEART RATE: 86 BPM | DIASTOLIC BLOOD PRESSURE: 95 MMHG | TEMPERATURE: 98.2 F | RESPIRATION RATE: 16 BRPM | OXYGEN SATURATION: 98 %

## 2018-06-11 DIAGNOSIS — M48.062 SPINAL STENOSIS OF LUMBAR REGION WITH NEUROGENIC CLAUDICATION: Primary | ICD-10-CM

## 2018-06-11 DIAGNOSIS — Z79.891 ENCOUNTER FOR LONG-TERM USE OF OPIATE ANALGESIC: ICD-10-CM

## 2018-06-11 DIAGNOSIS — M48.062 LUMBAR STENOSIS WITH NEUROGENIC CLAUDICATION: ICD-10-CM

## 2018-06-11 RX ORDER — CYCLOBENZAPRINE HCL 10 MG
TABLET ORAL
COMMUNITY
End: 2018-06-11 | Stop reason: SDUPTHER

## 2018-06-11 RX ORDER — CYCLOBENZAPRINE HCL 10 MG
10 TABLET ORAL
Qty: 90 TAB | Refills: 2 | Status: SHIPPED | OUTPATIENT
Start: 2018-06-11 | End: 2019-06-11 | Stop reason: SDUPTHER

## 2018-06-11 RX ORDER — OXYCODONE HYDROCHLORIDE 5 MG/1
TABLET ORAL
Qty: 60 TAB | Refills: 0 | Status: SHIPPED | OUTPATIENT
Start: 2018-06-11 | End: 2018-06-21 | Stop reason: SDUPTHER

## 2018-06-11 RX ORDER — AMLODIPINE BESYLATE 5 MG/1
5 TABLET ORAL DAILY
COMMUNITY

## 2018-06-11 NOTE — MR AVS SNAPSHOT
Casey [place] 
 
 
 [address] [facility] [address] [phone] Patient: Renea Oppenheim. MRN: [id number] Visit Information Date & Time Provider Department Dept. Phone Encounter #  
 6/11/2018  8:30 AM Diallo Campos NP [facility] [phone] [id number] Follow-up Instructions Return in about 3 months (around 9/11/2018). Your Appointments 6/20/2018 10:00 AM  
New Patient with MD [place] [address] Primary Care [facility]) Appt Note: NEW PATIENT, HYPERTENSION,  
 [address], [address] [id number] [phone]  
  
   
 [address] Upcoming Health Maintenance Date Due DTaP/Tdap/Td series (1 - Tdap) 7/27/1979 FOBT Q 1 YEAR AGE 50-75 7/27/2008 ZOSTER VACCINE AGE 60> 5/27/2018 Influenza Age 5 to Adult 8/1/2018 Allergies as of 6/11/2018  Review Complete On: 6/11/2018 By: Diallo Campos NP Severity Noted Reaction Type Reactions Cymbalta [Duloxetine]  03/05/2018    Itching Percocet [Oxycodone-acetaminophen]  07/05/2016    Itching Current Immunizations  Reviewed on 5/29/2016 Name Date Influenza Vaccine (Quad) PF 2/3/2016 Not reviewed this visit You Were Diagnosed With   
  
 Codes Comments Spinal stenosis of lumbar region with neurogenic claudication    -  Primary ICD-10-CM: H07.257 
ICD-9-CM: 724.03 Encounter for long-term use of opiate analgesic     ICD-10-CM: P51.176 ICD-9-CM: V58.69 Lumbar stenosis with neurogenic claudication     ICD-10-CM: M48.062 
ICD-9-CM: 724.03 Vitals BP Pulse Temp Resp Height(growth percentile) Weight(growth percentile) (!) 130/95 86 98.2 °F (36.8 °C) 16 5' 6\" (1.676 m) 167 lb 6.4 oz (75.9 kg) SpO2 BMI Smoking Status 98% 27.02 kg/m2 Former Smoker BMI and BSA Data Body Mass Index Body Surface Area  
 27.02 kg/m 2 1.88 m 2 Preferred Pharmacy Pharmacy Name Phone [address] [place] [address]. [phone] Your Updated Medication List  
  
   
This list is accurate as of 6/11/18  9:28 AM.  Always use your most recent med list.  
  
  
  
  
 cyclobenzaprine 10 mg tablet Commonly known as:  FLEXERIL Take 1 Tab by mouth three (3) times daily as needed for Muscle Spasm(s). lisinopril 20 mg tablet Commonly known as:  Pecola Cypher Take 20 mg by mouth daily. lisinopril-hydroCHLOROthiazide 20-25 mg per tablet Commonly known as:  Marjean Jolene Take 1 Tab by mouth daily. NORVASC 5 mg tablet Generic drug:  amLODIPine Take 5 mg by mouth daily. oxyCODONE IR 5 mg immediate release tablet Commonly known as:  Landry Eli Take 1 tab po 1-2 times a day as needed for pain  Indications: chronic pain Prescriptions Printed Refills  
 oxyCODONE IR (ROXICODONE) 5 mg immediate release tablet 0 Sig: Take 1 tab po 1-2 times a day as needed for pain  Indications: chronic pain  
 Class: Print Prescriptions Sent to Pharmacy Refills  
 cyclobenzaprine (FLEXERIL) 10 mg tablet 2 Sig: Take 1 Tab by mouth three (3) times daily as needed for Muscle Spasm(s). Class: Normal  
 Pharmacy: [address] [place] [address] [address] .  #: [phone] Route: Oral  
  
We Performed the Following SCHEDULE SURGERY [MLU0558 Custom] Follow-up Instructions Return in about 3 months (around 9/11/2018). To-Do List   
 06/11/2018 Lab:  DRUG SCREEN UR - W/ CONFIRM Patient Instructions Back Pain, Emergency or Urgent Symptoms: Care Instructions Your Care Instructions Many people have back pain at one time or another.  In most cases, pain gets better with self-care that includes over-the-counter pain medicine, ice, heat, and exercises. Unless you have symptoms of a severe injury or heart attack, you may be able to give yourself a few days before you call a doctor. But some back problems are very serious. Do not ignore symptoms that need to be checked right away. Follow-up care is a key part of your treatment and safety. Be sure to make and go to all appointments, and call your doctor if you are having problems. It's also a good idea to know your test results and keep a list of the medicines you take. How can you care for yourself at home? · Sit or lie in positions that are most comfortable and that reduce your pain. Try one of these positions when you lie down: ¨ Lie on your back with your knees bent and supported by large pillows. ¨ Lie on the floor with your legs on the seat of a sofa or chair. [place] on your side with your knees and hips bent and a pillow between your legs. ¨ Lie on your stomach if it does not make pain worse. · Do not sit up in bed, and avoid soft couches and twisted positions. Bed rest can help relieve pain at first, but it delays healing. Avoid bed rest after the first day. · Change positions every 30 minutes. If you must sit for long periods of time, take breaks from sitting. Get up and walk around, or lie flat. · Try using a heating pad on a low or medium setting, for 15 to 20 minutes every 2 or 3 hours. Try a warm shower in place of one session with the heating pad. You can also buy single-use heat wraps that last up to 8 hours. You can also try ice or cold packs on your back for 10 to 20 minutes at a time, several times a day. (Put a thin cloth between the ice pack and your skin.) This reduces pain and makes it easier to be active and exercise. · Take pain medicines exactly as directed. ¨ If the doctor gave you a prescription medicine for pain, take it as prescribed. ¨ If you are not taking a prescription pain medicine, ask your doctor if you can take an over-the-counter medicine. When should you call for help? Call 911 anytime you think you may need emergency care. For example, call if: 
? · You are unable to move a leg at all. ? · You have back pain with severe belly pain. ? · You have symptoms of a heart attack. These may include: ¨ Chest pain or pressure, or a strange feeling in the chest. 
¨ Sweating. ¨ Shortness of breath. ¨ Nausea or vomiting. ¨ Pain, pressure, or a strange feeling in the back, neck, jaw, or upper belly or in one or both shoulders or arms. ¨ Lightheadedness or sudden weakness. ¨ A fast or irregular heartbeat. After you call 911, the  may tell you to chew 1 adult-strength or 2 to 4 low-dose aspirin. Wait for an ambulance. Do not try to drive yourself. ?Call your doctor now or seek immediate medical care if: 
? · You have new or worse symptoms in your arms, legs, chest, belly, or buttocks. Symptoms may include: ¨ Numbness or tingling. ¨ Weakness. ¨ Pain. ? · You lose bladder or bowel control. ? · You have back pain and: 
¨ You have injured your back while lifting or doing some other activity. Call if the pain is severe, has not gone away after 1 or 2 days, and you cannot do your normal daily activities. ¨ You have had a back injury before that needed treatment. ¨ Your pain has lasted longer than 4 weeks. ¨ You have had weight loss you cannot explain. ¨ You are age 48 or older. ¨ You have cancer now or have had it before. ? Watch closely for changes in your health, and be sure to contact your doctor if you are not getting better as expected. Where can you learn more? Go to http://zulma-susana.info/. Enter E876 in the search box to learn more about \"Back Pain, Emergency or Urgent Symptoms: Care Instructions. \" Current as of: March 20, 2017 Content Version: 11.4 © 5879-6570 [employer].  Care instructions adapted under license by [employer] (which disclaims liability or warranty for this information). If you have questions about a medical condition or this instruction, always ask your healthcare professional. [address] any warranty or liability for your use of this information. Please provide this summary of care documentation to your next provider. Your primary care clinician is listed as [employer]. If you have any questions after today's visit, please call [phone].

## 2018-06-11 NOTE — PATIENT INSTRUCTIONS
Back Pain, Emergency or Urgent Symptoms: Care Instructions  Your Care Instructions    Many people have back pain at one time or another. In most cases, pain gets better with self-care that includes over-the-counter pain medicine, ice, heat, and exercises. Unless you have symptoms of a severe injury or heart attack, you may be able to give yourself a few days before you call a doctor. But some back problems are very serious. Do not ignore symptoms that need to be checked right away. Follow-up care is a key part of your treatment and safety. Be sure to make and go to all appointments, and call your doctor if you are having problems. It's also a good idea to know your test results and keep a list of the medicines you take. How can you care for yourself at home? · Sit or lie in positions that are most comfortable and that reduce your pain. Try one of these positions when you lie down:  ¨ Lie on your back with your knees bent and supported by large pillows. ¨ Lie on the floor with your legs on the seat of a sofa or chair. [place]  on your side with your knees and hips bent and a pillow between your legs. ¨ Lie on your stomach if it does not make pain worse. · Do not sit up in bed, and avoid soft couches and twisted positions. Bed rest can help relieve pain at first, but it delays healing. Avoid bed rest after the first day. · Change positions every 30 minutes. If you must sit for long periods of time, take breaks from sitting. Get up and walk around, or lie flat. · Try using a heating pad on a low or medium setting, for 15 to 20 minutes every 2 or 3 hours. Try a warm shower in place of one session with the heating pad. You can also buy single-use heat wraps that last up to 8 hours. You can also try ice or cold packs on your back for 10 to 20 minutes at a time, several times a day. (Put a thin cloth between the ice pack and your skin.) This reduces pain and makes it easier to be active and exercise.   · Take pain medicines exactly as directed. ¨ If the doctor gave you a prescription medicine for pain, take it as prescribed. ¨ If you are not taking a prescription pain medicine, ask your doctor if you can take an over-the-counter medicine. When should you call for help? Call 911 anytime you think you may need emergency care. For example, call if:  ? · You are unable to move a leg at all. ? · You have back pain with severe belly pain. ? · You have symptoms of a heart attack. These may include:  ¨ Chest pain or pressure, or a strange feeling in the chest.  ¨ Sweating. ¨ Shortness of breath. ¨ Nausea or vomiting. ¨ Pain, pressure, or a strange feeling in the back, neck, jaw, or upper belly or in one or both shoulders or arms. ¨ Lightheadedness or sudden weakness. ¨ A fast or irregular heartbeat. After you call 911, the  may tell you to chew 1 adult-strength or 2 to 4 low-dose aspirin. Wait for an ambulance. Do not try to drive yourself. ?Call your doctor now or seek immediate medical care if:  ? · You have new or worse symptoms in your arms, legs, chest, belly, or buttocks. Symptoms may include:  ¨ Numbness or tingling. ¨ Weakness. ¨ Pain. ? · You lose bladder or bowel control. ? · You have back pain and:  ¨ You have injured your back while lifting or doing some other activity. Call if the pain is severe, has not gone away after 1 or 2 days, and you cannot do your normal daily activities. ¨ You have had a back injury before that needed treatment. ¨ Your pain has lasted longer than 4 weeks. ¨ You have had weight loss you cannot explain. ¨ You are age 48 or older. ¨ You have cancer now or have had it before. ? Watch closely for changes in your health, and be sure to contact your doctor if you are not getting better as expected. Where can you learn more? Go to http://zulma-susana.info/.   Enter L730 in the search box to learn more about \"Back Pain, Emergency or Urgent Symptoms: Care Instructions. \"  Current as of: March 20, 2017  Content Version: 11.4  © 8828-6210 [employer]. Care instructions adapted under license by [employer] (which disclaims liability or warranty for this information). If you have questions about a medical condition or this instruction, always ask your healthcare professional. [place] [address] any warranty or liability for your use of this information.

## 2018-06-11 NOTE — PROGRESS NOTES
[address]  [address], [address]  [place], [address] [place] [address] Street  Phone: [phone]  Fax: [phone]  PROGRESS NOTE  Patient: Edi Carmona. MRN: [id number]       SSN: [id number]  YOB: 1960        AGE: 59 y.o. SEX: male  Body mass index is 27.02 kg/(m^2). PCP: Mayela Ann MD  06/11/18    Chief Complaint   Patient presents with    Back Pain     F/U    Leg Pain       HISTORY OF PRESENT ILLNESS:  Edi Carmona. is a 59 y.o.  male with history of chronic low back and BLE sciatica pain for 3 years and he has RLE pain from knee to ankle for 2-3 years with walking, neurogenic in nature. No signs of DVT. Prior history of back problems: recurrent self limited episodes of low back pain in the past and previous osteoarthritis of lumbar spine, and severe spinal stenosis L3-5 with 4 mm canal.  He had an ACDF C3-4 in 2016 for right-sided myelopathy, he has some mild residual weakness on that side. He developed a hematoma after the surgery. He reports that his RLE has hurt ever since. He has a hx of HTN and liver disease. He has tried; PT-Yes,  Non-opioid medications Yes, and spinal injections Yes. Pain is aching, burning, numbing and tingling. Pain is worse with walking, lifting, twisting, and standing and affects sleep, work and recreational activities  and his ability to perform ADLs. Pain is better with relaxation, pain medication and lying down. 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 His symptoms are stenotic in nature. He wants to avoid surgery at this time. We discussed urgent symptoms. States he has been using Roxicodone 5mg BID and Flexeril 10mg PRN with moderate, complete, relief. He has tried and failed Cymbalta. Reports that pain would be a 8/10 w/out medication and that it goes down to a 4/10 after medication. This enables the pt to be functional, achieve ADLs and engage in social activities.  Denies bladder/bowel dysfunction, saddle paresthesia, weakness, gait disturbance, or other neurological deficits. Denies chills, fever,night sweats, unexplained weight loss/weight gain, chest pain, sob or anxiety. Reports no new medical issues or hospitalizations since the last visit. Pt at this time desires to  continue with current care/proceed with medication evaluation/proceed with evaluation of back pain. Opioid Assessment    Least pain over the last week has been 4/10. Worst pain over the last week has been 7/10. Opioid Risk Tool Reviewed: YES, score: 0  Aberrant behaviors: None. Urine Drug Screen: due - order placed. Controlled substance agreement on file: YES.  reviewed:yes  Pill count is consistent with his prescription: n/a  Concomitant use of a benzodiazepine: no  MME is 15  Narcan not available   Also,  abstinence syndrome was reviewed and discussed with her today N/A    Risks and benefits of ongoing opiate therapy have been reviewed with the patient. No pain behaviors. Denies thoughts of harming self or others. Pt has a good risk to benefit ratio which allows the pt to function in a home environment without side effects      ASSESSMENT   Diagnoses and all orders for this visit:    1. Spinal stenosis of lumbar region with neurogenic claudication    2. Encounter for long-term use of opiate analgesic  -     DRUG SCREEN UR - W/ CONFIRM; Future    3. Lumbar stenosis with neurogenic claudication  -     DRUG SCREEN UR - W/ CONFIRM; Future  -     SCHEDULE SURGERY  -     oxyCODONE IR (ROXICODONE) 5 mg immediate release tablet; Take 1 tab po 1-2 times a day as needed for pain  Indications: chronic pain    Other orders  -     cyclobenzaprine (FLEXERIL) 10 mg tablet; Take 1 Tab by mouth three (3) times daily as needed for Muscle Spasm(s). IMPRESSION AND PLAN:  This is a chronic problem that is not changed, stable.   Per review of available records and patients , there are not sign of overuse, misuse, diversion, or concerning side effects. Today we reviewed: the risk of overdose, addiction, and dependency proper storage and disposal of medications the goals of treatment (improve functionality, quality of life, and pain) alternative treatment options including non-narcotic modalities the risks and benefits of continuing with a narcotic based pain regimen  The following changes were made to the patients current treatment plan: nothing, medications refilled. 1) Pt was given information on back urgent symptoms   2) Continue Flexeril and Roxicodone  3) L2/3 BO  4) Discussed surgery, pt declines. 4) Mr. Jr Javier has a reminder for a \"due or due soon\" health maintenance. I have asked that he contact his primary care provider, Concha [place] MD, for follow-up on this health maintenance. 5) We have informed patient to notify us for immediate appointment if he has any worsening neurogical symptoms or if an emergency situation presents, then call 911  6) Pt will follow-up in 3 mo w/me. Subjective    Work Disability    Smoking Status Non-smoker    Pain Scale: 7/10    Pain Assessment  3/5/2018   Location of Pain Back; Hip   Pain Location Comment -   Location Modifiers Right   Severity of Pain 7   Quality of Pain (No Data)   Quality of Pain Comment numbness, tingling   Duration of Pain -   Frequency of Pain Constant   Date Pain First Started -   Aggravating Factors Walking;Standing;Bending   Aggravating Factors Comment -   Limiting Behavior -   Relieving Factors (No Data)   Relieving Factors Comment pain med   Result of Injury -         REVIEW OF SYSTEMS  Constitutional: Negative for fever, chills, or weight change. Respiratory: Negative for cough or shortness of breath. Cardiovascular: Negative for chest pain or palpitations. Gastrointestinal: Negative for incontinence, acid reflux, change in bowel habits, or constipation. Genitourinary: Negative for incontinence, dysuria and flank pain. Musculoskeletal: Positive for Low back and BLE R>L pain. See HPI. Skin: Negative for rash. Neurological:BLE L5  radiculopathy. See HPI. Endo/Heme/Allergies: Negative. Psychiatric/Behavioral: Negative. PHYSICAL EXAMINATION  Visit Vitals    BP (!) 130/95    Pulse 86    Temp 98.2 °F (36.8 °C)    Resp 16    Ht 5' 6\" (1.676 m)    Wt 167 lb 6.4 oz (75.9 kg)    SpO2 98%    BMI 27.02 kg/m2         Accompanied by Self. Constitutional:  Well developed, well nourished, in no acute distress. Psychiatric: Affect and mood are appropriate. Integumentary: No rashes or abrasions noted on exposed areas. Cardiovascular/Peripheral Vascular: +2 radial & pedal pulses. No peripheral edema is noted. Lymphatic:  No evidence of lymphedema. No cervical lymphadenopathy. SPINE/MUSCULOSKELETAL EXAM    Cervical spine:  Neck is midline. Normal muscle tone. No focal atrophy is noted. Neck ROM decreased with flexion, extension, turning right, turning left. Shoulder ROM intact. Tenderness to palpation bilateral trapezii. Negative Spurling's sign. Negative Tinel's sign. Negative Vallejo's sign. Sensation grossly intact to light touch. Lumbar spine:  No rash, ecchymosis, or gross obliquity. No fasciculations. No focal atrophy is noted. Range of motion is pain with extension. Tenderness to palpation bilateral and mid-low back. No tenderness to palpation at the sciatic notch. SI joints non-tender. Trochanters non tender. Straight leg raise negative    Sensation grossly intact to light touch. MOTOR:     Biceps Triceps Deltoids Wrist Ext Wrist Flex Hand Intrin   Right 4-5/5 4-5/5 4-5/5 5/5 5/5 4-5/5   Left 5/5 5/5 5/5 5/5 5/5 5/5      Hip Flex Quads Hamstrings Ankle DF EHL Ankle PF   Right 5/5 5/5 5/5 5/5 5/5 5/5   Left 5/5 5/5 5/5 5/5 5/5 5/5       Ambulation without assistive device.  FWB.    + 's cart        PAST MEDICAL HISTORY   Past Medical History:   Diagnosis Date    Hypertension  Liver disease     Hepatitis C    Low back pain     Lumbar stenosis     L3-L4, L4-L5    Migraine headache     Numbness and tingling in left hand     Radiculopathy     Right L4-L5    Right leg pain     Sensation of cold in leg     Radiating into right posterior buttock and anterior thigh    Unsteady gait        Past Surgical History:   Procedure Laterality Date    HX CERVICAL FUSION  05/23/2016    ACDF C3/4 C4/5 C5/6 C6/7, Dr. Talisha Sorenson  05/25/16    hematoma removal from cervical spine   . MEDICATIONS      Current Outpatient Prescriptions   Medication Sig Dispense Refill    amLODIPine (NORVASC) 5 mg tablet Take 5 mg by mouth daily.  cyclobenzaprine (FLEXERIL) 10 mg tablet Take 1 Tab by mouth three (3) times daily as needed for Muscle Spasm(s). 90 Tab 2    oxyCODONE IR (ROXICODONE) 5 mg immediate release tablet Take 1 tab po 1-2 times a day as needed for pain  Indications: chronic pain 60 Tab 0    lisinopril (PRINIVIL, ZESTRIL) 20 mg tablet Take 20 mg by mouth daily.  lisinopril-hydrochlorothiazide (PRINZIDE, ZESTORETIC) 20-25 mg per tablet Take 1 Tab by mouth daily. 90 Tab 3        ALLERGIES    Allergies   Allergen Reactions    Cymbalta [Duloxetine] Itching    Percocet [Oxycodone-Acetaminophen] Itching          SOCIAL HISTORY    Social History     Social History    Marital status:      Spouse name: N/A    Number of children: N/A    Years of education: N/A     Occupational History    Not on file.      Social History Main Topics    Smoking status: Former Smoker     Packs/day: 0.50     Years: 40.00     Types: Cigarettes, Pipe     Quit date: 4/17/2016    Smokeless tobacco: Never Used    Alcohol use 2.4 oz/week     4 Glasses of wine per week      Comment: Daily- wine and beer    Drug use: No    Sexual activity: Yes     Partners: Female     Birth control/ protection: None     Other Topics Concern    Not on file     Social History Narrative FAMILY HISTORY    Family History   Problem Relation Age of Onset    Hypertension Mother     Heart Disease Mother     Diabetes Mother     Hypertension Father          Abhinav Herring, LEONIDAS

## 2018-06-21 ENCOUNTER — TELEPHONE (OUTPATIENT)
Dept: ORTHOPEDIC SURGERY | Age: 58
End: 2018-06-21

## 2018-06-21 DIAGNOSIS — M48.062 LUMBAR STENOSIS WITH NEUROGENIC CLAUDICATION: ICD-10-CM

## 2018-06-21 RX ORDER — OXYCODONE HYDROCHLORIDE 5 MG/1
TABLET ORAL
Qty: 60 TAB | Refills: 0 | Status: SHIPPED | OUTPATIENT
Start: 2018-07-12 | End: 2018-06-21 | Stop reason: SDUPTHER

## 2018-06-21 RX ORDER — OXYCODONE HYDROCHLORIDE 5 MG/1
TABLET ORAL
Qty: 60 TAB | Refills: 0 | Status: SHIPPED | OUTPATIENT
Start: 2018-08-11 | End: 2018-06-26

## 2018-06-21 NOTE — TELEPHONE ENCOUNTER
Patient called and is requesting to know the results of the urine test done on 6/11/18. Patient is requesting the Oxycodone medication. Will  from the [facility]. Patient tel. [phone].

## 2018-06-21 NOTE — TELEPHONE ENCOUNTER
UDS is consistent.  shows last fill 6/12/18 by Shekhar Chaudhari. [id number] for oxycodone 5 mg every day to bid prn chronic pain, #60 with dnf 7/12/18 and 8/11/18.

## 2018-06-26 DIAGNOSIS — M48.062 LUMBAR STENOSIS WITH NEUROGENIC CLAUDICATION: ICD-10-CM

## 2018-06-26 RX ORDER — OXYCODONE HYDROCHLORIDE 5 MG/1
TABLET ORAL
Qty: 60 TAB | Refills: 0 | Status: SHIPPED | OUTPATIENT
Start: 2018-07-12 | End: 2018-09-12 | Stop reason: SDUPTHER

## 2018-06-26 RX ORDER — OXYCODONE HYDROCHLORIDE 5 MG/1
TABLET ORAL
Qty: 60 TAB | Refills: 0 | Status: SHIPPED | OUTPATIENT
Start: 2018-08-11 | End: 2018-09-12 | Stop reason: SDUPTHER

## 2018-06-26 NOTE — TELEPHONE ENCOUNTER
Pt was blind transferred to my line. .stated that he was calling to make sure that his rx for oxycodone is still here up front for . I informed him that we do not have an rx here for him and that I will send a message to the nurses in regards to this. Please advise.

## 2018-08-02 ENCOUNTER — HOSPITAL ENCOUNTER (EMERGENCY)
Age: 58
Discharge: HOME | End: 2018-08-02
Attending: EMERGENCY MEDICINE | Admitting: EMERGENCY MEDICINE
Payer: MEDICARE

## 2018-08-02 VITALS
BODY MASS INDEX: 27.32 KG/M2 | DIASTOLIC BLOOD PRESSURE: 82 MMHG | TEMPERATURE: 98.1 F | WEIGHT: 170 LBS | OXYGEN SATURATION: 96 % | SYSTOLIC BLOOD PRESSURE: 143 MMHG | RESPIRATION RATE: 18 BRPM | HEIGHT: 66 IN | HEART RATE: 74 BPM
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
## 2018-08-02 PROCEDURE — 99282 EMERGENCY DEPT VISIT SF MDM: CPT

## 2018-08-02 RX ORDER — DOXYCYCLINE 100 MG/1
200 TABLET ORAL ONCE
Qty: 2 TAB | Refills: 0 | Status: SHIPPED | OUTPATIENT
Start: 2018-08-02 | End: 2018-08-02

## 2018-08-02 NOTE — ED PROVIDER NOTES
EMERGENCY DEPARTMENT HISTORY AND PHYSICAL EXAM 
 
9:24 AM 
 
Date: 8/2/2018 Patient Name: Melisa Finney. History of Presenting Illness Chief Complaint Patient presents with  Insect Bite History Provided By: Patient Chief Complaint: Insect Bite Duration: 20 Minutes Timing: This morning Location: Upper Thoracic Quality:  
Severity: Mild Modifying Factors: Pulled off intact tick and brought to ED Associated Symptoms: Denies rash, HA, or CP Additional History (Context): Melisa Dela Cruz is a 59 y.o. male with hx of HTN who presents with to ED with c/o mild upper thoracic insect bite with tick removed 20 minutes ago. Pt states he [place] lot [place] but did not have tick present on body yesterday but found tick that he brought into the ED today (removed, intact, and alive in an empty water bottle). No rash, CP, palp, ab pain. Denies hx of tick bites or DM. Pt states he is followed by Dr. Singh Torres (PCP). No other current complaints or symptoms reported. PCP: Shanthi Adams MD 
 
Current Outpatient Prescriptions Medication Sig Dispense Refill  doxycycline (ADOXA) 100 mg tablet Take 2 Tabs by mouth once for 1 dose. Indications: LYME DISEASE PREVENTION 2 Tab 0  
 [START ON 8/11/2018] oxyCODONE IR (ROXICODONE) 5 mg immediate release tablet Take 1 tab po 1-2 times a day as needed for pain DNF before start date  Indications: chronic pain 60 Tab 0  
 oxyCODONE IR (ROXICODONE) 5 mg immediate release tablet Take 1 tab po 1-2 times a day as needed for pain DNF before start date  Indications: chronic pain 60 Tab 0  
 amLODIPine (NORVASC) 5 mg tablet Take 5 mg by mouth daily.  cyclobenzaprine (FLEXERIL) 10 mg tablet Take 1 Tab by mouth three (3) times daily as needed for Muscle Spasm(s). 90 Tab 2  
 lisinopril (PRINIVIL, ZESTRIL) 20 mg tablet Take 20 mg by mouth daily.     
 lisinopril-hydrochlorothiazide (PRINZIDE, ZESTORETIC) 20-25 mg per tablet Take 1 Tab by mouth daily. 90 Tab 3 Past History Past Medical History: 
Past Medical History:  
Diagnosis Date  Hypertension  Liver disease Hepatitis C  
 Low back pain  Lumbar stenosis L3-L4, L4-L5  Migraine headache  Numbness and tingling in left hand  Radiculopathy Right L4-L5  Right leg pain  Sensation of cold in leg Radiating into right posterior buttock and anterior thigh  Unsteady gait Past Surgical History: 
Past Surgical History:  
Procedure Laterality Date  HX CERVICAL FUSION  05/23/2016 ACDF C3/4 C4/5 C5/6 C6/7, Dr. Melita Nyhan  NEUROLOGICAL PROCEDURE UNLISTED  05/25/16  
 hematoma removal from cervical spine Family History: 
Family History Problem Relation Age of Onset  Hypertension Mother  Heart Disease Mother  Diabetes Mother  Hypertension Father Social History: 
Social History Substance Use Topics  Smoking status: Former Smoker Packs/day: 0.50 Years: 40.00 Types: Cigarettes, Pipe Quit date: 4/17/2016  Smokeless tobacco: Never Used  Alcohol use 2.4 oz/week  
  4 Glasses of wine per week Comment: Daily- wine and beer Allergies: Allergies Allergen Reactions  Cymbalta [Duloxetine] Itching  Percocet [Oxycodone-Acetaminophen] Itching Review of Systems Review of Systems Constitutional: Positive for diaphoresis. Cardiovascular: Negative for chest pain. Skin: Positive for wound (tick bite upper back). Neurological: Negative for headaches. All other systems reviewed and are negative. Physical Exam  
 
Patient Vitals for the past 12 hrs: 
 Temp Pulse Resp BP SpO2  
08/02/18 0914 98.1 °F (36.7 °C) 74 18 143/82 96 % Physical Exam  
Constitutional: He is oriented to person, place, and time. He appears well-developed. HENT:  
Head: Normocephalic and atraumatic. Eyes: Conjunctivae and EOM are normal.  
Neck: Normal range of motion. Cardiovascular: Normal heart sounds. Exam reveals no gallop and no friction rub. No murmur heard. Pulmonary/Chest: Effort normal and breath sounds normal. No stridor. Abdominal: Soft. There is no tenderness. Musculoskeletal: Normal range of motion. He exhibits no tenderness. Neurological: He is alert and oriented to person, place, and time. Skin: Skin is warm and dry. No rash noted. He is not diaphoretic. Pt produced tick that is primarily with black/brown markings and not engorged. No hand/palm/foot involvement Psychiatric: He has a normal mood and affect. His behavior is normal.  
Nursing note and vitals reviewed. Diagnostic Study Results Labs - No results found for this or any previous visit (from the past 12 hour(s)). Radiologic Studies - No orders to display Medical Decision Making Provider Notes (Medical Decision Making):  
Based on my history, physical exam, and diagnostic evaluation, the patient appears to have symptoms consistent with an insect bite. The patient noted skin lesion with itching after being exposed to tick that he removed himself and brought to ED. There is no rash, annular lesion, swelling, tenderness, hand/sole/mouth/mucosal involvement. There is no evidence of surrounding cellulitis. There is no evidence of systemic complaints such as wheezing or oropharyngeal swelling. The patient's vitals have been normal.  
 
Pt likely with bite <36 hours and no symptoms currently so will defer work up for PMD. As a precaution prescribed a single doxy dose for ppx in case he would like to take it but the bite likely occurred <24 hours so doubt systemic infection. Overall, the patient is in excellent condition. There is no distress. The patient in nontoxic-appearing and appears comfortable. I discussed diagnosis  with patient and advised the patient to follow up with a doctor within 1 week for repeat evaluation.  The pt understands what signs and symptoms require immediate return to the nearest ER. The patient had the opportunity to ask any questions or voice any concerns about the care and discharge instructions. The patient seems satisfied with the care and appears to understand the discharge instructions. I am the first provider for this patient. I reviewed the vital signs, available nursing notes, past medical history, past surgical history, family history and social history. Vital Signs-Reviewed the patient's vital signs. Pulse Oximetry Analysis -  96% on room air (Interpretation) Normal 
 
Cardiac Monitor: 
Rate: 74 bpm 
Rhythm:  Normal Sinus Rhythm Records Reviewed: Nursing Notes (Time of Review: 9:31 AM) Diagnosis Clinical Impression: 1. Tick bite, initial encounter Disposition: Discharge 9:33 AM 
 
 
Follow-up Information Follow up With Details Comments Contact Info MD Rahul Call in 1 week For follow up [address] [id number] [phone] [facility] EMERGENCY DEPT Go to As needed, If symptoms worsen [address] [place] [address] [address] Discharge Medication List as of 8/2/2018  9:32 AM  
  
START taking these medications Details  
doxycycline (ADOXA) 100 mg tablet Take 2 Tabs by mouth once for 1 dose. Indications: LYME DISEASE PREVENTION, Print, Disp-2 Tab, R-0  
  
  
CONTINUE these medications which have NOT CHANGED Details  
!! oxyCODONE IR (ROXICODONE) 5 mg immediate release tablet Take 1 tab po 1-2 times a day as needed for pain DNF before start date  Indications: chronic pain, Print, Disp-60 Tab, R-0  
  
!! oxyCODONE IR (ROXICODONE) 5 mg immediate release tablet Take 1 tab po 1-2 times a day as needed for pain DNF before start date  Indications: chronic pain, Print, Disp-60 Tab, R-0  
  
amLODIPine (NORVASC) 5 mg tablet Take 5 mg by mouth daily. , Historical Med  
  
cyclobenzaprine (FLEXERIL) 10 mg tablet Take 1 Tab by mouth three (3) times daily as needed for Muscle Spasm(s). , Normal, Disp-90 Tab, R-2  
  
lisinopril (PRINIVIL, ZESTRIL) 20 mg tablet Take 20 mg by mouth daily. , Historical Med  
  
lisinopril-hydrochlorothiazide (PRINZIDE, ZESTORETIC) 20-25 mg per tablet Take 1 Tab by mouth daily. , Normal, Disp-90 Tab, R-3  
  
 !! - Potential duplicate medications found. Please discuss with provider.  
  
 
_______________________________ Attestations: 
Scribe Attestation Gonzalez Toussaint acting as a scribe for and in the presence of Mayur Duran MD     
August 02, 2018 at 9:31 AM 
    
Provider Attestation:     
I personally performed the services described in the documentation, reviewed the documentation, as recorded by the scribe in my presence, and it accurately and completely records my words and actions. August 02, 2018 at 9:31 AM - Mayur Duran MD   
_______________________________

## 2018-08-02 NOTE — DISCHARGE INSTRUCTIONS
Tick Bite: Care Instructions  Your Care Instructions    Ticks are small spiderlike animals. They bite to fasten themselves onto your skin and feed on your blood. Ticks can carry diseases. But most ticks do not carry diseases, and most tick bites do not cause serious health problems. Some people may have an allergic reaction to a tick bite. This reaction may be mild, with symptoms like itching and swelling. In rare cases, a severe allergic reaction may occur. Most of the time, all you need to do for a tick bite is relieve any symptoms you may have. Follow-up care is a key part of your treatment and safety. Be sure to make and go to all appointments, and call your doctor if you are having problems. It's also a good idea to know your test results and keep a list of the medicines you take. How can you care for yourself at home? · Put ice or a cold pack on the bite for 15 to 20 minutes once an hour. Put a thin cloth between the ice and your skin. · Try an over-the-counter medicine to relieve itching, redness, swelling, and pain. Be safe with medicines. Read and follow all instructions on the label. ¨ Take an antihistamine medicine, such as a nondrowsy one like loratadine (Claritin) or one that might make you sleepy like diphenhydramine (Benadryl). These medicines may help relieve itching, redness, and swelling. ¨ Use a spray of local anesthetic that contains benzocaine, such as Solarcaine. It may help relieve pain. If your skin reacts to the spray, stop using it. ¨ Put calamine lotion on the skin. It may help relieve itching. To avoid tick bites  · Avoid ticks:  ¨ Learn where ticks are found in your community, and stay away from those areas if possible. ¨ Cover as much of your body as possible when you work or play in grassy or wooded areas. ¨ Use insect repellents, such as products containing DEET. You can spray them on your skin.   ¨ Take steps to control ticks on your property if you live in an area where Lyme disease occurs. Clear leaves, brush, tall grasses, woodpiles, and stone fences from around your house and the edges of your yard or garden. This may help get rid of ticks. · When you come in from outdoors, check your body for ticks, including your groin, head, and underarms. The ticks may be about the size of a sesame seed. If no one else can help you check for ticks on your scalp, comb your hair with a fine-tooth comb. · If you find a tick, remove it quickly. Use tweezers to grasp the tick as close to its mouth (the part in your skin) as possible. Slowly pull the tick straight out-do not twist or yank-until its mouth releases from your skin. If part of the tick stays in the skin, leave it alone. It will likely come out on its own in a few days. · Ticks can come into your house on clothing, outdoor gear, and pets. These ticks can fall off and attach to you. ¨ Check your clothing and outdoor gear. Remove any ticks you find. Then put your clothing in a clothes dryer on high heat for 1 hour to kill any ticks that might remain. ¨ Check your pets for ticks after they have been outdoors. · When hiking in the woods, carry a small dry jar or ziplock bag. If you find a tick on your body, remove the tick and put it in the jar or bag. Store the container in the freezer so you can give it to your doctor if symptoms develop. The tick can be tested to learn whether it is carrying the bacteria that cause Lyme disease. When should you call for help? Call 911 anytime you think you may need emergency care. For example, call if:    · You have symptoms of a severe allergic reaction. These may include:  ¨ Sudden raised, red areas (hives) all over your body. ¨ Swelling of the throat, mouth, lips, or tongue. ¨ Trouble breathing. ¨ Passing out (losing consciousness).  Or you may feel very lightheaded or suddenly feel weak, confused, or restless.    Call your doctor now or seek immediate medical care if:    · You have signs of infection, such as:  ¨ Increased pain, swelling, warmth, or redness around the bite. ¨ Red streaks leading from the bite. ¨ Pus draining from the bite. ¨ A fever.    Watch closely for changes in your health, and be sure to contact your doctor if:    · You develop a new rash.     · You have joint pain.     · You are very tired.     · You have flu-like symptoms.     · You have symptoms for more than 1 week. Where can you learn more? Go to http://zulma-susana.info/. Enter X499 in the search box to learn more about \"Tick Bite: Care Instructions. \"  Current as of: November 20, 2017  Content Version: 11.7  © 8302-8444 [employer]. Care instructions adapted under license by [employer] (which disclaims liability or warranty for this information). If you have questions about a medical condition or this instruction, always ask your healthcare professional. [address] any warranty or liability for your use of this information.

## 2018-08-02 NOTE — ED TRIAGE NOTES
Patient A/O x 4, states he's here because he took a tick off of him this morning and wants to be checked for lime disease.

## 2018-09-12 ENCOUNTER — OFFICE VISIT (OUTPATIENT)
Dept: ORTHOPEDIC SURGERY | Age: 58
End: 2018-09-12

## 2018-09-12 VITALS
TEMPERATURE: 96.7 F | DIASTOLIC BLOOD PRESSURE: 98 MMHG | HEART RATE: 81 BPM | SYSTOLIC BLOOD PRESSURE: 140 MMHG | OXYGEN SATURATION: 97 %

## 2018-09-12 DIAGNOSIS — M48.062 LUMBAR STENOSIS WITH NEUROGENIC CLAUDICATION: Primary | ICD-10-CM

## 2018-09-12 RX ORDER — OXYCODONE HYDROCHLORIDE 5 MG/1
TABLET ORAL
Qty: 60 TAB | Refills: 0 | Status: SHIPPED | OUTPATIENT
Start: 2018-09-12 | End: 2018-11-05

## 2018-09-12 RX ORDER — OXYCODONE HYDROCHLORIDE 5 MG/1
TABLET ORAL
Qty: 60 TAB | Refills: 0 | Status: SHIPPED | OUTPATIENT
Start: 2018-10-10 | End: 2018-11-05

## 2018-09-12 RX ORDER — OXYCODONE HYDROCHLORIDE 5 MG/1
TABLET ORAL
Qty: 60 TAB | Refills: 0 | Status: SHIPPED | OUTPATIENT
Start: 2018-11-10 | End: 2018-12-12 | Stop reason: SDUPTHER

## 2018-09-12 RX ORDER — TOPIRAMATE 25 MG/1
TABLET ORAL
Qty: 60 TAB | Refills: 0 | Status: SHIPPED | OUTPATIENT
Start: 2018-09-12 | End: 2018-12-12 | Stop reason: SDUPTHER

## 2018-09-12 RX ORDER — NALOXONE HYDROCHLORIDE 4 MG/.1ML
SPRAY NASAL
Qty: 1 EACH | Refills: 0 | Status: SHIPPED | OUTPATIENT
Start: 2018-09-12 | End: 2018-09-12 | Stop reason: CLARIF

## 2018-09-12 RX ORDER — CYCLOBENZAPRINE HCL 10 MG
10 TABLET ORAL
Qty: 90 TAB | Refills: 2 | Status: CANCELLED | OUTPATIENT
Start: 2018-09-12

## 2018-09-12 RX ORDER — NALOXONE HYDROCHLORIDE 4 MG/.1ML
SPRAY NASAL
Qty: 1 EACH | Refills: 0 | Status: SHIPPED | OUTPATIENT
Start: 2018-09-12 | End: 2019-11-16

## 2018-09-12 NOTE — PROGRESS NOTES
[facility]ennxo N[facility] [address].  [address], [address]  [facility], [address]  Phone: [phone]  Fax: [phone]        Erick Encarnacion  : 1960  PCP: Petr Suárez MD    PROGRESS NOTE      ASSESSMENT AND PLAN    Diagnoses and all orders for this visit:    1. Lumbar stenosis with neurogenic claudication  -     oxyCODONE IR (ROXICODONE) 5 mg immediate release tablet; Take 1 tab po 1-2 times a day as needed for pain  Indications: chronic pain  -     oxyCODONE IR (ROXICODONE) 5 mg immediate release tablet; Take 1 tab po 1-2 times a day as needed for pain  Indications: chronic pain  -     oxyCODONE IR (ROXICODONE) 5 mg immediate release tablet; Take 1 tab po 1-2 times a day as needed for pain  Indications: chronic pain  -     cyclobenzaprine (FLEXERIL) 10 mg tablet; Take 1 Tab by mouth three (3) times daily as needed for Muscle Spasm(s). 1. Advised to continue HEP. 2. Safe use of opioids discussed with pt.    3. Discussed surgery as future treatment  4. Set up for L3-4 BO  5. Trial of Topamax  6. Given information on BO and deciding about surgery    Follow-up Disposition:  Return in about 3 months (around 2018) for With NP. Risks and benefits of ongoing opiate therapy have been reviewed with the patient. Per review of available records and patients , there are not signs of overuse, misuse, diversion, or concerning side effects. UDS results have been consistent. Pt has a good risk to benefit ratio which allows the pt to function in a home environment without side effects. HISTORY OF PRESENT ILLNESS  Orlando García is a 59 y.o. male. Pt presents to the office for a f/u visit for chronic pain and medication management. Pt reports good and bad days with his pain. Pt reports his leg pain is worse than his back. He states he has a short walking tolerance, he cannot walk long before needing to rest 2-3 minutes.  Pt reports pain does radiate into the posterior BLE L>R. Pt denies weakness in BLE. Pt denies saddle paresthesias. Pt states he has been using Roxicodone 5mg BID and Flexeril 10mg PRN  with relief. Pt reports usually taking Flexeril 5mg at night. Pt denies any dizziness, confusion, uncontrolled constipation, and cravings due to controlled substances. Denies persistent fevers, chills, weight changes, neurogenic bowel or bladder symptoms. Pain effects sleep, work, standing, play and recreational activities, and his ability to perform ADLs. Pt has tried:  Physical therapy:Yes   Non-opioid medications: Yes Failed Cymbalta, Gabapentin, Lyrica  spinal injections: Yes,  BO L2-3 with benefit, last 1/2018  spinal surgery: Yes. ACDF C3-7 by Dr. Vianey Li    Pt visited ED for a tick bite 8/2/18. Pt states he went to ED at [facility] 9/10/18 for crackling in his chest. Pt on disability. Opioid Assessment    Opioid Risk Tool Reviewed: YES, Score = 0  Aberrant behaviors: None. Urine Drug Screen: reviewed and up to date. UDS obtained last visit. Results reviewed and are consistent with the patient's medication use. Controlled substance agreement on file: Yes     reviewed:yes  Concomitant use of a benzodiazepine: no  MME is 15  Naloxone prescription is warranted. It has been provided or is already on file. Reports that pain would be a 10/10 w/out medication and that it goes down to a 4-5/10 after medication. This enables the pt to be functional, achieve ADLs and engage in social activities. Pain Assessment  9/12/2018   Location of Pain Back;Leg   Pain Location Comment -   Location Modifiers Right;Left   Severity of Pain 8   Quality of Pain Aching   Quality of Pain Comment -   Duration of Pain Persistent   Frequency of Pain Constant   Date Pain First Started -   Aggravating Factors Stairs; Walking;Standing;Squatting;Kneeling;Exercise;Straightening;Stretching;Bending   Aggravating Factors Comment -   Limiting Behavior -   Relieving Factors Other (Comment)   Relieving Factors Comment meds   Result of Injury No       PAST MEDICAL HISTORY   Past Medical History:   Diagnosis Date    Hypertension     Liver disease     Hepatitis C    Low back pain     Lumbar stenosis     L3-L4, L4-L5    Migraine headache     Numbness and tingling in left hand     Radiculopathy     Right L4-L5    Right leg pain     Sensation of cold in leg     Radiating into right posterior buttock and anterior thigh    Unsteady gait        Past Surgical History:   Procedure Laterality Date    HX CERVICAL FUSION  05/23/2016    ACDF C3/4 C4/5 C5/6 C6/7, Dr. Amarilis Delgado  05/25/16    hematoma removal from cervical spine   . MEDICATIONS    Current Outpatient Prescriptions   Medication Sig Dispense Refill    oxyCODONE IR (ROXICODONE) 5 mg immediate release tablet Take 1 tab po 1-2 times a day as needed for pain  DNF before start date  Indications: chronic pain 60 Tab 0    oxyCODONE IR (ROXICODONE) 5 mg immediate release tablet Take 1 tab po 1-2 times a day as needed for pain  DNF before start date  Indications: chronic pain 60 Tab 0    amLODIPine (NORVASC) 5 mg tablet Take 5 mg by mouth daily.  cyclobenzaprine (FLEXERIL) 10 mg tablet Take 1 Tab by mouth three (3) times daily as needed for Muscle Spasm(s). 90 Tab 2    lisinopril (PRINIVIL, ZESTRIL) 20 mg tablet Take 20 mg by mouth daily.  lisinopril-hydrochlorothiazide (PRINZIDE, ZESTORETIC) 20-25 mg per tablet Take 1 Tab by mouth daily. 90 Tab 3        ALLERGIES  Allergies   Allergen Reactions    Cymbalta [Duloxetine] Itching    Percocet [Oxycodone-Acetaminophen] Itching          SOCIAL HISTORY    Social History     Social History    Marital status:      Spouse name: N/A    Number of children: N/A    Years of education: N/A     Occupational History    Not on file.      Social History Main Topics    Smoking status: Former Smoker     Packs/day: 0.50     Years: 40.00 Types: Cigarettes, Pipe     Quit date: 4/17/2016    Smokeless tobacco: Never Used    Alcohol use 2.4 oz/week     4 Glasses of wine per week      Comment: Daily- wine and beer    Drug use: No    Sexual activity: Yes     Partners: Female     Birth control/ protection: None     Other Topics Concern    Not on file     Social History Narrative       FAMILY HISTORY  Family History   Problem Relation Age of Onset    Hypertension Mother     Heart Disease Mother     Diabetes Mother     Hypertension Father        REVIEW OF SYSTEMS  Review of Systems   Constitutional: Negative for chills, fever and weight loss. Respiratory: Negative for shortness of breath. Cardiovascular: Negative for chest pain. Gastrointestinal: Negative for constipation. Negative for fecal incontinence   Genitourinary: Negative for dysuria. Negative for urinary incontinence   Musculoskeletal:        Per HPI   Skin: Negative for rash. Neurological: Negative for dizziness, tingling, tremors, focal weakness and headaches. Endo/Heme/Allergies: Does not bruise/bleed easily. Psychiatric/Behavioral: The patient does not have insomnia. PHYSICAL EXAMINATION  Visit Vitals    BP (!) 140/98    Pulse 81    Temp 96.7 °F (35.9 °C) (Tympanic)    SpO2 97%         Accompanied by self. Constitutional:  Well developed, well nourished, in no acute distress. Psychiatric: Affect and mood are appropriate. Integumentary: No rashes or abrasions noted on exposed areas. Cardiovascular/Peripheral Vascular: Intact l pulses. No peripheral edema is noted. Lymphatic:  No evidence of lymphedema. No cervical lymphadenopathy. SPINE/MUSCULOSKELETAL EXAM      Lumbar spine:  No rash, ecchymosis, or gross obliquity. No fasciculations. No focal atrophy is noted. Tenderness to palpation L4-5. No tenderness to palpation at the sciatic notch. SI joints non-tender. Trochanters non tender.       Sensation grossly intact to light touch.      MOTOR:       Hip Flex  Quads Hamstrings Ankle DF EHL Ankle PF   Right +4/5 +4/5 +4/5 +4/5 +4/5 +4/5   Left +4/5 +4/5 +4/5 +4/5 +4/5 +4/5       Straight Leg raise negative. Ambulation without assistive device. FWB. RADIOGRAPHS  Lumbar spine xray films reviewed:  1) diffuse degenerative changes most pronounced at L5-S1  2) no instability    Written by Treasa Dakins, as dictated by Lily Fermin MD.    I, Dr. Lily Fermin MD, confirm that all documentation is accurate. Mr. Davonte Hartman may have a reminder for a \"due or due soon\" health maintenance. I have asked that he contact his primary care provider for follow-up on this health maintenance.

## 2018-09-12 NOTE — PROGRESS NOTES
Verbal order entered per Dr. Flori Cervantes as documented on blue sheet:  -L3/L4 BO  -Topamax 25mg 1 tab po qhs x 1 week, then increase to 1 tab po bid. Disp:60 RF:0  -Roxicodone 5mg bid for chronic pain.  Disp: 60 (3 month supply)  -Narcan Spray 4mg

## 2018-09-12 NOTE — PATIENT INSTRUCTIONS
Learning About Lumbar Epidural Steroid Injections  What is a lumbar epidural steroid injection? A doctor may give you a lumbar epidural steroid injection to try to decrease pain, tingling, or numbness in your back, buttock, or leg. These might be the result of a back or disc problem. The injection goes directly into your epidural space. This is the area in your back around your spinal cord. This injection may have both a local anesthetic and a steroid medicine. Or it may only have a steroid. Local anesthetic medicines numb your nerves right away for a short time. Steroids reduce swelling and pain. But they take a few days to start working. Some people get a series of these injections over weeks or months. How is a lumbar epidural done? The doctor may use an imaging test before or during your injection. This can be an MRI, a CT scan, or an X-ray. These tests can show where your nerve problems are. After finding the right spot, the doctor may inject a numbing medicine into the skin where you will get the steroid injection. Then he or she puts the needle for the steroid into the numbed area. You may feel some pressure. You could feel some stinging or burning during the injection. It takes about 10 to 15 minutes to get this injection. You will probably go home about 20 to 30 minutes after you get it. You will need someone to drive you home. What can you expect after a lumbar epidural?  If your injection had local anesthetic and a steroid, your legs may feel heavy or numb right after. You will probably be able to walk. But you may need to be extra careful. Take care not to lose your balance and be sure to follow your doctor's instructions. If your injection contained local anesthetic, you may feel better right away. But this pain relief will last only a few hours. Your pain will probably return. This is because the steroids have not started working yet.  Before the steroids start to work, your back may be sore for a few days. These injections don't always work. When they do, it takes 1 to 5 days. This pain relief can last for several days to a few months or longer. You may want to do less than normal for a few days. But you may also be able to return to your daily routine. Some people are dizzy or feel sick to their stomach after getting this injection. These symptoms usually do not last very long. If your pain is better, you may be able to keep doing your normal activities or physical therapy. But try not to overdo it, even if your back pain has improved a lot. If your pain is only a little better or if it comes back, your doctor may recommend another injection in a few weeks. If your pain has not changed, talk to your doctor about other treatment choices. Side effects from an epidural steroid injection include headache, fever, or infection. Serious side effects are rare. But they can include stroke, paralysis, or loss of vision. Follow-up care is a key part of your treatment and safety. Be sure to make and go to all appointments, and call your doctor if you are having problems. It's also a good idea to know your test results and keep a list of the medicines you take. Where can you learn more? Go to http://zulma-susana.info/. Enter Dannielle Sandoval in the search box to learn more about \"Learning About Lumbar Epidural Steroid Injections. \"  Current as of: November 29, 2017  Content Version: 11.7  © 3636-3769 [employer]. Care instructions adapted under license by [employer] (which disclaims liability or warranty for this information). If you have questions about a medical condition or this instruction, always ask your healthcare professional. [place] [address] any warranty or liability for your use of this information.        Deciding About Surgery for Lumbar Spinal Stenosis  Deciding About Surgery for Lumbar Spinal Stenosis  What is lumbar spinal stenosis? Lumbar spinal stenosis is the narrowing of the spinal canal in the lower back. This occurs when bone and other tissues grow inside the openings in the spinal bones. This can squeeze the nerves that branch out from the spinal cord. The squeezing can cause pain, numbness, or weakness. It happens most often in the legs, feet, or buttocks. You may choose to have surgery to ease your symptoms. Or you may try other treatments instead. These include medicines, exercise, and physical therapy. What are key points about this decision? · If your symptoms are mild to moderate, then medicine, physical therapy, and exercise may be all you need. · You may want surgery if you have tried other treatment for a while and your symptoms are still so bad that you can't do your normal activities. · Your symptoms may come back after a few years. You may need surgery again. · Surgery will most likely help leg pain. But it may not help back pain as much. Why might you choose to have surgery? · Surgery can relieve pain. It can also improve how well you can walk. · You have tried other treatment for a while and your symptoms are still so bad that you can't do your normal activities. · Without surgery, your symptoms may still bother you. If symptoms are very painful, they most often will not improve on their own. · Your doctor may advise surgery if you can't control your bladder or bowels as well as you used to. Surgery is also an option if you notice sudden changes in how well you can walk or you are more clumsy than before. Why might you choose not to have surgery? · You may try other treatments to help your symptoms. These include medicine, exercise, and physical therapy. These other treatments work best for people with mild to moderate symptoms. · Risks from surgery include nerve injury and tissue tears. And you could have chronic pain, trouble passing urine, and a spine that is not stable.   · All surgery has some risks. These include bleeding, infection, and risks from anesthesia. · You may not be able to return to all of your normal activities for many months. · Your symptoms may come back in a few years. You may need surgery again. Your decision  Thinking about the facts and your feelings can help you make a decision that is right for you. Be sure you understand the benefits and risks of your options, and think about what else you need to do before you make the decision. Where can you learn more? Go to http://zulma-susana.info/. Enter I264 in the search box to learn more about \"Deciding About Surgery for Lumbar Spinal Stenosis. \"  Current as of: November 29, 2017  Content Version: 11.7  © 5100-8969 [employer]. Care instructions adapted under license by [employer] (which disclaims liability or warranty for this information). If you have questions about a medical condition or this instruction, always ask your healthcare professional. [place] [address] any warranty or liability for your use of this information.

## 2018-09-12 NOTE — MR AVS SNAPSHOT
[address] [place] [address] [address] [place] [address] 
[phone] Patient: Patt Berry MRN: [id number] Visit Information Date & Time Provider Department Dept. Phone Encounter #  
 9/12/2018  8:40 AM Cristina Foster MD [facility] [phone] [id number] Follow-up Instructions Return in about 3 months (around 12/12/2018) for With NP. Upcoming Health Maintenance Date Due DTaP/Tdap/Td series (1 - Tdap) 7/27/1979 FOBT Q 1 YEAR AGE 50-75 7/27/2008 ZOSTER VACCINE AGE 60> 5/27/2018 MEDICARE YEARLY EXAM 6/11/2018 Influenza Age 5 to Adult 8/1/2018 Allergies as of 9/12/2018  Review Complete On: 9/12/2018 By: Melisa Westbrook Severity Noted Reaction Type Reactions Cymbalta [Duloxetine]  03/05/2018    Itching Percocet [Oxycodone-acetaminophen]  07/05/2016    Itching Current Immunizations  Reviewed on 5/29/2016 Name Date Influenza Vaccine (Quad) PF 2/3/2016 Not reviewed this visit You Were Diagnosed With   
  
 Codes Comments Lumbar stenosis with neurogenic claudication    -  Primary ICD-10-CM: R90.570 
ICD-9-CM: 724.03 Vitals BP Pulse Temp SpO2 Smoking Status (!) 140/98 81 96.7 °F (35.9 °C) (Tympanic) 97% Former Smoker Preferred Pharmacy Pharmacy Name Phone [facility] [address] [place] [address]. [phone] Your Updated Medication List  
  
   
This list is accurate as of 9/12/18  9:58 AM.  Always use your most recent med list.  
  
  
  
  
 cyclobenzaprine 10 mg tablet Commonly known as:  FLEXERIL Take 1 Tab by mouth three (3) times daily as needed for Muscle Spasm(s). lisinopril 20 mg tablet Commonly known as:  Brant Bridges Take 20 mg by mouth daily. lisinopril-hydroCHLOROthiazide 20-25 mg per tablet Commonly known as:  Jose Carlos Oconnor  
 Take 1 Tab by mouth daily. naloxone 4 mg/actuation nasal spray Commonly known as:  [employer] Use 1 spray intranasally, then discard. Repeat with new spray every 2 min as needed for opioid overdose symptoms, alternating nostrils. NORVASC 5 mg tablet Generic drug:  amLODIPine Take 5 mg by mouth daily. * oxyCODONE IR 5 mg immediate release tablet Commonly known as:  Naomi Rodriguez Take 1 tab po 1-2 times a day as needed for pain  Indications: chronic pain * oxyCODONE IR 5 mg immediate release tablet Commonly known as:  Naomi Rodriguez Take 1 tab po 1-2 times a day as needed for pain  Indications: chronic pain Start taking on:  10/10/2018 * oxyCODONE IR 5 mg immediate release tablet Commonly known as:  Naomi Rodriguez Take 1 tab po 1-2 times a day as needed for pain  Indications: chronic pain Start taking on:  11/10/2018  
  
 topiramate 25 mg tablet Commonly known as:  TOPAMAX  
1 tab po qhs for one week then 1 tab po bid thereafter. * Notice: This list has 3 medication(s) that are the same as other medications prescribed for you. Read the directions carefully, and ask your doctor or other care provider to review them with you. Prescriptions Printed Refills  
 oxyCODONE IR (ROXICODONE) 5 mg immediate release tablet 0 Starting on: 11/10/2018 Sig: Take 1 tab po 1-2 times a day as needed for pain  Indications: chronic pain  
 Class: Print  
 oxyCODONE IR (ROXICODONE) 5 mg immediate release tablet 0 Starting on: 10/10/2018 Sig: Take 1 tab po 1-2 times a day as needed for pain  Indications: chronic pain  
 Class: Print  
 oxyCODONE IR (ROXICODONE) 5 mg immediate release tablet 0 Sig: Take 1 tab po 1-2 times a day as needed for pain  Indications: chronic pain  
 Class: Print  
 naloxone (NARCAN) 4 mg/actuation nasal spray 0 Sig: Use 1 spray intranasally, then discard.  Repeat with new spray every 2 min as needed for opioid overdose symptoms, alternating nostrils. Class: Print Prescriptions Sent to Pharmacy Refills  
 topiramate (TOPAMAX) 25 mg tablet 0 Si tab po qhs for one week then 1 tab po bid thereafter. Class: Normal  
 Pharmacy: [facility] [id number]  #: [phone] We Performed the Following AMB POC XRAY, SPINE, LUMBOSACRAL; 4+ [id number] CPT(R)] Follow-up Instructions Return in about 3 months (around 2018) for With NP. Patient Instructions Learning About Lumbar Epidural Steroid Injections What is a lumbar epidural steroid injection? A doctor may give you a lumbar epidural steroid injection to try to decrease pain, tingling, or numbness in your back, buttock, or leg. These might be the result of a back or disc problem. The injection goes directly into your epidural space. This is the area in your back around your spinal cord. This injection may have both a local anesthetic and a steroid medicine. Or it may only have a steroid. Local anesthetic medicines numb your nerves right away for a short time. Steroids reduce swelling and pain. But they take a few days to start working. Some people get a series of these injections over weeks or months. How is a lumbar epidural done? The doctor may use an imaging test before or during your injection. This can be an MRI, a CT scan, or an X-ray. These tests can show where your nerve problems are. After finding the right spot, the doctor may inject a numbing medicine into the skin where you will get the steroid injection. Then he or she puts the needle for the steroid into the numbed area. You may feel some pressure. You could feel some stinging or burning during the injection. It takes about 10 to 15 minutes to get this injection. You will probably go home about 20 to 30 minutes after you get it. You will need someone to drive you home. What can you expect after a lumbar epidural? 
If your injection had local anesthetic and a steroid, your legs may feel heavy or numb right after. You will probably be able to walk. But you may need to be extra careful. Take care not to lose your balance and be sure to follow your doctor's instructions. If your injection contained local anesthetic, you may feel better right away. But this pain relief will last only a few hours. Your pain will probably return. This is because the steroids have not started working yet. Before the steroids start to work, your back may be sore for a few days. These injections don't always work. When they do, it takes 1 to 5 days. This pain relief can last for several days to a few months or longer. You may want to do less than normal for a few days. But you may also be able to return to your daily routine. Some people are dizzy or feel sick to their stomach after getting this injection. These symptoms usually do not last very long. If your pain is better, you may be able to keep doing your normal activities or physical therapy. But try not to overdo it, even if your back pain has improved a lot. If your pain is only a little better or if it comes back, your doctor may recommend another injection in a few weeks. If your pain has not changed, talk to your doctor about other treatment choices. Side effects from an epidural steroid injection include headache, fever, or infection. Serious side effects are rare. But they can include stroke, paralysis, or loss of vision. Follow-up care is a key part of your treatment and safety. Be sure to make and go to all appointments, and call your doctor if you are having problems. It's also a good idea to know your test results and keep a list of the medicines you take. Where can you learn more? Go to http://zulma-susana.info/.  
Enter Crystal Acosta in the search box to learn more about \"Learning About Lumbar Epidural Steroid Injections. \" Current as of: November 29, 2017 Content Version: 11.7 © 8304-9193 [employer]. Care instructions adapted under license by [employer] (which disclaims liability or warranty for this information). If you have questions about a medical condition or this instruction, always ask your healthcare professional. [address] any warranty or liability for your use of this information. Deciding About Surgery for Lumbar Spinal Stenosis Deciding About Surgery for Lumbar Spinal Stenosis What is lumbar spinal stenosis? Lumbar spinal stenosis is the narrowing of the spinal canal in the lower back. This occurs when bone and other tissues grow inside the openings in the spinal bones. This can squeeze the nerves that branch out from the spinal cord. The squeezing can cause pain, numbness, or weakness. It happens most often in the legs, feet, or buttocks. You may choose to have surgery to ease your symptoms. Or you may try other treatments instead. These include medicines, exercise, and physical therapy. What are key points about this decision? · If your symptoms are mild to moderate, then medicine, physical therapy, and exercise may be all you need. · You may want surgery if you have tried other treatment for a while and your symptoms are still so bad that you can't do your normal activities. · Your symptoms may come back after a few years. You may need surgery again. · Surgery will most likely help leg pain. But it may not help back pain as much. Why might you choose to have surgery? · Surgery can relieve pain. It can also improve how well you can walk. · You have tried other treatment for a while and your symptoms are still so bad that you can't do your normal activities. · Without surgery, your symptoms may still bother you. If symptoms are very painful, they most often will not improve on their own. · Your doctor may advise surgery if you can't control your bladder or bowels as well as you used to. Surgery is also an option if you notice sudden changes in how well you can walk or you are more clumsy than before. Why might you choose not to have surgery? · You may try other treatments to help your symptoms. These include medicine, exercise, and physical therapy. These other treatments work best for people with mild to moderate symptoms. · Risks from surgery include nerve injury and tissue tears. And you could have chronic pain, trouble passing urine, and a spine that is not stable. · All surgery has some risks. These include bleeding, infection, and risks from anesthesia. · You may not be able to return to all of your normal activities for many months. · Your symptoms may come back in a few years. You may need surgery again. Your decision Thinking about the facts and your feelings can help you make a decision that is right for you. Be sure you understand the benefits and risks of your options, and think about what else you need to do before you make the decision. Where can you learn more? Go to http://zulma-susana.info/. Enter Z181 in the search box to learn more about \"Deciding About Surgery for Lumbar Spinal Stenosis. \" Current as of: November 29, 2017 Content Version: 11.7 © 6377-8635 [employer]. Care instructions adapted under license by [employer] (which disclaims liability or warranty for this information). If you have questions about a medical condition or this instruction, always ask your healthcare professional. [place] [address] any warranty or liability for your use of this information. Introducing [facility]! Nemo Hendricks introduces [employer] patient portal. Now you can access parts of your medical record, email your doctor's office, and request medication refills online.    
 
1. In your internet browser, go to [email or web address] 2. Click on the First Time User? Click Here link in the Sign In box. You will see the New Member Sign Up page. 3. Enter your [employer] Access Code exactly as it appears below. You will not need to use this code after youve completed the sign-up process. If you do not sign up before the expiration date, you must request a new code. · [employer] Access Code: [email or web address] Expires: 11/19/2018  5:20 AM 
 
4. Enter the last four digits of your Social Security Number (xxxx) and Date of Birth (mm/dd/yyyy) as indicated and click Submit. You will be taken to the next sign-up page. 5. Create a [employer] ID. This will be your [employer] login ID and cannot be changed, so think of one that is secure and easy to remember. 6. Create a [employer] password. You can change your password at any time. 7. Enter your Password Reset Question and Answer. This can be used at a later time if you forget your password. 8. Enter your e-mail address. You will receive e-mail notification when new information is available in [address] 9. Click Sign Up. You can now view and download portions of your medical record. 10. Click the Download Summary menu link to download a portable copy of your medical information. If you have questions, please visit the Frequently Asked Questions section of the [employer] website. Remember, [employer] is NOT to be used for urgent needs. For medical emergencies, dial 911. Now available from your iPhone and Android! Please provide this summary of care documentation to your next provider. Your primary care clinician is listed as Rahul. If you have any questions after today's visit, please call [phone].

## 2018-11-08 PROBLEM — K62.5 RECTAL BLEEDING: Status: ACTIVE | Noted: 2018-11-08

## 2018-11-23 ENCOUNTER — HOSPITAL ENCOUNTER (EMERGENCY)
Age: 58
Discharge: HOME | End: 2018-11-23
Attending: EMERGENCY MEDICINE | Admitting: EMERGENCY MEDICINE
Payer: MEDICARE

## 2018-11-23 ENCOUNTER — APPOINTMENT (OUTPATIENT)
Dept: GENERAL RADIOLOGY | Age: 58
End: 2018-11-23
Attending: PHYSICIAN ASSISTANT
Payer: MEDICARE

## 2018-11-23 VITALS
OXYGEN SATURATION: 100 % | HEART RATE: 89 BPM | TEMPERATURE: 98.5 F | BODY MASS INDEX: 27.32 KG/M2 | HEIGHT: 66 IN | SYSTOLIC BLOOD PRESSURE: 126 MMHG | WEIGHT: 170 LBS | DIASTOLIC BLOOD PRESSURE: 76 MMHG | RESPIRATION RATE: 18 BRPM

## 2018-11-23 DIAGNOSIS — M48.062 LUMBAR STENOSIS WITH NEUROGENIC CLAUDICATION: ICD-10-CM

## 2018-11-23 DIAGNOSIS — J02.9 SORE THROAT: Primary | ICD-10-CM

## 2018-11-23 DIAGNOSIS — M25.561 ACUTE PAIN OF RIGHT KNEE: ICD-10-CM

## 2018-11-23 PROCEDURE — 99282 EMERGENCY DEPT VISIT SF MDM: CPT

## 2018-11-23 PROCEDURE — 70360 X-RAY EXAM OF NECK: CPT

## 2018-11-23 PROCEDURE — 73562 X-RAY EXAM OF KNEE 3: CPT

## 2018-11-23 RX ORDER — LIDOCAINE HYDROCHLORIDE 20 MG/ML
SOLUTION OROPHARYNGEAL
Qty: 200 ML | Refills: 0 | Status: ON HOLD | OUTPATIENT
Start: 2018-11-23 | End: 2019-03-26

## 2018-11-23 NOTE — ED PROVIDER NOTES
EMERGENCY DEPARTMENT HISTORY AND PHYSICAL EXAM 
 
5:16 PM 
 
 
Date: 11/23/2018 Patient Name: Belle Velazquez. History of Presenting Illness Chief Complaint Patient presents with  Sore Throat  Knee Pain History Provided By: Patient Chief Complaint: R knee pain Duration:  Days Timing:  Acute Location: R knee Quality: Throbbing Severity: Mild Modifying Factors: Pain improved with Percocet Associated Symptoms: Numbness, tingling Additional History (Context): Belle Velazquez. is a 59 y.o. male with a PMHx of HTN and sciatica who presents with acute, mild throbbing R knee pain onset a few days ago with associated numbness and tingling. He denies any injury or trauma. States he takes Percocet from his chronic pain for relief. Pain is worsened with movement. States he had surgery for his sciatica to prevent the nerve pain in his legs and sees Dr. Sathya Easley for pain management. He will be going in on Tuesday for a shot in his back. Patient also c/o throat irritation persisting for about 3 weeks. Reports that he ate a chicken bone that felt like it was stuck. Patient denies any nausea, vomiting, fever, or chills. No other symptoms or concerns were expressed. PCP: Pennie Acosta MD  
 
 
Current Outpatient Medications Medication Sig Dispense Refill  lidocaine (LIDOCAINE VISCOUS) 2 % solution Put 10 mL in mouth and swish and spit out QAC and at bedtime 200 mL 0  
 lisinopril (PRINIVIL, ZESTRIL) 20 mg tablet Take 20 mg by mouth nightly.  oxyCODONE IR (ROXICODONE) 5 mg immediate release tablet Take 1 tab po 1-2 times a day as needed for pain  Indications: chronic pain 60 Tab 0  
 topiramate (TOPAMAX) 25 mg tablet 1 tab po qhs for one week then 1 tab po bid thereafter. 60 Tab 0  
 naloxone (NARCAN) 4 mg/actuation nasal spray Use 1 spray intranasally, then discard.  Repeat with new spray every 2 min as needed for opioid overdose symptoms, alternating nostrils. 1 Each 0  
 amLODIPine (NORVASC) 5 mg tablet Take 5 mg by mouth daily.  cyclobenzaprine (FLEXERIL) 10 mg tablet Take 1 Tab by mouth three (3) times daily as needed for Muscle Spasm(s). 90 Tab 2  
 lisinopril-hydrochlorothiazide (PRINZIDE, ZESTORETIC) 20-25 mg per tablet Take 1 Tab by mouth daily. 90 Tab 3 Past History Past Medical History: 
Past Medical History:  
Diagnosis Date  Hypertension  Liver disease Hepatitis C  
 Low back pain  Lumbar stenosis L3-L4, L4-L5  Migraine headache  Numbness and tingling in left hand  Radiculopathy Right L4-L5  Right leg pain  Sensation of cold in leg Radiating into right posterior buttock and anterior thigh  Unsteady gait Past Surgical History: 
Past Surgical History:  
Procedure Laterality Date  HX CERVICAL FUSION  2016 ACDF C3/4 C4/5 C5/6 C6/7, Dr. Oscar Small  NEUROLOGICAL PROCEDURE UNLISTED  16  
 hematoma removal from cervical spine Family History: 
Family History Problem Relation Age of Onset  Hypertension Mother  Heart Disease Mother  Diabetes Mother  Hypertension Father Social History: 
Social History Tobacco Use  Smoking status: Former Smoker Packs/day: 0.50 Years: 40.00 Pack years: 20.00 Types: Cigarettes, Pipe Last attempt to quit: 2016 Years since quittin.6  Smokeless tobacco: Never Used Substance Use Topics  Alcohol use: Yes Alcohol/week: 2.4 oz Types: 4 Glasses of wine per week Comment: Daily- wine and beer  Drug use: No  
 
 
Allergies: Allergies Allergen Reactions  Cymbalta [Duloxetine] Itching  Percocet [Oxycodone-Acetaminophen] Itching Review of Systems Review of Systems Constitutional: Negative for chills and fever. HENT: Negative for trouble swallowing. Positive for throat irritation. Respiratory: Negative for shortness of breath. Cardiovascular: Negative for chest pain. Gastrointestinal: Negative for abdominal pain, nausea and vomiting. Musculoskeletal: Positive for arthralgias (R knee). Skin: Negative for rash. Neurological: Positive for numbness (R knee). Negative for weakness. Positive for R knee paresthesia. All other systems reviewed and are negative. Physical Exam  
 
Visit Vitals /76 (BP 1 Location: Left arm, BP Patient Position: At rest) Pulse 89 Temp 98.5 °F (36.9 °C) Resp 18 Ht 5' 6\" (1.676 m) Wt 77.1 kg (170 lb) SpO2 100% BMI 27.44 kg/m² Physical Exam  
Constitutional: He appears well-developed and well-nourished. No distress. Looks well. HENT:  
Head: Normocephalic and atraumatic. Mouth/Throat: Uvula is midline. No oral lesions. No trismus in the jaw. No dental abscesses or uvula swelling. No oropharyngeal exudate, posterior oropharyngeal edema, posterior oropharyngeal erythema or tonsillar abscesses. . 
No erythema. No edema. Neck: Normal range of motion. Neck supple. Cardiovascular: Normal rate, regular rhythm, normal heart sounds and intact distal pulses. Exam reveals no gallop and no friction rub. No murmur heard. Pulmonary/Chest: Effort normal and breath sounds normal. No stridor. No respiratory distress. He has no wheezes. He has no rales. Musculoskeletal: Normal range of motion. Right knee: He exhibits normal range of motion, no erythema and no bony tenderness. No edema. No discoloration. No calf tenderness. Lymphadenopathy:  
  He has no cervical adenopathy. Neurological: He is alert. Skin: Skin is warm. No rash noted. He is not diaphoretic. Nursing note and vitals reviewed. Diagnostic Study Results Labs - No results found for this or any previous visit (from the past 12 hour(s)). Radiologic Studies -  
XR KNEE RT 3 V    (Results Pending) XR NECK SOFT TISSUE    (Results Pending) XR KNEE and XR NECK: 
(Interpretation by ED Physician): 
No acute processes. Medical Decision Making I am the first provider for this patient. I reviewed the vital signs, available nursing notes, past medical history, past surgical history, family history and social history. Vital Signs-Reviewed the patient's vital signs. Pulse Oximetry Analysis -  100% on room air, WNL Cardiac Monitor: 
Rate: 89 bpm 
Rhythm:  Normal Sinus Rhythm Records Reviewed: Nursing Notes (Time of Review: 5:16 PM) Provider Notes (Medical Decision Making): 
Patient is a 60 y/o male who presents due to throat pain x weeks after eating a chicken bone. No evidence of abscess, strep pharyngitis, or foreign body. Afebrile, VS stable, no distress. Will refer to GI for outpatient follow-up and possible endoscopy. Also presents with non-traumatic knee pain. No swelling, erythema, or deformity. No evidence of septic joint or fracture. Currently in pain management. Stable for d/c  
 
 
Diagnosis Clinical Impression: 1. Sore throat 2. Lumbar stenosis with neurogenic claudication 3. Acute pain of right knee Disposition: Discharge Follow-up Information Follow up With Specialties Details Why Contact Info [facility] EMERGENCY DEPT Emergency Medicine  If symptoms worsen [address] [address] 
[phone] Elías Marcos MD Internal Medicine In 2 days  [address] [facility] [address] [place] [address] 
[phone] Maria De Jesus Tavarez MD Gastroenterology Schedule an appointment as soon as possible for a visit  [address] [facility] [address] [facility] [address] 
[phone] [address] [place] [address], [address] [facility] - [facility] Orthopedic Surgery Schedule an appointment as soon as possible for a visit  [address] [facility] [address] [place] [id number] [phone] Medication List  
  
START taking these medications   
lidocaine 2 % solution Commonly known as:  LIDOCAINE VISCOUS Put 10 mL in mouth and swish and spit out QAC and at bedtime ASK your doctor about these medications   
cyclobenzaprine 10 mg tablet Commonly known as:  FLEXERIL Take 1 Tab by mouth three (3) times daily as needed for Muscle Spasm(s). lisinopril 20 mg tablet Commonly known as:  PRINIVIL, ZESTRIL 
  
lisinopril-hydroCHLOROthiazide 20-25 mg per tablet Commonly known as:  Aretta Rist Take 1 Tab by mouth daily. naloxone 4 mg/actuation nasal spray Commonly known as:  [employer] Use 1 spray intranasally, then discard. Repeat with new spray every 2 min as needed for opioid overdose symptoms, alternating nostrils. NORVASC 5 mg tablet Generic drug:  amLODIPine 
  
oxyCODONE IR 5 mg immediate release tablet Commonly known as:  [place] Fines Take 1 tab po 1-2 times a day as needed for pain  Indications: chronic pain 
  
topiramate 25 mg tablet Commonly known as:  TOPAMAX 
1 tab po qhs for one week then 1 tab po bid thereafter. Where to Get Your Medications Information about where to get these medications is not yet available Ask your nurse or doctor about these medications · lidocaine 2 % solution 
  
 
_______________________________ Scribe Attestation Nikki Ballard acting as a scribe for and in the presence of Mayuri Fry, [address] November 23, 2018 at 5:34 PM 
    
Provider Attestation:     
I personally performed the services described in the documentation, reviewed the documentation, as recorded by the scribe in my presence, and it accurately and completely records my words and actions. November 23, 2018 at 5:34 PM - MENDOZA Garcia 
 
_______________________________

## 2018-11-23 NOTE — DISCHARGE INSTRUCTIONS
Joint Pain: Care Instructions  Your Care Instructions    Many people have small aches and pains from overuse or injury to muscles and joints. Joint injuries often happen during sports or recreation, work tasks, or projects around the home. An overuse injury can happen when you put too much stress on a joint or when you do an activity that stresses the joint over and over, such as using the computer or rowing a boat. You can take action at home to help your muscles and joints get better. You should feel better in 1 to 2 weeks, but it can take 3 months or more to heal completely. Follow-up care is a key part of your treatment and safety. Be sure to make and go to all appointments, and call your doctor if you are having problems. It's also a good idea to know your test results and keep a list of the medicines you take. How can you care for yourself at home? · Do not put weight on the injured joint for at least a day or two. · For the first day or two after an injury, do not take hot showers or baths, and do not use hot packs. The heat could make swelling worse. · Put ice or a cold pack on the sore joint for 10 to 20 minutes at a time. Try to do this every 1 to 2 hours for the next 3 days (when you are awake) or until the swelling goes down. Put a thin cloth between the ice and your skin. · Wrap the injury in an elastic bandage. Do not wrap it too tightly because this can cause more swelling. · Prop up the sore joint on a pillow when you ice it or anytime you sit or lie down during the next 3 days. Try to keep it above the level of your heart. This will help reduce swelling. · Take an over-the-counter pain medicine, such as acetaminophen (Tylenol), ibuprofen (Advil, Motrin), or naproxen (Aleve). Read and follow all instructions on the label. · After 1 or 2 days of rest, begin moving the joint gently.  While the joint is still healing, you can begin to exercise using activities that do not strain or hurt the painful joint. When should you call for help? Call your doctor now or seek immediate medical care if:    · You have signs of infection, such as:  ? Increased pain, swelling, warmth, and redness. ? Red streaks leading from the joint. ? A fever.    Watch closely for changes in your health, and be sure to contact your doctor if:    · Your movement or symptoms are not getting better after 1 to 2 weeks of home treatment. Where can you learn more? Go to http://zulma-susana.info/. Enter P205 in the search box to learn more about \"Joint Pain: Care Instructions. \"  Current as of: November 29, 2017  Content Version: 11.8  © 7618-1074 [employer]. Care instructions adapted under license by [employer] (which disclaims liability or warranty for this information). If you have questions about a medical condition or this instruction, always ask your healthcare professional. [place] [address] any warranty or liability for your use of this information. Sore Throat: Care Instructions  Your Care Instructions    Infection by bacteria or a virus causes most sore throats. Cigarette smoke, dry air, air pollution, allergies, and yelling can also cause a sore throat. Sore throats can be painful and annoying. Fortunately, most sore throats go away on their own. If you have a bacterial infection, your doctor may prescribe antibiotics. Follow-up care is a key part of your treatment and safety. Be sure to make and go to all appointments, and call your doctor if you are having problems. It's also a good idea to know your test results and keep a list of the medicines you take. How can you care for yourself at home? · If your doctor prescribed antibiotics, take them as directed. Do not stop taking them just because you feel better. You need to take the full course of antibiotics.   · Gargle with warm salt water once an hour to help reduce swelling and relieve discomfort. Use 1 teaspoon of salt mixed in 1 cup of warm water. · Take an over-the-counter pain medicine, such as acetaminophen (Tylenol), ibuprofen (Advil, Motrin), or naproxen (Aleve). Read and follow all instructions on the label. · Be careful when taking over-the-counter cold or flu medicines and Tylenol at the same time. Many of these medicines have acetaminophen, which is Tylenol. Read the labels to make sure that you are not taking more than the recommended dose. Too much acetaminophen (Tylenol) can be harmful. · Drink plenty of fluids. Fluids may help soothe an irritated throat. Hot fluids, such as tea or soup, may help decrease throat pain. · Use over-the-counter throat lozenges to soothe pain. Regular cough drops or hard candy may also help. These should not be given to young children because of the risk of choking. · Do not smoke or allow others to smoke around you. If you need help quitting, talk to your doctor about stop-smoking programs and medicines. These can increase your chances of quitting for good. · Use a vaporizer or humidifier to add moisture to your bedroom. Follow the directions for cleaning the machine. When should you call for help? Call your doctor now or seek immediate medical care if:    · You have new or worse trouble swallowing.     · Your sore throat gets much worse on one side.    Watch closely for changes in your health, and be sure to contact your doctor if you do not get better as expected. Where can you learn more? Go to http://zulma-susana.info/. Enter 062 [id number] 80 [id number] in the search box to learn more about \"Sore Throat: Care Instructions. \"  Current as of: March 28, 2018  Content Version: 11.8  © 5693-0174 [employer]. Care instructions adapted under license by [employer] (which disclaims liability or warranty for this information).  If you have questions about a medical condition or this instruction, always ask your healthcare professional. [address] any warranty or liability for your use of this information.

## 2018-11-23 NOTE — ED TRIAGE NOTES
Patient presents to the ED for evaluation of right knee pain x3 days and sore throat. Patient states, \"I ate some fishh a few weeks ago and now it feels like I have something stuck in the back of my throat. \"

## 2018-11-27 ENCOUNTER — APPOINTMENT (OUTPATIENT)
Dept: GENERAL RADIOLOGY | Age: 58
End: 2018-11-27
Attending: PHYSICAL MEDICINE & REHABILITATION
Payer: MEDICARE

## 2018-11-27 ENCOUNTER — HOSPITAL ENCOUNTER (OUTPATIENT)
Age: 58
Setting detail: OUTPATIENT SURGERY
Discharge: HOME | End: 2018-11-27
Attending: PHYSICAL MEDICINE & REHABILITATION | Admitting: PHYSICAL MEDICINE & REHABILITATION
Payer: MEDICARE

## 2018-11-27 VITALS
DIASTOLIC BLOOD PRESSURE: 83 MMHG | BODY MASS INDEX: 27.32 KG/M2 | OXYGEN SATURATION: 100 % | SYSTOLIC BLOOD PRESSURE: 132 MMHG | WEIGHT: 170 LBS | HEIGHT: 66 IN | TEMPERATURE: 98.6 F | RESPIRATION RATE: 16 BRPM | HEART RATE: 85 BPM

## 2018-11-27 PROCEDURE — 74011250636 HC RX REV CODE- 250/636: Performed by: PHYSICAL MEDICINE & REHABILITATION

## 2018-11-27 PROCEDURE — 77030014124 HC TY EPDRL BBMI -A: Performed by: PHYSICAL MEDICINE & REHABILITATION

## 2018-11-27 PROCEDURE — 74011250636 HC RX REV CODE- 250/636

## 2018-11-27 PROCEDURE — 74011250637 HC RX REV CODE- 250/637: Performed by: PHYSICAL MEDICINE & REHABILITATION

## 2018-11-27 PROCEDURE — 74011636320 HC RX REV CODE- 636/320

## 2018-11-27 PROCEDURE — 74011636320 HC RX REV CODE- 636/320: Performed by: PHYSICAL MEDICINE & REHABILITATION

## 2018-11-27 PROCEDURE — 77030003543 HC NDL EPDRL BBMI -A: Performed by: PHYSICAL MEDICINE & REHABILITATION

## 2018-11-27 PROCEDURE — 76010000009 HC PAIN MGT 0 TO 30 MIN PROC: Performed by: PHYSICAL MEDICINE & REHABILITATION

## 2018-11-27 RX ORDER — DEXAMETHASONE SODIUM PHOSPHATE 100 MG/10ML
INJECTION INTRAMUSCULAR; INTRAVENOUS AS NEEDED
Status: DISCONTINUED | OUTPATIENT
Start: 2018-11-27 | End: 2018-11-27 | Stop reason: HOSPADM

## 2018-11-27 RX ORDER — DIAZEPAM 5 MG/1
5-20 TABLET ORAL ONCE
Status: COMPLETED | OUTPATIENT
Start: 2018-11-27 | End: 2018-11-27

## 2018-11-27 RX ORDER — LIDOCAINE HYDROCHLORIDE 10 MG/ML
INJECTION, SOLUTION EPIDURAL; INFILTRATION; INTRACAUDAL; PERINEURAL AS NEEDED
Status: DISCONTINUED | OUTPATIENT
Start: 2018-11-27 | End: 2018-11-27 | Stop reason: HOSPADM

## 2018-11-27 RX ADMIN — DIAZEPAM 5 MG: 5 TABLET ORAL at 12:52

## 2018-11-27 NOTE — PROCEDURES
Intralaminar Epidural Steroid Procedure Note        Patient Name   Vince Montana. Date of Procedure: November 27, 2018  Preoperative Diagnosis: Lumbar spinal stenosis  Postoperative Diagnosis: Same  Location [facility], [address]      Procedure:  Epidural Steroid Injection    Consent:  Informed consent was obtained prior to the procedure. In addition to the potential risks associated with the procedure itself, the patient was informed both verbally and in writing of the potential side effects of the use of glucocorticoid. The patient appeared to comprehend the informed consent and desired to have the procedure performed. Procedure in Detail:  The patient was taken to the procedure suite and placed in the prone position on the operating table on appropriate padding. The posterior lumbar region was prepped and draped in the usual sterile fashion. Intraoperative fluoroscopy was used to localize the L3-L4 interspace. The skin was infiltrated with 1% lidocaine. An 18-gauge Tuohy needle was advanced into the epidural space at L3-L4 under fluoroscopic guidance using the loss of resistance technique. No cerebrospinal fluid was seen throughout the procedure. Yes  A small amount of Isovue was injected into the epidural space, confirming appropriated needle placement on fluoroscopy. No vascular uptake was identified. Next, 2ml of 1% Lidocaine and 30mg of preservative free Dexamethasone were injected via the Tuohy needle. The needle was removed from the patient. The patient tolerated the procedure well and was discharged home with designated  and care instructions.       Signed By: Stacy Parmar MD                        November 27, 2018

## 2018-11-27 NOTE — INTERVAL H&P NOTE
H&P Update:  Marietta Daygala was seen and briefly examined. Recent ED visit for BRBPR post colonoscopy. Felt to be stable, nl HH and coags. History and physical has been reviewed. The patient has been examined. There have been no significant clinical changes since the completion of the originally dated History and Physical other than noted above.     Signed By: Janell Day MD     November 27, 2018 1:00 PM

## 2018-11-27 NOTE — H&P
Office Visit     2018  [facility]   Janay Sullivan MD   Physical Medicine and Rehab   Lumbar stenosis with neurogenic claudication   Dx   Back Pain , Leg Pain     Reason for Visit    Progress Notes               [facility]  [address] [facility] [address]  [place], [facility] [address]  Phone: [phone]  Fax: [phone]           Render Revering  : 1960  PCP: Den Bains MD     PROGRESS NOTE        ASSESSMENT AND PLAN     Diagnoses and all orders for this visit:     1. Lumbar stenosis with neurogenic claudication  -     oxyCODONE IR (ROXICODONE) 5 mg immediate release tablet; Take 1 tab po 1-2 times a day as needed for pain  Indications: chronic pain  -     oxyCODONE IR (ROXICODONE) 5 mg immediate release tablet; Take 1 tab po 1-2 times a day as needed for pain  Indications: chronic pain  -     oxyCODONE IR (ROXICODONE) 5 mg immediate release tablet; Take 1 tab po 1-2 times a day as needed for pain  Indications: chronic pain  -     cyclobenzaprine (FLEXERIL) 10 mg tablet; Take 1 Tab by mouth three (3) times daily as needed for Muscle Spasm(s).        1. Advised to continue HEP. 2. Safe use of opioids discussed with pt.    3. Discussed surgery as future treatment  4. Set up for L3-4 BO  5. Trial of Topamax  6. Given information on BO and deciding about surgery     Follow-up Disposition:  Return in about 3 months (around 2018) for With NP.     Risks and benefits of ongoing opiate therapy have been reviewed with the patient. Per review of available records and patients , there are not signs of overuse, misuse, diversion, or concerning side effects. UDS results have been consistent. Pt has a good risk to benefit ratio which allows the pt to function in a home environment without side effects. HISTORY OF PRESENT ILLNESS  Shar Ingram is a 59 y.o. male.  Pt presents to the office for a f/u visit for chronic pain and medication management.     Pt reports good and bad days with his pain. Pt reports his leg pain is worse than his back. He states he has a short walking tolerance, he cannot walk long before needing to rest 2-3 minutes. Pt reports pain does radiate into the posterior BLE L>R. Pt denies weakness in BLE. Pt denies saddle paresthesias. Pt states he has been using Roxicodone 5mg BID and Flexeril 10mg PRN  with relief. Pt reports usually taking Flexeril 5mg at night. Pt denies any dizziness, confusion, uncontrolled constipation, and cravings due to controlled substances. Denies persistent fevers, chills, weight changes, neurogenic bowel or bladder symptoms.      Pain effects sleep, work, standing, play and recreational activities, and his ability to perform ADLs. Pt has tried:  Physical therapy:Yes   Non-opioid medications: Yes Failed Cymbalta, Gabapentin, Lyrica  spinal injections: Yes,  BO L2-3 with benefit, last 1/2018  spinal surgery: Yes. ACDF C3-7 by Dr. Kulkarni Artist visited ED for a tick bite 8/2/18. Pt states he went to ED at [place] 9/10/18 for crackling in his chest. Pt on disability.      Opioid Assessment     Opioid Risk Tool Reviewed: YES, Score = 0  Aberrant behaviors: None. Urine Drug Screen: reviewed and up to date. UDS obtained last visit. Results reviewed and are consistent with the patient's medication use. Controlled substance agreement on file: Yes     reviewed:yes  Concomitant use of a benzodiazepine: no  MME is 15  Naloxone prescription is warranted. It has been provided or is already on file.      Reports that pain would be a 10/10 w/out medication and that it goes down to a 4-5/10 after medication.  This enables the pt to be functional, achieve ADLs and engage in social activities.        Pain Assessment  9/12/2018   Location of Pain Back;Leg   Pain Location Comment -   Location Modifiers Right;Left   Severity of Pain 8   Quality of Pain Aching   Quality of Pain Comment - Duration of Pain Persistent   Frequency of Pain Constant   Date Pain First Started -   Aggravating Factors Stairs; Walking;Standing;Squatting;Kneeling;Exercise;Straightening;Stretching;Bending   Aggravating Factors Comment -   Limiting Behavior -   Relieving Factors Other (Comment)   Relieving Factors Comment meds   Result of Injury No         PAST MEDICAL HISTORY        Past Medical History:   Diagnosis Date    Hypertension      Liver disease       Hepatitis C    Low back pain      Lumbar stenosis       L3-L4, L4-L5    Migraine headache      Numbness and tingling in left hand      Radiculopathy       Right L4-L5    Right leg pain      Sensation of cold in leg       Radiating into right posterior buttock and anterior thigh    Unsteady gait                 Past Surgical History:   Procedure Laterality Date    HX CERVICAL FUSION   05/23/2016     ACDF C3/4 C4/5 C5/6 C6/7, Dr. Melodie Haque   05/25/16     hematoma removal from cervical spine   .              MEDICATIONS    Current Outpatient Prescriptions   Medication Sig Dispense Refill    oxyCODONE IR (ROXICODONE) 5 mg immediate release tablet Take 1 tab po 1-2 times a day as needed for pain  DNF before start date  Indications: chronic pain 60 Tab 0    oxyCODONE IR (ROXICODONE) 5 mg immediate release tablet Take 1 tab po 1-2 times a day as needed for pain  DNF before start date  Indications: chronic pain 60 Tab 0    amLODIPine (NORVASC) 5 mg tablet Take 5 mg by mouth daily.        cyclobenzaprine (FLEXERIL) 10 mg tablet Take 1 Tab by mouth three (3) times daily as needed for Muscle Spasm(s). 90 Tab 2    lisinopril (PRINIVIL, ZESTRIL) 20 mg tablet Take 20 mg by mouth daily.        lisinopril-hydrochlorothiazide (PRINZIDE, ZESTORETIC) 20-25 mg per tablet Take 1 Tab by mouth daily.  90 Tab 3              ALLERGIES  Allergies   Allergen Reactions    Cymbalta [Duloxetine] Itching    Percocet [Oxycodone-Acetaminophen] Itching    SOCIAL HISTORY    Social History            Social History    Marital status:        Spouse name: N/A    Number of children: N/A    Years of education: N/A          Occupational History    Not on file.              Social History Main Topics    Smoking status: Former Smoker       Packs/day: 0.50       Years: 40.00       Types: Cigarettes, Pipe       Quit date: 4/17/2016    Smokeless tobacco: Never Used    Alcohol use 2.4 oz/week        4 Glasses of wine per week          Comment: Daily- wine and beer    Drug use: No    Sexual activity: Yes       Partners: Female       Birth control/ protection: None           Other Topics Concern    Not on file            Social History Narrative             FAMILY HISTORY  Family History   Problem Relation Age of Onset    Hypertension Mother      Heart Disease Mother      Diabetes Mother      Hypertension Father           REVIEW OF SYSTEMS  Review of Systems   Constitutional: Negative for chills, fever and weight loss. Respiratory: Negative for shortness of breath. Cardiovascular: Negative for chest pain. Gastrointestinal: Negative for constipation. Negative for fecal incontinence   Genitourinary: Negative for dysuria. Negative for urinary incontinence   Musculoskeletal:        Per HPI   Skin: Negative for rash. Neurological: Negative for dizziness, tingling, tremors, focal weakness and headaches. Endo/Heme/Allergies: Does not bruise/bleed easily. Psychiatric/Behavioral: The patient does not have insomnia.          PHYSICAL EXAMINATION       Visit Vitals    BP (!) 140/98    Pulse 81    Temp 96.7 °F (35.9 °C) (Tympanic)    SpO2 97%            Accompanied by self.        Constitutional:  Well developed, well nourished, in no acute distress. Psychiatric: Affect and mood are appropriate. Integumentary: No rashes or abrasions noted on exposed areas. Cardiovascular/Peripheral Vascular: Intact l pulses.  No peripheral edema is noted. Lymphatic:  No evidence of lymphedema. No cervical lymphadenopathy. SPINE/MUSCULOSKELETAL EXAM        Lumbar spine:  No rash, ecchymosis, or gross obliquity. No fasciculations. No focal atrophy is noted. Tenderness to palpation L4-5. No tenderness to palpation at the sciatic notch. SI joints non-tender. Trochanters non tender.       Sensation grossly intact to light touch.        MOTOR:         Hip Flex  Quads Hamstrings Ankle DF EHL Ankle PF   Right +4/5 +4/5 +4/5 +4/5 +4/5 +4/5   Left +4/5 +4/5 +4/5 +4/5 +4/5 +4/5         Straight Leg raise negative.     Ambulation without assistive device. FWB.       RADIOGRAPHS  Lumbar spine xray films reviewed:  1) diffuse degenerative changes most pronounced at L5-S1  2) no instability     Written by Funmilayo Camilo, as dictated by Silvia Alex MD.     I, Dr. Silvia Alex MD, confirm that all documentation is accurate.        Mr. Maria A Ortega may have a reminder for a \"due or due soon\" health maintenance. I have asked that he contact his primary care provider for follow-up on this health maintenance.            Note Details   Other Notes         Progress Notes from Samual Councilman, LPN   Instructions         Return in about 3 months (around 12/12/2018) for With NP.    Patient Instructions                To Take With You (Printed 9/12/2018)   Additional Documentation     Vitals:    /98 Abnormal      Pulse 81    Temp 96.7 °F (35.9 °C) (Tympanic)    SpO2 97%    Flowsheets:    Pain Assessment       Encounter Info:    Billing Info,    History,    Allergies,    Detailed Report       Communications         [facility] Amb Comm Summary sent to Jose Shin MD   Sent 9/16/2018   Media     Scan on 11/2/2018 1602 by Prakash Alatorre: Internal Documents/Blue Sheet (Scheduling Instructions)/HILARIA/[id number] on 11/2/2018 1602 by Prakash Alatorre: Internal Documents/Blue Sheet (Scheduling Instructions)/HILARIA/09-12-18    BestPractice Advisories Click to view BestPractice Advisory history   Encounter Messages     No messages in this encounter   Orders Placed         AMB POC XRAY, SPINE, LUMBOSACRAL; 4+      SCHEDULE SURGERY   Medication Changes         naloxone HCl 4 mg/actuation Use 1 spray intranasally, then discard. Repeat with new spray every 2 min as needed for opioid overdose symptoms, alternating nostrils. topiramate 25 mg 1 tab po qhs for one week then 1 tab po bid thereafter.        oxycodone HCl          5 mg Tab, Take 1 tab po 1-2 times a day as needed for pain          5 mg Tab, Take 1 tab po 1-2 times a day as needed for pain          5 mg Tab, Take 1 tab po 1-2 times a day as needed for pain      Medication List   Visit Diagnoses         Lumbar stenosis with neurogenic claudication      Problem List

## 2018-11-27 NOTE — DISCHARGE INSTRUCTIONS
[facility]   ([facility]ILAR[facility])  Dr. Vicky Braun, Dr. Virgie Wisdom, Dr. Dallas Brush not drive a car, operate heavy machinery or dangerous equipment for 24 hours. * Activity as tolerated; rest for the remainder of the day. * Resume pre-block medications including those for your family doctor. * Do not drink alcoholic beverages for 24 hours. Alcohol and the medications you have received may interact and cause an adverse reaction. * You may feel better this evening and worse tomorrow, as the numbing medications wears off and the steroid has yet to begin to work. After 48 hrs the steroid should begin to release bringing you relief. * You may shower this evening and remove any bandages. * Avoid hot tubs and heating pads for 24 hours. You may use cold packs on the procedure site as tolerated for the first 24 hours. * If a headache develops, drink plenty of fluids and rest.  Take over the counter medications for headache if needed. If the headache continues longer than 24 hours, call MD at the [address] [place] [address]. [phone]    * Continue taking pain medications as needed. * You may resume your regular diet if tolerated. Otherwise, start with sips of water and advance slowly. * If Diabetic: check your blood sugar three times a day for the next 3 days. If your sugar is greater than 300 call your family doctor. If your sugar is greater than 400, have someone transport you to the nearest Emergency Room. * If you experience any of the following problems, Please Call the [address] [place] [address] at [phone].         * Shortness of Breath    * Fever of 101 or higher    * Nausea / Vomiting    * Severe Headache    * Weakness or numbness in arms or legs that is not      resolving    * Prolonged increase in pain greater than 4 days      DISCHARGE SUMMARY from Nurse      PATIENT INSTRUCTIONS:    After oral sedation, for 24 hours or while taking prescription Narcotics:  · Limit your activities  · Do not drive and operate hazardous machinery  · Do not make important personal or business decisions  · Do  not drink alcoholic beverages  · If you have not urinated within 8 hours after discharge, please contact your surgeon on call. Report the following to your surgeon:  · Excessive pain, swelling, redness or odor of or around the surgical area  · Temperature over 101  · Nausea and vomiting lasting longer than 4 hours or if unable to take medications  · Any signs of decreased circulation or nerve impairment to extremity: change in color, persistent  numbness, tingling, coldness or increase pain  · Any questions            What to do at Home:  Recommended activity: Activity as tolerated, NO DRIVING FOR 12 Hours post injection          *  Please give a list of your current medications to your Primary Care Provider. *  Please update this list whenever your medications are discontinued, doses are      changed, or new medications (including over-the-counter products) are added. *  Please carry medication information at all times in case of emergency situations. These are general instructions for a healthy lifestyle:    No smoking/ No tobacco products/ Avoid exposure to second hand smoke    Surgeon General's Warning:  Quitting smoking now greatly reduces serious risk to your health. Obesity, smoking, and sedentary lifestyle greatly increases your risk for illness    A healthy diet, regular physical exercise & weight monitoring are important for maintaining a healthy lifestyle    You may be retaining fluid if you have a history of heart failure or if you experience any of the following symptoms:  Weight gain of 3 pounds or more overnight or 5 pounds in a week, increased swelling in our hands or feet or shortness of breath while lying flat in bed.   Please call your doctor as soon as you notice any of these symptoms; do not wait until your next office visit. Recognize signs and symptoms of STROKE:    F-face looks uneven    A-arms unable to move or move unevenly    S-speech slurred or non-existent    T-time-call 911 as soon as signs and symptoms begin-DO NOT go       Back to bed or wait to see if you get better-TIME IS BRAIN.

## 2018-12-12 ENCOUNTER — OFFICE VISIT (OUTPATIENT)
Dept: ORTHOPEDIC SURGERY | Age: 58
End: 2018-12-12

## 2018-12-12 VITALS
HEIGHT: 66 IN | TEMPERATURE: 97.7 F | HEART RATE: 73 BPM | OXYGEN SATURATION: 100 % | RESPIRATION RATE: 16 BRPM | DIASTOLIC BLOOD PRESSURE: 87 MMHG | BODY MASS INDEX: 27.1 KG/M2 | SYSTOLIC BLOOD PRESSURE: 143 MMHG | WEIGHT: 168.6 LBS

## 2018-12-12 DIAGNOSIS — M48.062 LUMBAR STENOSIS WITH NEUROGENIC CLAUDICATION: ICD-10-CM

## 2018-12-12 DIAGNOSIS — Z79.891 CHRONIC USE OF OPIATE DRUGS THERAPEUTIC PURPOSES: Primary | ICD-10-CM

## 2018-12-12 DIAGNOSIS — Z79.891 CHRONIC USE OF OPIATE DRUGS THERAPEUTIC PURPOSES: ICD-10-CM

## 2018-12-12 RX ORDER — TOPIRAMATE 25 MG/1
TABLET ORAL
Qty: 60 TAB | Refills: 0 | Status: SHIPPED | OUTPATIENT
Start: 2018-12-12 | End: 2019-06-11

## 2018-12-12 RX ORDER — OXYCODONE HYDROCHLORIDE 5 MG/1
TABLET ORAL
Qty: 60 TAB | Refills: 0 | Status: SHIPPED | OUTPATIENT
Start: 2018-12-12 | End: 2018-12-18 | Stop reason: SDUPTHER

## 2018-12-12 NOTE — PATIENT INSTRUCTIONS

## 2018-12-12 NOTE — PROGRESS NOTES
[facility] N[facility] [address].  [address], [address]  [facility], [address]  Phone: [phone]  Fax: [phone]        Antoni Peter  : 1960  PCP: Ginny Pugh MD    PROGRESS NOTE      ASSESSMENT AND PLAN    Diagnoses and all orders for this visit:    1. Chronic use of opiate drugs therapeutic purposes  -     DRUG SCREEN UR - W/ CONFIRM; Future    2. Lumbar stenosis with neurogenic claudication  -     DRUG SCREEN UR - W/ CONFIRM; Future  -     oxyCODONE IR (ROXICODONE) 5 mg immediate release tablet; Take 1 tab po 1-2 times a day as needed for pain       1. Advised to continue HEP daily. 2. Safe use of opioids discussed with pt.    3. Trial of Topamax  4. Continue Oxycodone and Flexeril  5. Given information on HEP    Follow-up Disposition:  Return in about 3 months (around 3/12/2019) for With NP. Risks and benefits of ongoing opiate therapy have been reviewed with the patient. Per review of available records and patients , there are not signs of overuse, misuse, diversion, or concerning side effects. UDS results have been consistent. Pt has a good risk to benefit ratio which allows the pt to function in a home environment without side effects. HISTORY OF PRESENT ILLNESS  Herber Sheppard. is a 59 y.o. male. Pt presents to the office for a f/u visit for chronic pain and medication management. Pt underwent L3-4 BO 18. Last OV given trial of Topamax. Pt reports benefit with BO. He states he did not  Topamax, the pharmacy did not have it. He notes he has a short standing tolerance. + shopping cart. He notes he must take breaks while grocery shopping. He reports tightness in L calf. He c/o increased pain with increased cold. He notes pain in his back, but states his legs are worse. Pt reports pain does radiate into the BLE L>R posteriorly. Pt admits to weakness in BLE R>L. Pt denies saddle paresthesias.  He affirms doing HEP intermittently. Pt states he has been using Roxicodone 5mg BID, and Flexeril 5mg PRN QHS with relief. Pt denies any dizziness, confusion, uncontrolled constipation, and cravings due to controlled substances. Denies persistent fevers, chills, weight changes, neurogenic bowel or bladder symptoms. Colonoscopy 11/6 and 11/8 2018. ED 11/23/18 for sore throat and R knee pain. He reports undergoing endoscopy yesterday to identify something he feels moving and scraping in his throat. He notes he found steel wool in his food last week, so maybe it was that. Pt is on disability. Pain effects sleep, work, standing, play and recreational activities, and his ability to perform ADLs. Pt has tried:  Physical therapy:Yes   Non-opioid medications: Yes Failed Cymbalta, Gabapentin, Lyrica  spinal injections: Yes, BO L3-4 11/27/18 w/benefit  spinal surgery: Yes. ACDF C3-7 Dr. Christin Latham    Opioid Assessment    Opioid Risk Tool Reviewed: YES, Score = 0  Aberrant behaviors: None. Urine Drug Screen: due - order placed. Controlled substance agreement on file: Yes     reviewed:yes  Concomitant use of a benzodiazepine: no  MME is 15  Naloxone prescription is warranted. It has been provided or is already on file. Reports that pain would be a 9-10/10 w/out medication and that it goes down to a 4/10 after medication. This enables the pt to be functional, achieve ADLs and engage in social activities. Pain Assessment  12/12/2018   Location of Pain Back;Leg   Pain Location Comment -   Location Modifiers Right; Inferior   Severity of Pain 8   Quality of Pain Sharp; Other (Comment)   Quality of Pain Comment N/T right leg   Duration of Pain A few hours   Frequency of Pain Intermittent   Date Pain First Started -   Aggravating Factors Walking;Standing   Aggravating Factors Comment -   Limiting Behavior -   Relieving Factors Other (Comment)   Relieving Factors Comment Sitting and meds   Result of Injury No       PAST MEDICAL HISTORY   Past Medical History:   Diagnosis Date    Hypertension     Liver disease     Hepatitis C    Low back pain     Lumbar stenosis     L3-L4, L4-L5    Migraine headache     Numbness and tingling in left hand     Radiculopathy     Right L4-L5    Right leg pain     Sensation of cold in leg     Radiating into right posterior buttock and anterior thigh    Unsteady gait        Past Surgical History:   Procedure Laterality Date    COLONOSCOPY N/A 11/6/2018    COLONOSCOPY, SCREENING /c Bx Polypectomy /c Hot Snared Polypectomy performed by Domingo Olmedo MD at [facility] 11/9/2018    SIGMOIDOSCOPY FLEXIBLE: ENDO CLIP APPLICATION performed by Domingo Olmedo MD at [address] [place] [address]  05/23/2016    ACDF C3/4 C4/5 C5/6 C6/7, Dr. Peter Veronica  05/25/16    hematoma removal from cervical spine   . MEDICATIONS    Current Outpatient Medications   Medication Sig Dispense Refill    lidocaine (LIDOCAINE VISCOUS) 2 % solution Put 10 mL in mouth and swish and spit out QAC and at bedtime 200 mL 0    lisinopril (PRINIVIL, ZESTRIL) 20 mg tablet Take 20 mg by mouth nightly.  oxyCODONE IR (ROXICODONE) 5 mg immediate release tablet Take 1 tab po 1-2 times a day as needed for pain  Indications: chronic pain 60 Tab 0    naloxone (NARCAN) 4 mg/actuation nasal spray Use 1 spray intranasally, then discard. Repeat with new spray every 2 min as needed for opioid overdose symptoms, alternating nostrils. 1 Each 0    amLODIPine (NORVASC) 5 mg tablet Take 5 mg by mouth daily.  cyclobenzaprine (FLEXERIL) 10 mg tablet Take 1 Tab by mouth three (3) times daily as needed for Muscle Spasm(s). 90 Tab 2    lisinopril-hydrochlorothiazide (PRINZIDE, ZESTORETIC) 20-25 mg per tablet Take 1 Tab by mouth daily. 90 Tab 3    topiramate (TOPAMAX) 25 mg tablet 1 tab po qhs for one week then 1 tab po bid thereafter.  60 Tab 0 ALLERGIES  Allergies   Allergen Reactions    Cymbalta [Duloxetine] Itching    Percocet [Oxycodone-Acetaminophen] Itching          SOCIAL HISTORY    Social History     Socioeconomic History    Marital status:      Spouse name: Not on file    Number of children: Not on file    Years of education: Not on file    Highest education level: Not on file   Social Needs    Financial resource strain: Not on file    Food insecurity - worry: Not on file    Food insecurity - inability: Not on file    Transportation needs - medical: Not on file   [employer] needs - non-medical: Not on file   Occupational History    Not on file   Tobacco Use    Smoking status: Former Smoker     Packs/day: 0.50     Years: 40.00     Pack years: 20.00     Types: Cigarettes, Pipe     Last attempt to quit: 2016     Years since quittin.6    Smokeless tobacco: Never Used   Substance and Sexual Activity    Alcohol use: Yes     Alcohol/week: 2.4 oz     Types: 4 Glasses of wine per week     Comment: Daily- wine and beer    Drug use: No    Sexual activity: Yes     Partners: Female     Birth control/protection: None   Other Topics Concern    Not on file   Social History Narrative    Not on file       FAMILY HISTORY  Family History   Problem Relation Age of Onset    Hypertension Mother     Heart Disease Mother     Diabetes Mother     Hypertension Father        REVIEW OF SYSTEMS  Review of Systems   Constitutional: Negative for chills, fever and weight loss. Respiratory: Negative for shortness of breath. Cardiovascular: Negative for chest pain. Gastrointestinal: Negative for constipation. Negative for fecal incontinence   Genitourinary: Negative for dysuria. Negative for urinary incontinence   Musculoskeletal: Positive for back pain. Per HPI   Skin: Negative for rash. Neurological: Positive for sensory change. Negative for dizziness, tingling, tremors, focal weakness and headaches. Endo/Heme/Allergies: Does not bruise/bleed easily. Psychiatric/Behavioral: The patient does not have insomnia. PHYSICAL EXAMINATION  Visit Vitals  /87   Pulse 73   Temp 97.7 °F (36.5 °C)   Resp 16   Ht 5' 6\" (1.676 m)   Wt 168 lb 9.6 oz (76.5 kg)   SpO2 100%   BMI 27.21 kg/m²         Accompanied by self. Constitutional:  Well developed, well nourished, in no acute distress. Psychiatric: Affect and mood are appropriate. Integumentary: No rashes or abrasions noted on exposed areas. Cardiovascular/Peripheral Vascular: Intact l pulses. No peripheral edema is noted. Lymphatic:  No evidence of lymphedema. No cervical lymphadenopathy. SPINE/MUSCULOSKELETAL EXAM      Lumbar spine:  No rash, ecchymosis, or gross obliquity. No fasciculations. No focal atrophy is noted. Tenderness to palpation none. No tenderness to palpation at the sciatic notch. SI joints non-tender. Trochanters non tender. Sensation grossly intact to light touch. MOTOR:       Hip Flex  Quads Hamstrings Ankle DF EHL Ankle PF   Right +4/5 +4/5 +4/5 +4/5 +4/5 +4/5   Left +4/5 +4/5 +4/5 +4/5 +4/5 +4/5       Straight Leg raise negative. Ambulation without assistive device. FWB. Written by Mariela Donato, as dictated by Shae Knutson MD.    I, Dr. Shae Knutson MD, confirm that all documentation is accurate. Mr. Mike Doran may have a reminder for a \"due or due soon\" health maintenance. I have asked that he contact his primary care provider for follow-up on this health maintenance.

## 2018-12-18 ENCOUNTER — TELEPHONE (OUTPATIENT)
Dept: ORTHOPEDIC SURGERY | Age: 58
End: 2018-12-18

## 2018-12-18 DIAGNOSIS — M48.062 LUMBAR STENOSIS WITH NEUROGENIC CLAUDICATION: ICD-10-CM

## 2018-12-18 RX ORDER — OXYCODONE HYDROCHLORIDE 5 MG/1
TABLET ORAL
Qty: 60 TAB | Refills: 0 | Status: ON HOLD | OUTPATIENT
Start: 2019-02-10 | End: 2019-03-26

## 2018-12-18 RX ORDER — OXYCODONE HYDROCHLORIDE 5 MG/1
TABLET ORAL
Qty: 60 TAB | Refills: 0 | Status: SHIPPED | OUTPATIENT
Start: 2019-01-11 | End: 2019-06-11 | Stop reason: SDUPTHER

## 2018-12-18 NOTE — TELEPHONE ENCOUNTER
Patient called for Shahn[facility] Line. Patient said he had his Urine Test done last week,and would like to know if he can  the Roxicodone medication. Will  from [facility] location. Patient tel. [phone].

## 2018-12-19 NOTE — TELEPHONE ENCOUNTER
-called patient and verified name and . The patient was informed to pickup medication at the  of the [address] [place] [address]. The patient verbalized understanding.

## 2019-03-12 ENCOUNTER — OFFICE VISIT (OUTPATIENT)
Dept: ORTHOPEDIC SURGERY | Age: 59
End: 2019-03-12

## 2019-03-12 VITALS
DIASTOLIC BLOOD PRESSURE: 84 MMHG | BODY MASS INDEX: 29.19 KG/M2 | OXYGEN SATURATION: 98 % | HEART RATE: 93 BPM | WEIGHT: 175.2 LBS | SYSTOLIC BLOOD PRESSURE: 134 MMHG | RESPIRATION RATE: 16 BRPM | TEMPERATURE: 98.5 F | HEIGHT: 65 IN

## 2019-03-12 DIAGNOSIS — M48.062 LUMBAR STENOSIS WITH NEUROGENIC CLAUDICATION: Primary | ICD-10-CM

## 2019-03-12 RX ORDER — OXYCODONE HYDROCHLORIDE 5 MG/1
5 TABLET ORAL
Qty: 60 TAB | Refills: 0 | Status: ON HOLD | OUTPATIENT
Start: 2019-05-11 | End: 2019-03-26

## 2019-03-12 RX ORDER — OXYCODONE HYDROCHLORIDE 5 MG/1
5 TABLET ORAL
Qty: 60 TAB | Refills: 0 | Status: ON HOLD | OUTPATIENT
Start: 2019-03-12 | End: 2019-03-26

## 2019-03-12 RX ORDER — OXYCODONE HYDROCHLORIDE 5 MG/1
5 TABLET ORAL
Qty: 60 TAB | Refills: 0 | Status: ON HOLD | OUTPATIENT
Start: 2019-04-11 | End: 2019-03-26

## 2019-03-12 NOTE — PATIENT INSTRUCTIONS
Learning About How to Have a Healthy Back  What causes back pain? Back pain is often caused by overuse, strain, or injury. For example, people often hurt their backs playing sports or working in the yard, being jolted in a car accident, or lifting something too heavy. Aging plays a part too. Your bones and muscles tend to lose strength as you age, which makes injury more likely. The spongy discs between the bones of the spine (vertebrae) may suffer from wear and tear and no longer provide enough cushion between the bones. A disc that bulges or breaks open (herniated disc) can press on nerves, causing back pain. In some people, back pain is the result of arthritis, broken vertebrae caused by bone loss (osteoporosis), illness, or a spine problem. Although most people have back pain at one time or another, there are steps you can take to make it less likely. How can you have a healthy back? Reduce stress on your back through good posture  Slumping or slouching alone may not cause low back pain. But after the back has been strained or injured, bad posture can make pain worse. · Sleep in a position that maintains your back's normal curves and on a mattress that feels comfortable. Sleep on your side with a pillow between your knees, or sleep on your back with a pillow under your knees. These positions can reduce strain on your back. · Stand and sit up straight. \"Good posture\" generally means your ears, shoulders, and hips are in a straight line. · If you must stand for a long time, put one foot on a stool, ledge, or box. Switch feet every now and then. · Sit in a chair that is low enough to let you place both feet flat on the floor with both knees nearly level with your hips. If your chair or desk is too high, use a footrest to raise your knees. Place a small pillow, a rolled-up towel, or a lumbar roll in the curve of your back if you need extra support.   · Try a kneeling chair, which helps tilt your hips forward. This takes pressure off your lower back. · Try sitting on an exercise ball. It can rock from side to side, which helps keep your back loose. · When driving, keep your knees nearly level with your hips. Sit straight, and drive with both hands on the steering wheel. Your arms should be in a slightly bent position. Reduce stress on your back through careful lifting  · Squat down, bending at the hips and knees only. If you need to, put one knee to the floor and extend your other knee in front of you, bent at a right angle (half kneeling). · Press your chest straight forward. This helps keep your upper back straight while keeping a slight arch in your low back. · Hold the load as close to your body as possible, at the level of your belly button (navel). · Use your feet to change direction, taking small steps. · Lead with your hips as you change direction. Keep your shoulders in line with your hips as you move. · Set down your load carefully, squatting with your knees and hips only. Exercise and stretch your back  · Do some exercise on most days of the week, if your doctor says it is okay. You can walk, run, swim, or cycle. · Stretch your back muscles. Here are a few exercises to try:  ? Lie on your back, and gently pull one bent knee to your chest. Put that foot back on the floor, and then pull the other knee to your chest.  ? Do pelvic tilts. Lie on your back with your knees bent. Tighten your stomach muscles. Pull your belly button (navel) in and up toward your ribs. You should feel like your back is pressing to the floor and your hips and pelvis are slightly lifting off the floor. Hold for 6 seconds while breathing smoothly. ? Sit with your back flat against a wall. · Keep your core muscles strong. The muscles of your back, belly (abdomen), and buttocks support your spine. ? Pull in your belly and imagine pulling your navel toward your spine. Hold this for 6 seconds, then relax.  Remember to keep breathing normally as you tense your muscles. ? Do curl-ups. Always do them with your knees bent. Keep your low back on the floor, and curl your shoulders toward your knees using a smooth, slow motion. Keep your arms folded across your chest. If this bothers your neck, try putting your hands behind your neck (not your head), with your elbows spread apart. ? Lie on your back with your knees bent and your feet flat on the floor. Tighten your belly muscles, and then push with your feet and raise your buttocks up a few inches. Hold this position 6 seconds as you continue to breathe normally, then lower yourself slowly to the floor. Repeat 8 to 12 times. ? If you like group exercise, try Pilates or yoga. These classes have poses that strengthen the core muscles. Lead a healthy lifestyle  · Stay at a healthy weight to avoid strain on your back. · Do not smoke. Smoking increases the risk of osteoporosis, which weakens the spine. If you need help quitting, talk to your doctor about stop-smoking programs and medicines. These can increase your chances of quitting for good. Where can you learn more? Go to http://zulma-susana.info/. Enter L315 in the search box to learn more about \"Learning About How to Have a Healthy Back. \"  Current as of: September 20, 2018  Content Version: 11.9  © 8277-4877 [employer]. Care instructions adapted under license by [employer] (which disclaims liability or warranty for this information). If you have questions about a medical condition or this instruction, always ask your healthcare professional. [place] [address] any warranty or liability for your use of this information.

## 2019-03-12 NOTE — H&P (VIEW-ONLY)
Chief complaint/History of Present Illness: Chief Complaint Patient presents with  Back Pain  Leg Pain  
  davis Morales. is a  59 y.o.  male HISTORY OF PRESENT ILLNESS:  This is a patient who comes in today for followup of his chronic low back pain, bilateral lower extremity left greater than right. He has posterior leg pain that radiates down to his calves and sometimes to his toes. He has had physical therapy in the past and is doing a home exercise program.  He did try the Topamax 25 mg that Dr. Adella Spurling gave him, but he did not increase it to b.i.d., and he did not feel like the Topamax helped so he stopped it. He has failed Cymbalta, Lyrica and gabapentin in the past.  He is taking oxycodone 5 mg b.i.d. for a pain level of 10 out of 10 which will bring it down to a 4 out of 10. He also takes Flexeril 5 mg at night p.r.n. but not every night. Since we last saw him, he had a colonoscopy 11/2018. Apparently they accidentally clipped a blood vessel. He had to have 2 surgeries to repair that. He then had an endoscopy 01/2019 due to some swallowing difficulties. They stated he had some flap that was not closing, that it was paralyzed from his neck surgery back in 2016 where he had an ACDF C3, C4, C4-6, C6-7 and a corpectomy at C5-6. He states he is not going to do anything about that, that the swallowing difficulty does not bother him that much, and he does not want further surgery. He has not lost any weight. He has actually gained weight. He is a nonsmoker. He is on Social Security Disability. He denies fever, bowel or bladder dysfunction. PHYSICAL EXAMINATION:  Mr. Edi Billingsley is a 75-year-old male. He is alert and oriented. Normal mood and affect. He has a full weightbearing non antalgic gait. No assistive device. He has 4/5 strength bilateral lower extremities. Negative straight leg raise. He does have pain with hyperextension over the lumbar  spine. ASSESSMENT/PLAN:  This is a patient with lumbar spinal stenosis. He may consider surgery in the future but does not wish to consider it right now. I have told him he would need a new MRI should he decide to have surgery. He would like to repeat his L3-4 lumbar epidural.  The last one was 11/2018 so we will set that up for him. It does help him quite a bit. I asked him to try increasing Topamax to b.i.d. to see if that helped. He agrees with that. We have refilled his medication. His  is appropriate. ORT score is 0. MME is 15. UDS was consistent last visit. We will see him back in 3 months, sooner if needed. Review of systems: 
 
Past Medical History:  
Diagnosis Date  Hypertension  Liver disease Hepatitis C  
 Low back pain  Lumbar stenosis L3-L4, L4-L5  Migraine headache  Numbness and tingling in left hand  Radiculopathy Right L4-L5  Right leg pain  Sensation of cold in leg Radiating into right posterior buttock and anterior thigh  Unsteady gait Past Surgical History:  
Procedure Laterality Date  COLONOSCOPY N/A 11/6/2018 COLONOSCOPY, SCREENING /c Bx Polypectomy /c Hot Snared Polypectomy performed by Kimberley Will MD at [address] [place] [address] ENDOSCOPY 2021 [facility] N/A 11/9/2018 SIGMOIDOSCOPY FLEXIBLE: ENDO CLIP APPLICATION [facility] [address] [facility]  05/23/2016 ACDF C3/4 C4/5 C5/6 C6/7, Dr. uSn Hutchins  NEUROLOGICAL PROCEDURE UNLISTED  05/25/16  
 hematoma removal from cervical spine Social History Socioeconomic History  Marital status:  Spouse name: Not on file  Number of children: Not on file  Years of education: Not on file  Highest education level: Not on file Social Needs  Financial resource strain: Not on file  Food insecurity - worry: Not on file  Food insecurity - inability: Not on file  Transportation needs - medical: Not on file  Transportation needs - non-medical: Not on file Occupational History  Not on file Tobacco Use  Smoking status: Former Smoker Packs/day: 0.50 Years: 40.00 Pack years: 20.00 Types: Cigarettes, Pipe Last attempt to quit: 2016 Years since quittin.9  Smokeless tobacco: Never Used Substance and Sexual Activity  Alcohol use: Yes Alcohol/week: 2.4 oz Types: 4 Glasses of wine per week Comment: Daily- wine and beer  Drug use: No  
 Sexual activity: Yes  
  Partners: Female Birth control/protection: None Other Topics Concern  Not on file Social History Narrative  Not on file Family History Problem Relation Age of Onset  Hypertension Mother  Heart Disease Mother  Diabetes Mother  Hypertension Father Physical Exam: 
Visit Vitals /84 (BP 1 Location: Right arm, BP Patient Position: Sitting) Pulse 93 Temp 98.5 °F (36.9 °C) (Oral) Resp 16 Ht 5' 5\" (1.651 m) Wt 175 lb 3.2 oz (79.5 kg) SpO2 98% BMI 29.15 kg/m² Pain Scale: /10 Least pain over the last week has been 4/10. Worst pain over the last week has been 10/10. Opioid Risk Tool Reviewed: YES, Score 0 Aberrant behaviors: None. Urine Drug Screen: 18 consistent. Controlled substance agreement on file: yes.  reviewed:yes Pill count is consistent with his prescription: n/a Concomitant use of a benzodiazepine: n0 
MME 15 Narcan not warranted  
 
  
Risks and benefits of ongoing opiate therapy have been reviewed with the patient. No pain behaviors. Denies thoughts of harming self or others. Pt has a good risk to benefit ratio which allows the pt to function in a home environment without side effects 
  
 has been . reviewed and is appropriate Pt has a consistent UDS in the record Diagnoses and all orders for this visit: 1. Lumbar stenosis with neurogenic claudication -     SCHEDULE SURGERY 
-     oxyCODONE IR (ROXICODONE) 5 mg immediate release tablet; Take 1 Tab by mouth two (2) times daily as needed for Pain for up to 30 days. Max Daily Amount: 10 mg. 
-     oxyCODONE IR (ROXICODONE) 5 mg immediate release tablet; Take 1 Tab by mouth two (2) times daily as needed for Pain for up to 30 days. Max Daily Amount: 10 mg. 
-     oxyCODONE IR (ROXICODONE) 5 mg immediate release tablet; Take 1 Tab by mouth two (2) times daily as needed for Pain for up to 30 days. Max Daily Amount: 10 mg. Follow-up Disposition: 
Return in about 3 months (around 6/12/2019) for with NP. We have informed Paz Varghese to notify us for immediate appointment if he has any worsening neurogical symptoms or if an emergency situation presents, then call [phone]

## 2019-03-12 NOTE — PROGRESS NOTES
Chief complaint/History of Present Illness:  Chief Complaint   Patient presents with    Back Pain    Leg Pain     bilvanesa Tovar. is a  59 y.o.  male      HISTORY OF PRESENT ILLNESS:  This is a patient who comes in today for followup of his chronic low back pain, bilateral lower extremity left greater than right. He has posterior leg pain that radiates down to his calves and sometimes to his toes. He has had physical therapy in the past and is doing a home exercise program.  He did try the Topamax 25 mg that Dr. Luis Salcedo gave him, but he did not increase it to b.i.d., and he did not feel like the Topamax helped so he stopped it. He has failed Cymbalta, Lyrica and gabapentin in the past.  He is taking oxycodone 5 mg b.i.d. for a pain level of 10 out of 10 which will bring it down to a 4 out of 10. He also takes Flexeril 5 mg at night p.r.n. but not every night. Since we last saw him, he had a colonoscopy 11/2018. Apparently they accidentally clipped a blood vessel. He had to have 2 surgeries to repair that. He then had an endoscopy 01/2019 due to some swallowing difficulties. They stated he had some flap that was not closing, that it was paralyzed from his neck surgery back in 2016 where he had an ACDF C3, C4, C4-6, C6-7 and a corpectomy at C5-6. He states he is not going to do anything about that, that the swallowing difficulty does not bother him that much, and he does not want further surgery. He has not lost any weight. He has actually gained weight. He is a nonsmoker. He is on Social Security Disability. He denies fever, bowel or bladder dysfunction. PHYSICAL EXAMINATION:  Mr. Archie Connolly is a 67-year-old male. He is alert and oriented. Normal mood and affect. He has a full weightbearing non antalgic gait. No assistive device. He has 4/5 strength bilateral lower extremities. Negative straight leg raise. He does have pain with hyperextension over the lumbar  spine. ASSESSMENT/PLAN:  This is a patient with lumbar spinal stenosis. He may consider surgery in the future but does not wish to consider it right now. I have told him he would need a new MRI should he decide to have surgery. He would like to repeat his L3-4 lumbar epidural.  The last one was 11/2018 so we will set that up for him. It does help him quite a bit. I asked him to try increasing Topamax to b.i.d. to see if that helped. He agrees with that. We have refilled his medication. His  is appropriate. ORT score is 0. MME is 15. UDS was consistent last visit. We will see him back in 3 months, sooner if needed.          Review of systems:    Past Medical History:   Diagnosis Date    Hypertension     Liver disease     Hepatitis C    Low back pain     Lumbar stenosis     L3-L4, L4-L5    Migraine headache     Numbness and tingling in left hand     Radiculopathy     Right L4-L5    Right leg pain     Sensation of cold in leg     Radiating into right posterior buttock and anterior thigh    Unsteady gait      Past Surgical History:   Procedure Laterality Date    COLONOSCOPY N/A 11/6/2018    COLONOSCOPY, SCREENING /c Bx Polypectomy /c Hot Snared Polypectomy performed by Mk Cruz MD at [address] N/A 11/9/2018    SIGMOIDOSCOPY FLEXIBLE: ENDO CLIP APPLICATION performed by Mk Cruz MD at [address] [facility] [address] [place] [address]  05/23/2016    ACDF C3/4 C4/5 C5/6 C6/7, Dr. Antonia Farah  05/25/16    hematoma removal from cervical spine     Social History     Socioeconomic History    Marital status:      Spouse name: Not on file    Number of children: Not on file    Years of education: Not on file    Highest education level: Not on file   Social Needs    Financial resource strain: Not on file    Food insecurity - worry: Not on file    Food insecurity - inability: Not on file    Transportation needs - medical: Not on file   [employer] needs - non-medical: Not on file   Occupational History    Not on file   Tobacco Use    Smoking status: Former Smoker     Packs/day: 0.50     Years: 40.00     Pack years: 20.00     Types: Cigarettes, Pipe     Last attempt to quit: 2016     Years since quittin.9    Smokeless tobacco: Never Used   Substance and Sexual Activity    Alcohol use: Yes     Alcohol/week: 2.4 oz     Types: 4 Glasses of wine per week     Comment: Daily- wine and beer    Drug use: No    Sexual activity: Yes     Partners: Female     Birth control/protection: None   Other Topics Concern    Not on file   Social History Narrative    Not on file     Family History   Problem Relation Age of Onset    Hypertension Mother     Heart Disease Mother     Diabetes Mother     Hypertension Father        Physical Exam:  Visit Vitals  /84 (BP 1 Location: Right arm, BP Patient Position: Sitting)   Pulse 93   Temp 98.5 °F (36.9 °C) (Oral)   Resp 16   Ht 5' 5\" (1.651 m)   Wt 175 lb 3.2 oz (79.5 kg)   SpO2 98%   BMI 29.15 kg/m²     Pain Scale: /10    Least pain over the last week has been 4/10. Worst pain over the last week has been 10/10. Opioid Risk Tool Reviewed: YES, Score 0  Aberrant behaviors: None. Urine Drug Screen: 18 consistent. Controlled substance agreement on file: yes.  reviewed:yes  Pill count is consistent with his prescription: n/a  Concomitant use of a benzodiazepine: n0  MME 15  Narcan not warranted        Risks and benefits of ongoing opiate therapy have been reviewed with the patient. No pain behaviors. Denies thoughts of harming self or others. Pt has a good risk to benefit ratio which allows the pt to function in a home environment without side effects        has been . reviewed and is appropriate    Pt has a consistent UDS in the record      Diagnoses and all orders for this visit:    1.  Lumbar stenosis with neurogenic claudication  -     SCHEDULE SURGERY  -     oxyCODONE IR (ROXICODONE) 5 mg immediate release tablet; Take 1 Tab by mouth two (2) times daily as needed for Pain for up to 30 days. Max Daily Amount: 10 mg.  -     oxyCODONE IR (ROXICODONE) 5 mg immediate release tablet; Take 1 Tab by mouth two (2) times daily as needed for Pain for up to 30 days. Max Daily Amount: 10 mg.  -     oxyCODONE IR (ROXICODONE) 5 mg immediate release tablet; Take 1 Tab by mouth two (2) times daily as needed for Pain for up to 30 days. Max Daily Amount: 10 mg. Follow-up Disposition:  Return in about 3 months (around 6/12/2019) for with NP.         We have informed Swati Deshpande. to notify us for immediate appointment if he has any worsening neurogical symptoms or if an emergency situation presents, then call [phone]

## 2019-03-26 ENCOUNTER — HOSPITAL ENCOUNTER (OUTPATIENT)
Age: 59
Setting detail: OUTPATIENT SURGERY
Discharge: HOME | End: 2019-03-26
Attending: PHYSICAL MEDICINE & REHABILITATION | Admitting: PHYSICAL MEDICINE & REHABILITATION
Payer: MEDICARE

## 2019-03-26 ENCOUNTER — HOSPITAL ENCOUNTER (OUTPATIENT)
Dept: LAB | Age: 59
Discharge: HOME | End: 2019-03-26
Payer: MEDICARE

## 2019-03-26 ENCOUNTER — APPOINTMENT (OUTPATIENT)
Dept: GENERAL RADIOLOGY | Age: 59
End: 2019-03-26
Attending: PHYSICAL MEDICINE & REHABILITATION
Payer: MEDICARE

## 2019-03-26 VITALS
TEMPERATURE: 98.6 F | BODY MASS INDEX: 28.12 KG/M2 | HEIGHT: 66 IN | RESPIRATION RATE: 20 BRPM | HEART RATE: 91 BPM | DIASTOLIC BLOOD PRESSURE: 88 MMHG | OXYGEN SATURATION: 100 % | WEIGHT: 175 LBS | SYSTOLIC BLOOD PRESSURE: 130 MMHG

## 2019-03-26 LAB
ALBUMIN SERPL-MCNC: 4.1 G/DL (ref 3.4–5)
ALBUMIN/GLOB SERPL: 1.1 {RATIO} (ref 0.8–1.7)
ALP SERPL-CCNC: 69 U/L (ref 45–117)
ALT SERPL-CCNC: 26 U/L (ref 16–61)
ANION GAP SERPL CALC-SCNC: 8 MMOL/L (ref 3–18)
AST SERPL-CCNC: 29 U/L (ref 15–37)
BASOPHILS # BLD: 0.1 K/UL (ref 0–0.1)
BASOPHILS NFR BLD: 1 % (ref 0–2)
BILIRUB SERPL-MCNC: 0.7 MG/DL (ref 0.2–1)
BUN SERPL-MCNC: 13 MG/DL (ref 7–18)
BUN/CREAT SERPL: 13 (ref 12–20)
CALCIUM SERPL-MCNC: 9.4 MG/DL (ref 8.5–10.1)
CHLORIDE SERPL-SCNC: 102 MMOL/L (ref 100–108)
CHOLEST SERPL-MCNC: 180 MG/DL
CO2 SERPL-SCNC: 26 MMOL/L (ref 21–32)
CREAT SERPL-MCNC: 0.98 MG/DL (ref 0.6–1.3)
DIFFERENTIAL METHOD BLD: ABNORMAL
EOSINOPHIL # BLD: 0.3 K/UL (ref 0–0.4)
EOSINOPHIL NFR BLD: 4 % (ref 0–5)
ERYTHROCYTE [DISTWIDTH] IN BLOOD BY AUTOMATED COUNT: 15.7 % (ref 11.6–14.5)
GLOBULIN SER CALC-MCNC: 3.8 G/DL (ref 2–4)
GLUCOSE SERPL-MCNC: 115 MG/DL (ref 74–99)
HCT VFR BLD AUTO: 36.9 % (ref 36–48)
HDLC SERPL-MCNC: 53 MG/DL (ref 40–60)
HDLC SERPL: 3.4 {RATIO} (ref 0–5)
HGB BLD-MCNC: 12.5 G/DL (ref 13–16)
LDLC SERPL CALC-MCNC: 115.2 MG/DL (ref 0–100)
LIPID PROFILE,FLP: ABNORMAL
LYMPHOCYTES # BLD: 2.5 K/UL (ref 0.9–3.6)
LYMPHOCYTES NFR BLD: 30 % (ref 21–52)
MCH RBC QN AUTO: 28 PG (ref 24–34)
MCHC RBC AUTO-ENTMCNC: 33.9 G/DL (ref 31–37)
MCV RBC AUTO: 82.7 FL (ref 74–97)
MONOCYTES # BLD: 0.8 K/UL (ref 0.05–1.2)
MONOCYTES NFR BLD: 10 % (ref 3–10)
NEUTS SEG # BLD: 4.6 K/UL (ref 1.8–8)
NEUTS SEG NFR BLD: 55 % (ref 40–73)
PLATELET # BLD AUTO: 197 K/UL (ref 135–420)
PMV BLD AUTO: 9.7 FL (ref 9.2–11.8)
POTASSIUM SERPL-SCNC: 4.5 MMOL/L (ref 3.5–5.5)
PROT SERPL-MCNC: 7.9 G/DL (ref 6.4–8.2)
RBC # BLD AUTO: 4.46 M/UL (ref 4.7–5.5)
SODIUM SERPL-SCNC: 136 MMOL/L (ref 136–145)
T4 SERPL-MCNC: 6.7 UG/DL (ref 4.7–13.3)
TRIGL SERPL-MCNC: 59 MG/DL (ref ?–150)
TSH SERPL DL<=0.05 MIU/L-ACNC: 0.73 UIU/ML (ref 0.36–3.74)
VLDLC SERPL CALC-MCNC: 11.8 MG/DL
WBC # BLD AUTO: 8.3 K/UL (ref 4.6–13.2)

## 2019-03-26 PROCEDURE — 76010000009 HC PAIN MGT 0 TO 30 MIN PROC: Performed by: PHYSICAL MEDICINE & REHABILITATION

## 2019-03-26 PROCEDURE — 80053 COMPREHEN METABOLIC PANEL: CPT

## 2019-03-26 PROCEDURE — 85025 COMPLETE CBC W/AUTO DIFF WBC: CPT

## 2019-03-26 PROCEDURE — 74011250637 HC RX REV CODE- 250/637: Performed by: PHYSICAL MEDICINE & REHABILITATION

## 2019-03-26 PROCEDURE — 87521 HEPATITIS C PROBE&RVRS TRNSC: CPT

## 2019-03-26 PROCEDURE — 80061 LIPID PANEL: CPT

## 2019-03-26 PROCEDURE — 87522 HEPATITIS C REVRS TRNSCRPJ: CPT

## 2019-03-26 PROCEDURE — 77030014124 HC TY EPDRL BBMI -A: Performed by: PHYSICAL MEDICINE & REHABILITATION

## 2019-03-26 PROCEDURE — 74011250636 HC RX REV CODE- 250/636: Performed by: PHYSICAL MEDICINE & REHABILITATION

## 2019-03-26 PROCEDURE — 84436 ASSAY OF TOTAL THYROXINE: CPT

## 2019-03-26 PROCEDURE — 74011636320 HC RX REV CODE- 636/320: Performed by: PHYSICAL MEDICINE & REHABILITATION

## 2019-03-26 PROCEDURE — 36415 COLL VENOUS BLD VENIPUNCTURE: CPT

## 2019-03-26 RX ORDER — DEXAMETHASONE SODIUM PHOSPHATE 100 MG/10ML
INJECTION INTRAMUSCULAR; INTRAVENOUS AS NEEDED
Status: DISCONTINUED | OUTPATIENT
Start: 2019-03-26 | End: 2019-03-26 | Stop reason: HOSPADM

## 2019-03-26 RX ORDER — LIDOCAINE HYDROCHLORIDE 10 MG/ML
INJECTION, SOLUTION EPIDURAL; INFILTRATION; INTRACAUDAL; PERINEURAL AS NEEDED
Status: DISCONTINUED | OUTPATIENT
Start: 2019-03-26 | End: 2019-03-26 | Stop reason: HOSPADM

## 2019-03-26 RX ORDER — DIAZEPAM 5 MG/1
5-20 TABLET ORAL ONCE
Status: COMPLETED | OUTPATIENT
Start: 2019-03-26 | End: 2019-03-26

## 2019-03-26 RX ADMIN — DIAZEPAM 5 MG: 5 TABLET ORAL at 08:39

## 2019-03-26 NOTE — INTERVAL H&P NOTE
H&P Update: 
Keegan Day. was seen and briefly examined. Last BO about 4 months ago. Trying to avoid sx. History and physical has been reviewed.   There have been no significant clinical changes since the completion of the originally dated History and Physical. 
 
Signed By: Dg Wood MD   
 March 26, 2019 9:18 AM

## 2019-03-26 NOTE — PROCEDURES
Intralaminar Epidural Steroid Procedure Note        Patient Name   Ovi Rees. Date of Procedure: March 26, 2019  Preoperative Diagnosis: Lumbar spinal stenosis  Postoperative Diagnosis: Same  Location [facility], [address]      Procedure:  Epidural Steroid Injection    Consent:  Informed consent was obtained prior to the procedure. In addition to the potential risks associated with the procedure itself, the patient was informed both verbally and in writing of the potential side effects of the use of glucocorticoid. The patient appeared to comprehend the informed consent and desired to have the procedure performed. Procedure in Detail:  The patient was taken to the procedure suite and placed in the prone position on the operating table on appropriate padding. The posterior lumbar region was prepped and draped in the usual sterile fashion. Intraoperative fluoroscopy was used to localize the L3-L4 interspace. The skin was infiltrated with 1% lidocaine. An 18-gauge Tuohy needle was advanced into the epidural space at L3-L4 under fluoroscopic guidance using the loss of resistance technique. No cerebrospinal fluid was seen throughout the procedure. Yes  A small amount of Isovue was injected into the epidural space, confirming appropriated needle placement on fluoroscopy. No vascular uptake was identified. Next, 2ml of 1% Lidocaine and 30mg of preservative free Dexamethasone were injected via the Tuohy needle. The needle was removed from the patient. The patient tolerated the procedure well and was discharged home with designated  and care instructions.       Signed By: Homero Justin MD                        March 26, 2019

## 2019-03-26 NOTE — DISCHARGE INSTRUCTIONS
[facility]   ([place])  Dr. Elver Garcia, Dr. Jennifer Nascimento, Dr. Fontanez All not drive a car, operate heavy machinery or dangerous equipment for 24 hours. * Activity as tolerated; rest for the remainder of the day. * Resume pre-block medications including those for your family doctor. * Do not drink alcoholic beverages for 24 hours. Alcohol and the medications you have received may interact and cause an adverse reaction. * You may feel better this evening and worse tomorrow, as the numbing medications wears off and the steroid has yet to begin to work. After 48 hrs the steroid should begin to release bringing you relief. * You may shower this evening and remove any bandages. * Avoid hot tubs and heating pads for 24 hours. You may use cold packs on the procedure site as tolerated for the first 24 hours. * If a headache develops, drink plenty of fluids and rest.  Take over the counter medications for headache if needed. If the headache continues longer than 24 hours, call MD at the [address] [place] [address]. [phone]    * Continue taking pain medications as needed. * You may resume your regular diet if tolerated. Otherwise, start with sips of water and advance slowly. * If Diabetic: check your blood sugar three times a day for the next 3 days. If your sugar is greater than 300 call your family doctor. If your sugar is greater than 400, have someone transport you to the nearest Emergency Room. * If you experience any of the following problems, Please Call the [address] [place] [address] at [phone].         * Shortness of Breath    * Fever of 101 or higher    * Nausea / Vomiting    * Severe Headache    * Weakness or numbness in arms or legs that is not      resolving    * Prolonged increase in pain greater than 4 days      DISCHARGE SUMMARY from Nurse      PATIENT INSTRUCTIONS:    After oral sedation, for 24 hours or while taking prescription Narcotics:  · Limit your activities  · Do not drive and operate hazardous machinery  · Do not make important personal or business decisions  · Do  not drink alcoholic beverages  · If you have not urinated within 8 hours after discharge, please contact your surgeon on call. Report the following to your surgeon:  · Excessive pain, swelling, redness or odor of or around the surgical area  · Temperature over 101  · Nausea and vomiting lasting longer than 4 hours or if unable to take medications  · Any signs of decreased circulation or nerve impairment to extremity: change in color, persistent  numbness, tingling, coldness or increase pain  · Any questions            What to do at Home:  Recommended activity: Activity as tolerated, NO DRIVING FOR 12 Hours post injection          *  Please give a list of your current medications to your Primary Care Provider. *  Please update this list whenever your medications are discontinued, doses are      changed, or new medications (including over-the-counter products) are added. *  Please carry medication information at all times in case of emergency situations. These are general instructions for a healthy lifestyle:    No smoking/ No tobacco products/ Avoid exposure to second hand smoke    Surgeon General's Warning:  Quitting smoking now greatly reduces serious risk to your health. Obesity, smoking, and sedentary lifestyle greatly increases your risk for illness    A healthy diet, regular physical exercise & weight monitoring are important for maintaining a healthy lifestyle    You may be retaining fluid if you have a history of heart failure or if you experience any of the following symptoms:  Weight gain of 3 pounds or more overnight or 5 pounds in a week, increased swelling in our hands or feet or shortness of breath while lying flat in bed.   Please call your doctor as soon as you notice any of these symptoms; do not wait until your next office visit. Recognize signs and symptoms of STROKE:    F-face looks uneven    A-arms unable to move or move unevenly    S-speech slurred or non-existent    T-time-call 911 as soon as signs and symptoms begin-DO NOT go       Back to bed or wait to see if you get better-TIME IS BRAIN.

## 2019-03-30 LAB
HCV GENOTYPE: NORMAL
HCV RNA SERPL NAA+PROBE-ACNC: NORMAL IU/ML
HCV RNA SERPL NAA+PROBE-LOG IU: NORMAL LOG10 IU/ML
TEST INFORMATION, 550045: NORMAL

## 2019-06-11 ENCOUNTER — OFFICE VISIT (OUTPATIENT)
Dept: ORTHOPEDIC SURGERY | Age: 59
End: 2019-06-11

## 2019-06-11 VITALS
DIASTOLIC BLOOD PRESSURE: 82 MMHG | OXYGEN SATURATION: 98 % | WEIGHT: 175 LBS | SYSTOLIC BLOOD PRESSURE: 128 MMHG | HEIGHT: 66 IN | TEMPERATURE: 98.4 F | HEART RATE: 71 BPM | BODY MASS INDEX: 28.12 KG/M2

## 2019-06-11 DIAGNOSIS — Z79.891 LONG-TERM CURRENT USE OF OPIATE ANALGESIC: ICD-10-CM

## 2019-06-11 DIAGNOSIS — M54.5 CHRONIC LOW BACK PAIN, UNSPECIFIED BACK PAIN LATERALITY, WITH SCIATICA PRESENCE UNSPECIFIED: ICD-10-CM

## 2019-06-11 DIAGNOSIS — G89.29 CHRONIC LOW BACK PAIN, UNSPECIFIED BACK PAIN LATERALITY, WITH SCIATICA PRESENCE UNSPECIFIED: ICD-10-CM

## 2019-06-11 DIAGNOSIS — M48.062 LUMBAR STENOSIS WITH NEUROGENIC CLAUDICATION: ICD-10-CM

## 2019-06-11 DIAGNOSIS — M48.062 LUMBAR STENOSIS WITH NEUROGENIC CLAUDICATION: Primary | ICD-10-CM

## 2019-06-11 RX ORDER — OXYCODONE HYDROCHLORIDE 5 MG/1
5 TABLET ORAL
Qty: 60 TAB | Refills: 0 | Status: SHIPPED | OUTPATIENT
Start: 2019-06-11 | End: 2019-07-11

## 2019-06-11 RX ORDER — CYCLOBENZAPRINE HCL 10 MG
10 TABLET ORAL
Qty: 90 TAB | Refills: 0 | Status: SHIPPED | OUTPATIENT
Start: 2019-06-11 | End: 2020-06-02 | Stop reason: ALTCHOICE

## 2019-06-11 NOTE — PROGRESS NOTES
Chief complaint/History of Present Illness:  No chief complaint on file. BEA Day is a  59 y.o.  male      HISTORY OF PRESENT ILLNESS:  The patient comes in today for followup of his chronic low back pain due to spinal stenosis. He underwent a lumbar epidural at L3-4 on March 26, 2019. He states it did help his back and legs, but it is wearing off now, and he would like a repeat one when he can have it. He is also status post ACDF on May 23, 2016, at C3-4 and C6-7 with a C5 corpectomy. He states his neck is doing okay. He just has numbness at the incision site. He also underwent evacuation retropharyngeal hematoma. He states he is swallowing okay, but he is having low back pain with left greater than right leg pain down to his calves and sometimes to his feet. He is taking Oxycodone 5 mg twice a day. His last dose was yesterday at 10:00 a.m. It brings his pain from a 10/10 down to a 4/10. It enables him to do activities he would not otherwise be able to do. He also takes Flexeril 5 mg, usually one-half tablet prn. He needs a refill of that also. He has failed Cymbalta, Lyrica, Gabapentin, and Topamax. He does not wish to try any other antineuritics. He is on Social Security disability. He is a nonsmoker. PHYSICAL EXAM:  Mr. Liz Blanca is a 58-year-old male. He is alert and oriented. He has a normal mood and affect. He has a full weightbearing, non-antalgic gait using no assistive device. He has 4/5 strength of the bilateral lower extremities and negative straight leg raise. He has no pain with hyperextension of the lumbar spine. ASSESSMENT/PLAN:  This is a patient with lumbar spinal stenosis. He is still not ready to have any surgery for that, but he would like a repeat L3-4 lumbar epidural.  I told him that I would prefer to wait at least until the very end of this month or the end of next month, but he wants to go ahead and schedule it and set it up for those times.   He is not taking the Topamax like I asked him too. He states it does not help. His  is appropriate. His ORT score is 0. His MME is 15. A UDS is being done today. We will give him one refill of Oxycodone. Further refills are dependent upon the results of the UDS. We will see him back in three months or sooner if needed.         Review of systems:    Past Medical History:   Diagnosis Date    Hypertension     Liver disease     Hepatitis C    Low back pain     Lumbar stenosis     L3-L4, L4-L5    Migraine headache     Numbness and tingling in left hand     Radiculopathy     Right L4-L5    Right leg pain     Sensation of cold in leg     Radiating into right posterior buttock and anterior thigh    Unsteady gait      Past Surgical History:   Procedure Laterality Date    COLONOSCOPY N/A 11/6/2018    COLONOSCOPY, SCREENING /c Bx Polypectomy /c Hot Snared Polypectomy performed by Jose Bragg MD at [address] N/A 11/9/2018    SIGMOIDOSCOPY FLEXIBLE: ENDO CLIP APPLICATION performed by Jose Bragg MD at [address] CERVICAL FUSION  05/23/2016    ACDF C3/4 C4/5 C5/6 C6/7, Dr. Schreiber Snipe  05/25/16    hematoma removal from cervical spine     Social History     Socioeconomic History    Marital status:      Spouse name: Not on file    Number of children: Not on file    Years of education: Not on file    Highest education level: Not on file   Occupational History    Not on file   Social Needs    Financial resource strain: Not on file    Food insecurity:     Worry: Not on file     Inability: Not on file    Transportation needs:     Medical: Not on file     Non-medical: Not on file   Tobacco Use    Smoking status: Former Smoker     Packs/day: 0.50     Years: 40.00     Pack years: 20.00     Types: Cigarettes, Pipe     Last attempt to quit: 4/17/2016     Years since quitting: 3.1    Smokeless tobacco: Never Used Substance and Sexual Activity    Alcohol use: Yes     Alcohol/week: 2.4 oz     Types: 4 Glasses of wine per week     Comment: Daily- wine and beer    Drug use: No    Sexual activity: Yes     Partners: Female     Birth control/protection: None   Lifestyle    Physical activity:     Days per week: Not on file     Minutes per session: Not on file    Stress: Not on file   Relationships    Social connections:     Talks on phone: Not on file     Gets together: Not on file     Attends Baptism service: Not on file     Active member of club or organization: Not on file     Attends meetings of clubs or organizations: Not on file     Relationship status: Not on file    Intimate partner violence:     Fear of current or ex partner: Not on file     Emotionally abused: Not on file     Physically abused: Not on file     Forced sexual activity: Not on file   Other Topics Concern    Not on file   Social History Narrative    Not on file     Family History   Problem Relation Age of Onset    Hypertension Mother     Heart Disease Mother     Diabetes Mother     Hypertension Father        Physical Exam:  Visit Vitals  /82 (BP 1 Location: Left arm, BP Patient Position: Sitting)   Pulse 71   Temp 98.4 °F (36.9 °C) (Oral)   Ht 5' 6\" (1.676 m)   Wt 175 lb (79.4 kg)   SpO2 98%   BMI 28.25 kg/m²     Pain Scale: 8/10    Least pain over the last week has been 4/10. Worst pain over the last week has been 10/10. Opioid Risk Tool Reviewed: YES, Score 0  Aberrant behaviors: None. Urine Drug Screen: today. Controlled substance agreement on file: yes.  reviewed:yes  Pill count is consistent with his prescription: n/a  Concomitant use of a benzodiazepine: no  MME 15  Narcan not warranted        Risks and benefits of ongoing opiate therapy have been reviewed with the patient. No pain behaviors. Denies thoughts of harming self or others.  Pt has a good risk to benefit ratio which allows the pt to function in a home environment without side effects        has been . reviewed and is appropriate    Pt has a consistent UDS in the record      Diagnoses and all orders for this visit:    1. Lumbar stenosis with neurogenic claudication  -     DRUG SCREEN UR - W/ CONFIRM; Future  -     oxyCODONE IR (ROXICODONE) 5 mg immediate release tablet; Take 1 Tab by mouth two (2) times daily as needed for Pain for up to 30 days. Max Daily Amount: 10 mg. Take 1 tab po 1-2 times a day as needed for pain  Indications: chronic pain  -     SCHEDULE SURGERY    2. Chronic low back pain, unspecified back pain laterality, with sciatica presence unspecified  -     DRUG SCREEN UR - W/ CONFIRM; Future  -     cyclobenzaprine (FLEXERIL) 10 mg tablet; Take 1 Tab by mouth three (3) times daily as needed for Muscle Spasm(s). 3. Long-term current use of opiate analgesic  -     DRUG SCREEN UR - W/ CONFIRM; Future            Follow-up and Dispositions    · Return in about 3 months (around 9/11/2019) for with NP.              We have informed Anna Núñez to notify us for immediate appointment if he has any worsening neurogical symptoms or if an emergency situation presents, then call [phone]

## 2019-06-17 ENCOUNTER — DOCUMENTATION ONLY (OUTPATIENT)
Dept: ORTHOPEDIC SURGERY | Age: 59
End: 2019-06-17

## 2019-06-21 NOTE — PROGRESS NOTES
06/21/2019 Patient called ref the lab results and to get his medication. Please call him back at [phone].

## 2019-06-25 ENCOUNTER — OFFICE VISIT (OUTPATIENT)
Dept: ORTHOPEDIC SURGERY | Age: 59
End: 2019-06-25

## 2019-06-25 VITALS
OXYGEN SATURATION: 99 % | DIASTOLIC BLOOD PRESSURE: 82 MMHG | HEART RATE: 86 BPM | SYSTOLIC BLOOD PRESSURE: 138 MMHG | BODY MASS INDEX: 28.28 KG/M2 | RESPIRATION RATE: 18 BRPM | TEMPERATURE: 98.5 F | HEIGHT: 66 IN | WEIGHT: 176 LBS

## 2019-06-25 DIAGNOSIS — Z79.891 LONG-TERM CURRENT USE OF OPIATE ANALGESIC: ICD-10-CM

## 2019-06-25 DIAGNOSIS — M54.5 CHRONIC LOW BACK PAIN, UNSPECIFIED BACK PAIN LATERALITY, WITH SCIATICA PRESENCE UNSPECIFIED: ICD-10-CM

## 2019-06-25 DIAGNOSIS — M48.062 LUMBAR STENOSIS WITH NEUROGENIC CLAUDICATION: Primary | ICD-10-CM

## 2019-06-25 DIAGNOSIS — M48.062 LUMBAR STENOSIS WITH NEUROGENIC CLAUDICATION: ICD-10-CM

## 2019-06-25 DIAGNOSIS — G89.29 CHRONIC LOW BACK PAIN, UNSPECIFIED BACK PAIN LATERALITY, WITH SCIATICA PRESENCE UNSPECIFIED: ICD-10-CM

## 2019-06-25 NOTE — PROGRESS NOTES
Chief complaint/History of Present Illness:  Chief Complaint   Patient presents with    Back Pain     f/u     HPI  Orlando Fonseca. is a  59 y.o.  male      HISTORY OF PRESENT ILLNESS:  Progress note. The patient comes in today to discuss his last UDS that was done at the last visit on 06/11/2019. He had reported he had taken his oxycodone 5 mg that he normally takes twice a day. He took the last dose the night before. It did not show up on his urine drug screen neither parent drug or the metabolite. He is now wondering if it is because he only took half a tab that night. We have not had an inconsistent UDS with him in the last 3 years. He only takes oxycodone 5 mg twice a day. He also takes Flexeril 5 mg as needed. He has tried multiple antineuritics including Cymbalta, Lyrica, gabapentin, and Topamax without any help. He has had lumbar epidurals that have helped. Last visit he did not set up to have an epidural because he had just had one in 03/2019, so we were trying to hold off on that. He has also had neck pain in the past and is status post ACDF C3-4 and C6-7 with C5 corpectomy that 05/23/2016. His neck is doing okay, but he does have the chronic low back pain that radiates into his left greater than right lower extremity down to the calves and sometimes to his feet. He is on Social Security Disability. He is a nonsmoker. He denies fever, bowel or bladder dysfunction. He states he last took a whole oxycodone last night. He did not want to take one this morning because he was driving here. PHYSICAL EXAMINATION:  Mr. Sweta Davison is a 23-year-old male. He is alert and oriented. Normal mood and affect. He has a full weightbearing nonantalgic gait. No assistive device. He has 4/5 strength bilateral lower extremities. Negative straight leg raise. ASSESSMENT/PLAN:  This is a patient with lumbar spinal stenosis. We have brought him in today to discuss his UDS.   We are going to repeat the UDS today. His last full tablet of Oxycodone was last night. His  is appropriate. ORT score is 0. MME is 15. He will call us back in about 10 days to get the results of this UDS. If it is consistent, we can give him further refills of his oxycodone. If not, then I told him we may have to send him to pain management. We will see him back at his next scheduled appointment.             Review of systems:    Past Medical History:   Diagnosis Date    Hypertension     Liver disease     Hepatitis C    Low back pain     Lumbar stenosis     L3-L4, L4-L5    Migraine headache     Numbness and tingling in left hand     Radiculopathy     Right L4-L5    Right leg pain     Sensation of cold in leg     Radiating into right posterior buttock and anterior thigh    Unsteady gait      Past Surgical History:   Procedure Laterality Date    COLONOSCOPY N/A 11/6/2018    COLONOSCOPY, SCREENING /c Bx Polypectomy /c Hot Snared Polypectomy performed by Jorge Nolasco MD at [address] N/A 11/9/2018    SIGMOIDOSCOPY FLEXIBLE: ENDO CLIP APPLICATION performed by Jorge Nolasco MD at [address] [place] [address]  05/23/2016    ACDF C3/4 C4/5 C5/6 C6/7, Dr. Hylton Matters  05/25/16    hematoma removal from cervical spine     Social History     Socioeconomic History    Marital status:      Spouse name: Not on file    Number of children: Not on file    Years of education: Not on file    Highest education level: Not on file   Occupational History    Not on file   Social Needs    Financial resource strain: Not on file    Food insecurity:     Worry: Not on file     Inability: Not on file    Transportation needs:     Medical: Not on file     Non-medical: Not on file   Tobacco Use    Smoking status: Former Smoker     Packs/day: 0.50     Years: 40.00     Pack years: 20.00     Types: Cigarettes, Pipe     Last attempt to quit: 4/17/2016 Years since quitting: 3.1    Smokeless tobacco: Never Used   Substance and Sexual Activity    Alcohol use: Yes     Alcohol/week: 2.4 oz     Types: 4 Glasses of wine per week     Comment: Daily- wine and beer    Drug use: No    Sexual activity: Yes     Partners: Female     Birth control/protection: None   Lifestyle    Physical activity:     Days per week: Not on file     Minutes per session: Not on file    Stress: Not on file   Relationships    Social connections:     Talks on phone: Not on file     Gets together: Not on file     Attends Christianity service: Not on file     Active member of club or organization: Not on file     Attends meetings of clubs or organizations: Not on file     Relationship status: Not on file    Intimate partner violence:     Fear of current or ex partner: Not on file     Emotionally abused: Not on file     Physically abused: Not on file     Forced sexual activity: Not on file   Other Topics Concern    Not on file   Social History Narrative    Not on file     Family History   Problem Relation Age of Onset    Hypertension Mother     Heart Disease Mother     Diabetes Mother     Hypertension Father        Physical Exam:  Visit Vitals  /82   Pulse 86   Temp 98.5 °F (36.9 °C)   Resp 18   Ht 5' 6\" (1.676 m)   Wt 176 lb (79.8 kg)   SpO2 99%   BMI 28.41 kg/m²     Pain Scale: 8/10    Least pain over the last week has been 4/10. Worst pain over the last week has been 10/10. Opioid Risk Tool Reviewed: YES, Score 0  Aberrant behaviors: None. Urine Drug Screen: today. Controlled substance agreement on file: yes.  reviewed:yes  Pill count is consistent with his prescription: n/a  Concomitant use of a benzodiazepine: no  MME 15  Narcan not warranted        Risks and benefits of ongoing opiate therapy have been reviewed with the patient. No pain behaviors. Denies thoughts of harming self or others.  Pt has a good risk to benefit ratio which allows the pt to function in a home environment without side effects        has been . reviewed and is appropriate    Pt has a consistent UDS in the record      Diagnoses and all orders for this visit:    1. Lumbar stenosis with neurogenic claudication  -     DRUG SCREEN UR - W/ CONFIRM; Future    2. Chronic low back pain, unspecified back pain laterality, with sciatica presence unspecified  -     DRUG SCREEN UR - W/ CONFIRM; Future    3. Long-term current use of opiate analgesic  -     DRUG SCREEN UR - W/ CONFIRM; Future            Follow-up and Dispositions    · Return for keep 9/16/19 appt.              We have informed Chris Zepeda. to notify us for immediate appointment if he has any worsening neurogical symptoms or if an emergency situation presents, then call [phone]

## 2019-07-02 ENCOUNTER — TELEPHONE (OUTPATIENT)
Dept: ORTHOPEDIC SURGERY | Age: 59
End: 2019-07-02

## 2019-07-02 DIAGNOSIS — Z79.891 LONG-TERM CURRENT USE OF OPIATE ANALGESIC: ICD-10-CM

## 2019-07-02 DIAGNOSIS — M48.062 LUMBAR STENOSIS WITH NEUROGENIC CLAUDICATION: Primary | ICD-10-CM

## 2019-07-02 NOTE — TELEPHONE ENCOUNTER
Patient called asking if his results from is drug screening is available, if so is he able to get his prescription for oxyCODONE IR (ROXICODONE) 5 mg immediate release tablet. Please advise patient at [phone].

## 2019-07-08 NOTE — TELEPHONE ENCOUNTER
I called and spoke with the patient (confirmed his ). I informed him that the last UDS was also inconsistent and did not show any of the opioid (parent or metabolite) in his system and that since this was the second inconsistent UDS we were not able to give any further opioids. He verbalized understanding and then asked if he could still get his block. I informed him that he was able to get the block. I offered pain management referral and he stated he would like a referral to pain management. Please put in referral to pain management. Please make sure they take his insurance.

## 2019-07-23 ENCOUNTER — TELEPHONE (OUTPATIENT)
Dept: ORTHOPEDIC SURGERY | Age: 59
End: 2019-07-23

## 2019-07-23 NOTE — TELEPHONE ENCOUNTER
We cannot give him any pain medication due to his UDS results.   He can try his PCP to see if they can do anything

## 2019-07-23 NOTE — TELEPHONE ENCOUNTER
Patient has been scheduled at pain management, however, it doesn't begin until November and he needs to know \"what he's supposed to do in the meantime\" as it's a long time until then. Patient is requesting a call back to discuss at [phone].

## 2019-07-24 NOTE — TELEPHONE ENCOUNTER
Returned call to patient, verified Name/, informed patient of LEONIDAS Cardona's message. Patient asked \"Then what am I supposed to do? \"    Informed patient, it would be up to the PCP, or wait until he gets in with Pain Management. Phone call was ended on other end, no response was received. No further action required at this time.

## 2019-07-24 NOTE — TELEPHONE ENCOUNTER
Patient called back and I advised him of the message below and he said his PCP is not able to prescribe him medication. He is wondering if there is a way to get medication from this office.  Please advise patient at [phone]

## 2019-08-26 NOTE — H&P
Office Visit     6/25/2019  [facility] MEGAN Brand NP   Nurse Practitioner   Lumbar stenosis with neurogenic claudication +2 more   Dx   Back Pain     Reason for Visit    Progress Notes        Chief complaint/History of Present Illness:       Chief Complaint   Patient presents with    Back Pain       f/u      HPI  Douglas Rowe. is a  59 y.o.  male       HISTORY OF PRESENT ILLNESS:  Progress note. The patient comes in today to discuss his last UDS that was done at the last visit on 06/11/2019. He had reported he had taken his oxycodone 5 mg that he normally takes twice a day. He took the last dose the night before. It did not show up on his urine drug screen neither parent drug or the metabolite. He is now wondering if it is because he only took half a tab that night. We have not had an inconsistent UDS with him in the last 3 years. He only takes oxycodone 5 mg twice a day. He also takes Flexeril 5 mg as needed. He has tried multiple antineuritics including Cymbalta, Lyrica, gabapentin, and Topamax without any help. He has had lumbar epidurals that have helped. Last visit he did not set up to have an epidural because he had just had one in 03/2019, so we were trying to hold off on that. He has also had neck pain in the past and is status post ACDF C3-4 and C6-7 with C5 corpectomy that 05/23/2016. His neck is doing okay, but he does have the chronic low back pain that radiates into his left greater than right lower extremity down to the calves and sometimes to his feet. He is on Social Security Disability. He is a nonsmoker. He denies fever, bowel or bladder dysfunction. He states he last took a whole oxycodone last night. He did not want to take one this morning because he was driving here.          PHYSICAL EXAMINATION:  Mr. Debbie Chapa is a 69-year-old male. He is alert and oriented. Normal mood and affect. He has a full weightbearing nonantalgic gait. No assistive device. He has 4/5 strength bilateral lower extremities. Negative straight leg raise.        ASSESSMENT/PLAN:  This is a patient with lumbar spinal stenosis. We have brought him in today to discuss his UDS. We are going to repeat the UDS today. His last full tablet of Oxycodone was last night. His  is appropriate. ORT score is 0. MME is 15. He will call us back in about 10 days to get the results of this UDS. If it is consistent, we can give him further refills of his oxycodone. If not, then I told him we may have to send him to pain management.   We will see him back at his next scheduled appointment.               Review of systems:          Past Medical History:   Diagnosis Date    Hypertension      Liver disease       Hepatitis C    Low back pain      Lumbar stenosis       L3-L4, L4-L5    Migraine headache      Numbness and tingling in left hand      Radiculopathy       Right L4-L5    Right leg pain      Sensation of cold in leg       Radiating into right posterior buttock and anterior thigh    Unsteady gait              Past Surgical History:   Procedure Laterality Date    COLONOSCOPY N/A 11/6/2018     COLONOSCOPY, SCREENING /c Bx Polypectomy /c Hot Snared Polypectomy performed by Jessi Miner MD at [address] N/A 11/9/2018     SIGMOIDOSCOPY FLEXIBLE: ENDO CLIP APPLICATION performed by [facility]i Miner MD at [facility] OR    HX [address] [place] [address]   05/23/2016     ACDF C3/4 C4/5 C5/6 C6/7, Dr. Luz Chen   05/25/16     hematoma removal from cervical spine      Social History            Socioeconomic History    Marital status:        Spouse name: Not on file    Number of children: Not on file    Years of education: Not on file    Highest education level: Not on file   Occupational History    Not on file   Social Needs    Financial resource strain: Not on file    Food insecurity:       Worry: Not on file       Inability: Not on file    Transportation needs:       Medical: Not on file       Non-medical: Not on file   Tobacco Use    Smoking status: Former Smoker       Packs/day: 0.50       Years: 40.00       Pack years: 20.00       Types: Cigarettes, Pipe       Last attempt to quit: 4/17/2016       Years since quitting: 3.1    Smokeless tobacco: Never Used   Substance and Sexual Activity    Alcohol use: Yes       Alcohol/week: 2.4 oz       Types: 4 Glasses of wine per week       Comment: Daily- wine and beer    Drug use: No    Sexual activity: Yes       Partners: Female       Birth control/protection: None   Lifestyle    Physical activity:       Days per week: Not on file       Minutes per session: Not on file    Stress: Not on file   Relationships    Social connections:       Talks on phone: Not on file       Gets together: Not on file       Attends Christian service: Not on file       Active member of club or organization: Not on file       Attends meetings of clubs or organizations: Not on file       Relationship status: Not on file    Intimate partner violence:       Fear of current or ex partner: Not on file       Emotionally abused: Not on file       Physically abused: Not on file       Forced sexual activity: Not on file   Other Topics Concern    Not on file   Social History Narrative    Not on file            Family History   Problem Relation Age of Onset    Hypertension Mother      Heart Disease Mother      Diabetes Mother      Hypertension Father           Physical Exam:  Visit Vitals  /82   Pulse 86   Temp 98.5 °F (36.9 °C)   Resp 18   Ht 5' 6\" (1.676 m)   Wt 176 lb (79.8 kg)   SpO2 99%   BMI 28.41 kg/m²      Pain Scale: 8/10     Least pain over the last week has been 4/10. Worst pain over the last week has been 10/10. Opioid Risk Tool Reviewed: YES, Score 0  Aberrant behaviors: None.    Urine Drug Screen: today.   Controlled substance agreement on file: yes.     reviewed:yes  Pill count is consistent with his prescription: n/a  Concomitant use of a benzodiazepine: no  MME 15  Narcan not warranted         Risks and benefits of ongoing opiate therapy have been reviewed with the patient.  No pain behaviors. Denies thoughts of harming self or others. Pt has a good risk to benefit ratio which allows the pt to function in a home environment without side effects         has been . reviewed and is appropriate     Pt has a consistent UDS in the record        Diagnoses and all orders for this visit:     1. Lumbar stenosis with neurogenic claudication  -     DRUG SCREEN UR - W/ CONFIRM; Future     2. Chronic low back pain, unspecified back pain laterality, with sciatica presence unspecified  -     DRUG SCREEN UR - W/ CONFIRM; Future     3. Long-term current use of opiate analgesic  -     DRUG SCREEN UR - W/ CONFIRM; Future                 Follow-up and Dispositions  ·   Return for keep 9/16/19 appt.                 We have informed Carol Ann Hernandez to notify us for immediate appointment if he has any worsening neurogical symptoms or if an emergency situation presents, then call 911      Note Details   Instructions         Return for keep 9/16/19 appt.    Additional Documentation     Vitals:    /82    Pulse 86    Temp 98.5 °F (36.9 °C)    Resp 18    Ht 5' 6\" (1.676 m)    Wt 176 lb (79.8 kg)    SpO2 99%    BMI 28.41 kg/m²    BSA 1.93 m²    Pain Sc   8 (Loc: Back)    Encounter Info:    Billing Info,    History,    Allergies,    Detailed Report       Media     Scan on 7/17/2019 1322 by Hailey Gonzalez: Internal Documents/Blue Sheet (Scheduling Instructions)/[facility]/Amie [place] NP/[id number] on 7/17/2019 1322 by Hailey Gonzalez: Internal Documents/Blue Sheet (Scheduling Instructions)/[facility]/Amie [place] NP/06-25-19    BestPractice Advisories     Click to view BestPractice Advisory history   Encounter Messages No messages in this encounter   Orders Placed         DRUG SCREEN UR - W/ CONFIRM   Medication Changes        None      Medication List    Visit Diagnoses         Lumbar stenosis with neurogenic claudication      Chronic low back pain, unspecified back pain laterality, with sciatica presence unspecified      Long-term current use of opiate analgesic      Problem List

## 2019-08-27 ENCOUNTER — HOSPITAL ENCOUNTER (OUTPATIENT)
Age: 59
Setting detail: OUTPATIENT SURGERY
Discharge: HOME | End: 2019-08-27
Attending: PHYSICAL MEDICINE & REHABILITATION | Admitting: PHYSICAL MEDICINE & REHABILITATION
Payer: MEDICARE

## 2019-08-27 ENCOUNTER — APPOINTMENT (OUTPATIENT)
Dept: GENERAL RADIOLOGY | Age: 59
End: 2019-08-27
Attending: PHYSICAL MEDICINE & REHABILITATION
Payer: MEDICARE

## 2019-08-27 VITALS
DIASTOLIC BLOOD PRESSURE: 96 MMHG | SYSTOLIC BLOOD PRESSURE: 161 MMHG | HEIGHT: 66 IN | RESPIRATION RATE: 16 BRPM | WEIGHT: 176 LBS | OXYGEN SATURATION: 100 % | HEART RATE: 79 BPM | BODY MASS INDEX: 28.28 KG/M2 | TEMPERATURE: 98.6 F

## 2019-08-27 PROCEDURE — 74011636320 HC RX REV CODE- 636/320: Performed by: PHYSICAL MEDICINE & REHABILITATION

## 2019-08-27 PROCEDURE — 74011250636 HC RX REV CODE- 250/636: Performed by: PHYSICAL MEDICINE & REHABILITATION

## 2019-08-27 PROCEDURE — 74011250637 HC RX REV CODE- 250/637: Performed by: PHYSICAL MEDICINE & REHABILITATION

## 2019-08-27 PROCEDURE — 76010000009 HC PAIN MGT 0 TO 30 MIN PROC: Performed by: PHYSICAL MEDICINE & REHABILITATION

## 2019-08-27 PROCEDURE — 77030014124 HC TY EPDRL BBMI -A: Performed by: PHYSICAL MEDICINE & REHABILITATION

## 2019-08-27 RX ORDER — DEXAMETHASONE SODIUM PHOSPHATE 100 MG/10ML
INJECTION INTRAMUSCULAR; INTRAVENOUS AS NEEDED
Status: DISCONTINUED | OUTPATIENT
Start: 2019-08-27 | End: 2019-08-27 | Stop reason: HOSPADM

## 2019-08-27 RX ORDER — DIAZEPAM 5 MG/1
5-20 TABLET ORAL ONCE
Status: COMPLETED | OUTPATIENT
Start: 2019-08-27 | End: 2019-08-27

## 2019-08-27 RX ORDER — LIDOCAINE HYDROCHLORIDE 10 MG/ML
INJECTION, SOLUTION EPIDURAL; INFILTRATION; INTRACAUDAL; PERINEURAL AS NEEDED
Status: DISCONTINUED | OUTPATIENT
Start: 2019-08-27 | End: 2019-08-27 | Stop reason: HOSPADM

## 2019-08-27 RX ADMIN — DIAZEPAM 10 MG: 5 TABLET ORAL at 10:10

## 2019-08-27 NOTE — PROCEDURES
Intralaminar Epidural Steroid Procedure Note        Patient Name   Rei Virk. Date of Procedure: August 27, 2019  Preoperative Diagnosis: Lumbar spinal stenosis  Postoperative Diagnosis: Same  Location [facility], [address]      Procedure:  Epidural Steroid Injection    Consent:  Informed consent was obtained prior to the procedure. In addition to the potential risks associated with the procedure itself, the patient was informed both verbally and in writing of the potential side effects of the use of glucocorticoid. The patient appeared to comprehend the informed consent and desired to have the procedure performed. Procedure in Detail:  The patient was taken to the procedure suite and placed in the prone position on the operating table on appropriate padding. The posterior lumbar region was prepped and draped in the usual sterile fashion. Intraoperative fluoroscopy was used to localize the L3-L4 interspace. The skin was infiltrated with 1% lidocaine. An 18-gauge Tuohy needle was advanced into the epidural space at L3-L4 under fluoroscopic guidance using the loss of resistance technique. No cerebrospinal fluid was seen throughout the procedure. Yes  A small amount of Isovue was injected into the epidural space, confirming appropriated needle placement on fluoroscopy. No vascular uptake was identified. Next, 2ml of 1% Lidocaine and 10mg of preservative free Dexamethasone were injected via the Tuohy needle. The needle was removed from the patient. The patient tolerated the procedure well and was discharged home with designated  and care instructions.       Signed By: Vikki Sanchez MD                        August 27, 2019

## 2019-08-27 NOTE — DISCHARGE INSTRUCTIONS
[facility]   ([place])  Dr. Jeremias James, Dr. Jorge Yan, Dr. Kendall Swenson not drive a car, operate heavy machinery or dangerous equipment for 24 hours. * Activity as tolerated; rest for the remainder of the day. * Resume pre-block medications including those for your family doctor. * Do not drink alcoholic beverages for 24 hours. Alcohol and the medications you have received may interact and cause an adverse reaction. * You may feel better this evening and worse tomorrow, as the numbing medications wears off and the steroid has yet to begin to work. After 48 hrs the steroid should begin to release bringing you relief. * You may shower this evening and remove any bandages. * Avoid hot tubs and heating pads for 24 hours. You may use cold packs on the procedure site as tolerated for the first 24 hours. * If a headache develops, drink plenty of fluids and rest.  Take over the counter medications for headache if needed. If the headache continues longer than 24 hours, call MD at the [address] [place] [address]. [phone]    * Continue taking pain medications as needed. * You may resume your regular diet if tolerated. Otherwise, start with sips of water and advance slowly. * If Diabetic: check your blood sugar three times a day for the next 3 days. If your sugar is greater than 300 call your family doctor. If your sugar is greater than 400, have someone transport you to the nearest Emergency Room. * If you experience any of the following problems, Please Call the [address] [place] [address] at [phone].         * Shortness of Breath    * Fever of 101 or higher    * Nausea / Vomiting    * Severe Headache    * Weakness or numbness in arms or legs that is not      resolving    * Prolonged increase in pain greater than 4 days      DISCHARGE SUMMARY from Nurse      PATIENT INSTRUCTIONS:    After oral sedation, for 24 hours or while taking prescription Narcotics:  · Limit your activities  · Do not drive and operate hazardous machinery  · Do not make important personal or business decisions  · Do  not drink alcoholic beverages  · If you have not urinated within 8 hours after discharge, please contact your surgeon on call. Report the following to your surgeon:  · Excessive pain, swelling, redness or odor of or around the surgical area  · Temperature over 101  · Nausea and vomiting lasting longer than 4 hours or if unable to take medications  · Any signs of decreased circulation or nerve impairment to extremity: change in color, persistent  numbness, tingling, coldness or increase pain  · Any questions            What to do at Home:  Recommended activity: Activity as tolerated, NO DRIVING FOR 12 Hours post injection          *  Please give a list of your current medications to your Primary Care Provider. *  Please update this list whenever your medications are discontinued, doses are      changed, or new medications (including over-the-counter products) are added. *  Please carry medication information at all times in case of emergency situations. These are general instructions for a healthy lifestyle:    No smoking/ No tobacco products/ Avoid exposure to second hand smoke    Surgeon General's Warning:  Quitting smoking now greatly reduces serious risk to your health. Obesity, smoking, and sedentary lifestyle greatly increases your risk for illness    A healthy diet, regular physical exercise & weight monitoring are important for maintaining a healthy lifestyle    You may be retaining fluid if you have a history of heart failure or if you experience any of the following symptoms:  Weight gain of 3 pounds or more overnight or 5 pounds in a week, increased swelling in our hands or feet or shortness of breath while lying flat in bed.   Please call your doctor as soon as you notice any of these symptoms; do not wait until your next office visit. Recognize signs and symptoms of STROKE:    F-face looks uneven    A-arms unable to move or move unevenly    S-speech slurred or non-existent    T-time-call 911 as soon as signs and symptoms begin-DO NOT go       Back to bed or wait to see if you get better-TIME IS BRAIN.

## 2019-08-27 NOTE — INTERVAL H&P NOTE
H&P Update:  Jessica Reddy was seen and briefly examined. Last BO 5 months ago w/benefit. History and physical has been reviewed.  There have been no significant clinical changes since the completion of the originally dated History and Physical.

## 2019-10-30 ENCOUNTER — APPOINTMENT (OUTPATIENT)
Dept: GENERAL RADIOLOGY | Age: 59
End: 2019-10-30
Attending: EMERGENCY MEDICINE
Payer: MEDICARE

## 2019-10-30 ENCOUNTER — HOSPITAL ENCOUNTER (EMERGENCY)
Age: 59
Discharge: HOME | End: 2019-10-30
Attending: EMERGENCY MEDICINE
Payer: MEDICARE

## 2019-10-30 VITALS
DIASTOLIC BLOOD PRESSURE: 85 MMHG | TEMPERATURE: 98.4 F | HEIGHT: 66 IN | HEART RATE: 82 BPM | OXYGEN SATURATION: 98 % | RESPIRATION RATE: 18 BRPM | WEIGHT: 177 LBS | SYSTOLIC BLOOD PRESSURE: 133 MMHG | BODY MASS INDEX: 28.45 KG/M2

## 2019-10-30 DIAGNOSIS — M25.561 ACUTE PAIN OF RIGHT KNEE: Primary | ICD-10-CM

## 2019-10-30 PROCEDURE — 99282 EMERGENCY DEPT VISIT SF MDM: CPT

## 2019-10-30 PROCEDURE — 73564 X-RAY EXAM KNEE 4 OR MORE: CPT

## 2019-10-30 RX ORDER — TRAMADOL HYDROCHLORIDE 50 MG/1
50 TABLET ORAL
Qty: 12 TAB | Refills: 0 | Status: SHIPPED | OUTPATIENT
Start: 2019-10-30 | End: 2019-11-02

## 2019-10-30 NOTE — ED PROVIDER NOTES
EMERGENCY DEPARTMENT HISTORY AND PHYSICAL EXAM    Date: 10/30/2019  Patient Name: Lainey Pedroza. History of Presenting Illness     Chief Complaint   Patient presents with    Knee Pain         History Provided By: Patient    Additional History (Context): Lainey Benitez is a 62 y.o. male with hypertension, osteoarthritis and hepatitis C who presents with right knee pain for 1 week. Also had some swelling. Denies any injury number or weakness. Is been taking multiple over-the-counter medications ranging from Tylenol to ibuprofen and BC powders and topical meds without any relief of his symptoms. Denies a history of gout. Denies instability. PCP: Espinoza Lopez MD    Current Outpatient Medications   Medication Sig Dispense Refill    traMADol (ULTRAM) 50 mg tablet Take 1 Tab by mouth every six (6) hours as needed for Pain for up to 3 days. Max Daily Amount: 200 mg. 12 Tab 0    cyclobenzaprine (FLEXERIL) 10 mg tablet Take 1 Tab by mouth three (3) times daily as needed for Muscle Spasm(s). 90 Tab 0    lisinopril (PRINIVIL, ZESTRIL) 20 mg tablet Take 20 mg by mouth nightly.  naloxone (NARCAN) 4 mg/actuation nasal spray Use 1 spray intranasally, then discard. Repeat with new spray every 2 min as needed for opioid overdose symptoms, alternating nostrils. 1 Each 0    amLODIPine (NORVASC) 5 mg tablet Take 5 mg by mouth daily.  lisinopril-hydrochlorothiazide (PRINZIDE, ZESTORETIC) 20-25 mg per tablet Take 1 Tab by mouth daily.  90 Tab 3       Past History     Past Medical History:  Past Medical History:   Diagnosis Date    Hypertension     Liver disease     Hepatitis C    Low back pain     Lumbar stenosis     L3-L4, L4-L5    Migraine headache     Numbness and tingling in left hand     Radiculopathy     Right L4-L5    Right leg pain     Sensation of cold in leg     Radiating into right posterior buttock and anterior thigh    Unsteady gait        Past Surgical History:  Past Surgical History:   Procedure Laterality Date    COLONOSCOPY N/A 11/6/2018    COLONOSCOPY, SCREENING /c Bx Polypectomy /c Hot Snared Polypectomy performed by Rita Mansfield MD at [address] N/A 11/9/2018    SIGMOIDOSCOPY FLEXIBLE: ENDO CLIP APPLICATION performed by Rita Mansfield MD at [facility] OR    HX CERVICAL FUSION  05/23/2016    ACDF C3/4 C4/5 C5/6 C6/7, Dr. Jose Francisco Urban  05/25/16    hematoma removal from cervical spine       Family History:  Family History   Problem Relation Age of Onset    Hypertension Mother     Heart Disease Mother     Diabetes Mother     Hypertension Father        Social History:  Social History     Tobacco Use    Smoking status: Former Smoker     Packs/day: 0.50     Years: 40.00     Pack years: 20.00     Types: Cigarettes, Pipe     Last attempt to quit: 4/17/2016     Years since quitting: 3.5    Smokeless tobacco: Never Used   Substance Use Topics    Alcohol use: Yes     Alcohol/week: 4.0 standard drinks     Types: 4 Glasses of wine per week     Comment: Daily- wine and beer    Drug use: No       Allergies: Allergies   Allergen Reactions    Cymbalta [Duloxetine] Itching    Percocet [Oxycodone-Acetaminophen] Itching         Review of Systems   Review of Systems   Musculoskeletal: Positive for arthralgias and joint swelling. Neurological: Negative for weakness and numbness. All Other Systems Negative  Physical Exam     Vitals:    10/30/19 0730 10/30/19 0904   BP: 133/85    Pulse: 82    Resp: 18    Temp: 98.4 °F (36.9 °C)    SpO2: 98% 98%   Weight: 80.3 kg (177 lb)    Height: 5' 6\" (1.676 m)      Physical Exam   Constitutional: Vital signs are normal. He appears well-developed and well-nourished. He is active. Non-toxic appearance. He does not appear ill. No distress. HENT:   Head: Normocephalic and atraumatic. Neck: Normal range of motion. Neck supple. Carotid bruit is not present.  No tracheal deviation present. No thyromegaly present. Cardiovascular: Normal rate, regular rhythm and normal heart sounds. Exam reveals no gallop and no friction rub. No murmur heard. Pulmonary/Chest: Effort normal and breath sounds normal. No stridor. No respiratory distress. He has no wheezes. He has no rales. He exhibits no tenderness. Abdominal: Soft. He exhibits no distension and no mass. There is no tenderness. There is no rebound, no guarding and no CVA tenderness. Musculoskeletal: Normal range of motion.   :    Extension 0 flexion 120  Equivocal modified Tank's. No varus or valgus stressors. Negative Lockman and anterior drawer. Minimal effusion. There is posterior medial joint line tenderness to palpation. DP PT pulses palpable. Neurological: He is alert. Skin: Skin is warm, dry and intact. He is not diaphoretic. No pallor. Psychiatric: He has a normal mood and affect. His speech is normal and behavior is normal. Judgment and thought content normal.   Nursing note and vitals reviewed. Diagnostic Study Results     Labs -   No results found for this or any previous visit (from the past 12 hour(s)). Radiologic Studies -   XR KNEE RT MIN 4 V   Final Result   IMPRESSION:      No acute abnormalities. CT Results  (Last 48 hours)    None        CXR Results  (Last 48 hours)    None            Medical Decision Making   I am the first provider for this patient. I reviewed the vital signs, available nursing notes, past medical history, past surgical history, family history and social history. Vital Signs-Reviewed the patient's vital signs. Records Reviewed: Nursing Notes    Procedures:  Procedures    Provider Notes (Medical Decision Making): Nothing acute on his x-ray. There is no obvious deformity and he has posterior medial joint line tenderness. Will refer him to Ortho for follow-up. Treat his pain.     MED RECONCILIATION:  No current facility-administered medications for this encounter. Current Outpatient Medications   Medication Sig    traMADol (ULTRAM) 50 mg tablet Take 1 Tab by mouth every six (6) hours as needed for Pain for up to 3 days. Max Daily Amount: 200 mg.  cyclobenzaprine (FLEXERIL) 10 mg tablet Take 1 Tab by mouth three (3) times daily as needed for Muscle Spasm(s).  lisinopril (PRINIVIL, ZESTRIL) 20 mg tablet Take 20 mg by mouth nightly.  naloxone (NARCAN) 4 mg/actuation nasal spray Use 1 spray intranasally, then discard. Repeat with new spray every 2 min as needed for opioid overdose symptoms, alternating nostrils.  amLODIPine (NORVASC) 5 mg tablet Take 5 mg by mouth daily.  lisinopril-hydrochlorothiazide (PRINZIDE, ZESTORETIC) 20-25 mg per tablet Take 1 Tab by mouth daily. Disposition:  home    DISCHARGE NOTE:   9:12 AM    Pt has been reexamined. Patient has no new complaints, changes, or physical findings. Care plan outlined and precautions discussed. Results of x-rays were reviewed with the patient. All medications were reviewed with the patient; will d/c home with ultram. All of pt's questions and concerns were addressed. Patient was instructed and agrees to follow up with ortho, as well as to return to the ED upon further deterioration. Patient is ready to go home. Follow-up Information     Follow up With Specialties Details Why Contact Info    Oliver Boone MD Orthopedic Surgery Schedule an appointment as soon as possible for a visit in 1 day  [address] [facility] [address] [phone]      [facility] DEPT Emergency Medicine  If symptoms worsen return immediately [address]  [phone]          Current Discharge Medication List      START taking these medications    Details   traMADol (ULTRAM) 50 mg tablet Take 1 Tab by mouth every six (6) hours as needed for Pain for up to 3 days. Max Daily Amount: 200 mg.   Qty: 12 Tab, Refills: 0    Associated Diagnoses: Acute pain of right knee               Diagnosis     Clinical Impression:   1.  Acute pain of right knee

## 2019-10-30 NOTE — DISCHARGE INSTRUCTIONS
Patient Education        Joint Pain: Care Instructions  Your Care Instructions    Many people have small aches and pains from overuse or injury to muscles and joints. Joint injuries often happen during sports or recreation, work tasks, or projects around the home. An overuse injury can happen when you put too much stress on a joint or when you do an activity that stresses the joint over and over, such as using the computer or rowing a boat. You can take action at home to help your muscles and joints get better. You should feel better in 1 to 2 weeks, but it can take 3 months or more to heal completely. Follow-up care is a key part of your treatment and safety. Be sure to make and go to all appointments, and call your doctor if you are having problems. It's also a good idea to know your test results and keep a list of the medicines you take. How can you care for yourself at home? · Do not put weight on the injured joint for at least a day or two. · For the first day or two after an injury, do not take hot showers or baths, and do not use hot packs. The heat could make swelling worse. · Put ice or a cold pack on the sore joint for 10 to 20 minutes at a time. Try to do this every 1 to 2 hours for the next 3 days (when you are awake) or until the swelling goes down. Put a thin cloth between the ice and your skin. · Wrap the injury in an elastic bandage. Do not wrap it too tightly because this can cause more swelling. · Prop up the sore joint on a pillow when you ice it or anytime you sit or lie down during the next 3 days. Try to keep it above the level of your heart. This will help reduce swelling. · Take an over-the-counter pain medicine, such as acetaminophen (Tylenol), ibuprofen (Advil, Motrin), or naproxen (Aleve). Read and follow all instructions on the label. · After 1 or 2 days of rest, begin moving the joint gently.  While the joint is still healing, you can begin to exercise using activities that do not strain or hurt the painful joint. When should you call for help? Call your doctor now or seek immediate medical care if:    · You have signs of infection, such as:  ? Increased pain, swelling, warmth, and redness. ? Red streaks leading from the joint. ? A fever.    Watch closely for changes in your health, and be sure to contact your doctor if:    · Your movement or symptoms are not getting better after 1 to 2 weeks of home treatment. Where can you learn more? Go to http://zulma-susana.info/. Enter P205 in the search box to learn more about \"Joint Pain: Care Instructions. \"  Current as of: June 26, 2019  Content Version: 12.2  © 6215-2753 [employer]. Care instructions adapted under license by [employer] (which disclaims liability or warranty for this information). If you have questions about a medical condition or this instruction, always ask your healthcare professional. [place] [address] any warranty or liability for your use of this information. Patient Education        Knee Pain or Injury: Care Instructions  Your Care Instructions    Injuries are a common cause of knee problems. Sudden (acute) injuries may be caused by a direct blow to the knee. They can also be caused by abnormal twisting, bending, or falling on the knee. Pain, bruising, or swelling may be severe, and may start within minutes of the injury. Overuse is another cause of knee pain. Other causes are climbing stairs, kneeling, and other activities that use the knee. Everyday wear and tear, especially as you get older, also can cause knee pain. Rest, along with home treatment, often relieves pain and allows your knee to heal. If you have a serious knee injury, you may need tests and treatment. Follow-up care is a key part of your treatment and safety. Be sure to make and go to all appointments, and call your doctor if you are having problems.  It's also a good idea to know your test results and keep a list of the medicines you take. How can you care for yourself at home? · Be safe with medicines. Read and follow all instructions on the label. ? If the doctor gave you a prescription medicine for pain, take it as prescribed. ? If you are not taking a prescription pain medicine, ask your doctor if you can take an over-the-counter medicine. · Rest and protect your knee. Take a break from any activity that may cause pain. · Put ice or a cold pack on your knee for 10 to 20 minutes at a time. Put a thin cloth between the ice and your skin. · Prop up a sore knee on a pillow when you ice it or anytime you sit or lie down for the next 3 days. Try to keep it above the level of your heart. This will help reduce swelling. · If your knee is not swollen, you can put moist heat, a heating pad, or a warm cloth on your knee. · If your doctor recommends an elastic bandage, sleeve, or other type of support for your knee, wear it as directed. · Follow your doctor's instructions about how much weight you can put on your leg. Use a cane, crutches, or a walker as instructed. · Follow your doctor's instructions about activity during your healing process. If you can do mild exercise, slowly increase your activity. · Reach and stay at a healthy weight. Extra weight can strain the joints, especially the knees and hips, and make the pain worse. Losing even a few pounds may help. When should you call for help? Call 911 anytime you think you may need emergency care. For example, call if:    · You have symptoms of a blood clot in your lung (called a pulmonary embolism). These may include:  ? Sudden chest pain. ? Trouble breathing. ?  Coughing up blood.    Call your doctor now or seek immediate medical care if:    · You have severe or increasing pain.     · Your leg or foot turns cold or changes color.     · You cannot stand or put weight on your knee.     · Your knee looks twisted or bent out of shape.     · You cannot move your knee.     · You have signs of infection, such as:  ? Increased pain, swelling, warmth, or redness. ? Red streaks leading from the knee. ? Pus draining from a place on your knee. ? A fever.     · You have signs of a blood clot in your leg (called a deep vein thrombosis), such as:  ? Pain in your calf, back of the knee, thigh, or groin. ? Redness and swelling in your leg or groin.    Watch closely for changes in your health, and be sure to contact your doctor if:    · You have tingling, weakness, or numbness in your knee.     · You have any new symptoms, such as swelling.     · You have bruises from a knee injury that last longer than 2 weeks.     · You do not get better as expected. Where can you learn more? Go to http://zulma-susana.info/. Enter K195 in the search box to learn more about \"Knee Pain or Injury: Care Instructions. \"  Current as of: June 26, 2019  Content Version: 12.2  © 6604-7930 [employer]. Care instructions adapted under license by [employer] (which disclaims liability or warranty for this information). If you have questions about a medical condition or this instruction, always ask your healthcare professional. [address] any warranty or liability for your use of this information.

## 2019-10-31 ENCOUNTER — OFFICE VISIT (OUTPATIENT)
Dept: ORTHOPEDIC SURGERY | Facility: CLINIC | Age: 59
End: 2019-10-31

## 2019-10-31 VITALS
BODY MASS INDEX: 29.02 KG/M2 | RESPIRATION RATE: 18 BRPM | SYSTOLIC BLOOD PRESSURE: 128 MMHG | OXYGEN SATURATION: 98 % | WEIGHT: 180.6 LBS | HEIGHT: 66 IN | HEART RATE: 88 BPM | TEMPERATURE: 98.1 F | DIASTOLIC BLOOD PRESSURE: 84 MMHG

## 2019-10-31 DIAGNOSIS — M70.61 TROCHANTERIC BURSITIS OF RIGHT HIP: Primary | ICD-10-CM

## 2019-10-31 DIAGNOSIS — M79.604 PAIN OF RIGHT LOWER EXTREMITY: ICD-10-CM

## 2019-10-31 DIAGNOSIS — M76.31 IT BAND SYNDROME, RIGHT: ICD-10-CM

## 2019-10-31 RX ORDER — OXYCODONE HYDROCHLORIDE 5 MG/1
TABLET ORAL
COMMUNITY
Start: 2019-10-15 | End: 2022-06-10

## 2019-10-31 RX ORDER — TRIAMCINOLONE ACETONIDE 40 MG/ML
40 INJECTION, SUSPENSION INTRA-ARTICULAR; INTRAMUSCULAR ONCE
Qty: 1 ML | Refills: 0
Start: 2019-10-31 | End: 2019-10-31

## 2019-10-31 NOTE — PROGRESS NOTES
Patient: Agustín Dewitt MRN: [id number]       SSN: [id number]  YOB: 1960        AGE: 62 y.o. SEX: male          PCP: Jed Thao MD  10/31/19    Chief Complaint   Patient presents with    Knee Pain     Right       HISTORY:  Agustín Dewitt is a 62 y.o. male c/o a 1 week hx of right hip pain radiating down to right knee. He was seen in ER for knee pain however was not examined of the hip. No trauma, nor history of right hip concerns. Pain Assessment  10/31/2019   Location of Pain Knee   Pain Location Comment -   Location Modifiers Right   Severity of Pain 10   Quality of Pain Throbbing; Denise Nail; Aching   Quality of Pain Comment -   Duration of Pain Persistent   Frequency of Pain Constant   Date Pain First Started -   Aggravating Factors Walking;Standing;Bending   Aggravating Factors Comment -   Limiting Behavior Yes   Relieving Factors Nothing   Relieving Factors Comment -   Result of Injury No           Lab Results   Component Value Date/Time    Hemoglobin A1c 5.8 (H) 05/24/2016 02:31 AM     Weight Metrics 10/31/2019 10/30/2019 8/27/2019 6/25/2019 6/11/2019 3/26/2019 3/12/2019   Weight 180 lb 9.6 oz 177 lb 176 lb 176 lb 175 lb 175 lb 175 lb 3.2 oz   BMI 29.15 kg/m2 28.57 kg/m2 28.41 kg/m2 28.41 kg/m2 28.25 kg/m2 28.25 kg/m2 29.15 kg/m2            Problem List Items Addressed This Visit     None      Visit Diagnoses     Trochanteric bursitis of right hip    -  Primary    Relevant Medications    triamcinolone acetonide (KENALOG) 40 mg/mL injection    Other Relevant Orders    DRAIN/INJECT LARGE JOINT/BURSA    TRIAMCINOLONE ACETONIDE INJ    REFERRAL TO PHYSICAL THERAPY    Pain of right lower extremity        Relevant Medications    triamcinolone acetonide (KENALOG) 40 mg/mL injection    Other Relevant Orders    AMB POC X-RAY RADEX HIP UNILATERAL WITH PELVIS 1 VIEW (Completed)    DRAIN/INJECT LARGE JOINT/BURSA    TRIAMCINOLONE ACETONIDE INJ REFERRAL TO PHYSICAL THERAPY    It band syndrome, right        Relevant Medications    triamcinolone acetonide (KENALOG) 40 mg/mL injection    Other Relevant Orders    DRAIN/INJECT LARGE JOINT/BURSA    TRIAMCINOLONE ACETONIDE INJ    REFERRAL TO PHYSICAL THERAPY          PAST MEDICAL HISTORY:   Past Medical History:   Diagnosis Date    Hypertension     Liver disease     Hepatitis C    Low back pain     Lumbar stenosis     L3-L4, L4-L5    Migraine headache     Numbness and tingling in left hand     Radiculopathy     Right L4-L5    Right leg pain     Sensation of cold in leg     Radiating into right posterior buttock and anterior thigh    Unsteady gait        PAST SURGICAL HISTORY:   Past Surgical History:   Procedure Laterality Date    COLONOSCOPY N/A 11/6/2018    COLONOSCOPY, SCREENING /c Bx Polypectomy /c Hot Snared Polypectomy performed by Jeanne Burch MD at [facility] 11/9/2018    SIGMOIDOSCOPY FLEXIBLE: ENDO CLIP APPLICATION performed by Jeanne Burch MD at [address] [place] [address]  05/23/2016    ACDF C3/4 C4/5 C5/6 C6/7, Dr. Chand [facility]  05/25/16    hematoma removal from cervical spine       ALLERGIES:   Allergies   Allergen Reactions    Cymbalta [Duloxetine] Itching    Percocet [Oxycodone-Acetaminophen] Itching        CURRENT MEDICATIONS:  A list of medications prior to the time of admission include:  Prior to Admission medications    Medication Sig Start Date End Date Taking? Authorizing Provider   oxyCODONE IR (ROXICODONE) 5 mg immediate release tablet  10/15/19  Yes Provider, Historical   triamcinolone acetonide (KENALOG) 40 mg/mL injection 1 mL by IntraBURSal route once for 1 dose. 10/31/19 10/31/19 Yes Dheeraj Duarte PA-C   traMADol (ULTRAM) 50 mg tablet Take 1 Tab by mouth every six (6) hours as needed for Pain for up to 3 days.  Max Daily Amount: 200 mg. 10/30/19 11/2/19 Yes MENDOZA [place] cyclobenzaprine (FLEXERIL) 10 mg tablet Take 1 Tab by mouth three (3) times daily as needed for Muscle Spasm(s). 6/11/19  Yes Amie Cardona NP   lisinopril (PRINIVIL, ZESTRIL) 20 mg tablet Take 20 mg by mouth nightly. Yes Provider, Historical   naloxone (NARCAN) 4 mg/actuation nasal spray Use 1 spray intranasally, then discard. Repeat with new spray every 2 min as needed for opioid overdose symptoms, alternating nostrils. 9/12/18  Yes Mikey Briseno MD   amLODIPine (NORVASC) 5 mg tablet Take 5 mg by mouth daily. Yes Provider, Historical   lisinopril-hydrochlorothiazide (PRINZIDE, ZESTORETIC) 20-25 mg per tablet Take 1 Tab by mouth daily. 2/3/16  Yes Mayte Zavala NP       FAMILY HISTORY:   Family History   Problem Relation Age of Onset    Hypertension Mother     Heart Disease Mother     Diabetes Mother     Hypertension Father        SOCIAL HISTORY:   Social History     Socioeconomic History    Marital status:      Spouse name: Not on file    Number of children: Not on file    Years of education: Not on file    Highest education level: Not on file   Tobacco Use    Smoking status: Former Smoker     Packs/day: 0.50     Years: 40.00     Pack years: 20.00     Types: Cigarettes, Pipe     Last attempt to quit: 4/17/2016     Years since quitting: 3.5    Smokeless tobacco: Never Used   Substance and Sexual Activity    Alcohol use: Yes     Alcohol/week: 4.0 standard drinks     Types: 4 Glasses of wine per week     Comment: Daily- wine and beer    Drug use: No    Sexual activity: Yes     Partners: Female     Birth control/protection: None       ROS:No CP, No SOB, No fever/chills nor night sweats. No headaches, vision abnormalities to include double and oral loss of vision. No hearing abnormalities. Musculoskeletal pain per HPI. Pain is exacerbated positionally. Pt denies h/o spinal surgery, injections, or PT/chiropractor.  Self treated with less than adequate relief on oral antiinflammatories. . Pt denies change in bowel or bladder habits. Pt denies fever, weight loss, or skin changes. EXAM:  Patient alert and oriented x 3,   CN II-XII grossly intact  Sitting comfortably in the exam room, interacting with conversation with pleasant affect. Breathing appears regular effortless with no visible usage of accessory muscles  Distal cap refill intact at 2/2 Arthur UE / LE. Neuro intact Arthur UE/LE to noxious stimuli    Examination to right hip reveals skin intact no evidence of skin breakdown or infection. No warmth erythema edema ecchymosis or effusion. No indurations noted. No hematoma/seroma. The right hip is tender directly over the greater trochanteric bursal region with pain radiating to palpation 14 cm distal from the point of maximal tenderness. With patient sitting on the exam room table right knee flexed at 90 degrees passive internal and external rotation of the right hip noted to 8 and 12 degrees with pain on internal rotation. Hip flexor strength weaker in the right leg today when compared to the left tested on the right at 3+/5 against resistance. Left hip flexor strength 4+/5. DIAGNOSTIC IMAGING: AP pelvis and right hip reveal early inferior acetabular osteoarthritis noted. No other osseous deformities lesions or masses noted. IMPRESSION:  1. Right hip pain  2. Right hip trochanteric bursitis  3. Decreased range of motion of the right hip  4. Early osteoarthritis of the right hip  5. IT band syndrome    Medical Decision Making with Comprehensive Data Review:    The patients presenting problems have been discussed, and they/their family are in agreement with the care plan formulated and outlined with them. Treatment option(s) discussed to include, but cannot be limited to Physical / Occupational outpatient therapy, arthrocentesis, elective and or urgent surgical intervention of aforementioned patient medical condition.  I have encouraged the patient / family to ask questions as they arise throughout their visit. The patient elected after all options discussed to treat the   Right hip trochanteric bursitis with low dose cortisone and physical therapy. Procedural: Using sterile technique and verbal written consent were obtained and appropriate timeout performed patient laying left recumbent the right hip was addressed at the point of maximal tenderness associated with greater trochanteric bursal region and 1 cc of Kenalog 40 mg per male mixed with 7 mL's of Sensorcaine 0.75% injected. Patient tolerated the procedure well. 1.) Please continue current medications as prescribed for pain recommend Tylenol / Motrin per manufactures recommendations  2.) Please maintain current level of activity as discussed at visit today, implement the discussed RICE regimen. 3.) Provider will initiate and/or consider initiating physical therapy to assist in patient's safe recoveries. 4.) Ok to apply ice or a cold pack with a towel over the skin for 15 min on and 45 minutes off and may repeat up to 4 times within 24 hours. 5.) Provider discussed the risk of falls , mobility assisted device questions discussed thoroughly.   6.) Treat the   Problem List Items Addressed This Visit     None      Visit Diagnoses     Trochanteric bursitis of right hip    -  Primary    Relevant Medications    triamcinolone acetonide (KENALOG) 40 mg/mL injection    Other Relevant Orders    DRAIN/INJECT LARGE JOINT/BURSA    TRIAMCINOLONE ACETONIDE INJ    REFERRAL TO PHYSICAL THERAPY    Pain of right lower extremity        Relevant Medications    triamcinolone acetonide (KENALOG) 40 mg/mL injection    Other Relevant Orders    AMB POC X-RAY RADEX HIP UNILATERAL WITH PELVIS 1 VIEW (Completed)    DRAIN/INJECT LARGE JOINT/BURSA    TRIAMCINOLONE ACETONIDE INJ    REFERRAL TO PHYSICAL THERAPY    It band syndrome, right        Relevant Medications    triamcinolone acetonide (KENALOG) 40 mg/mL injection    Other Relevant Orders    DRAIN/INJECT LARGE JOINT/BURSA    TRIAMCINOLONE ACETONIDE INJ    REFERRAL TO PHYSICAL THERAPY       presenting medical problem(s) per protocol established on \"evidence based methods\". 7.) Provider recommended to the patient future action(s) that could limit the medical condition from re occurring, and or continue to support their recoveries. 8.) Establish a comprehensive follow up plan that is logical, and accessible for patient to follow with all pertinent medical providers, and or facilities in a timely fashion in order to      continue continuity of care. Patient provided a reminder for a \"due or due soon\" health maintenance. I have asked the patient to schedule an appointment with their primary care provider for follow-up on general health maintenance concerns. Today all the patient's questions were answered to their satisfaction. Copies of x-rays reviewed if obtained this visit, and provided to patient. Please note: This document has been produced using voice recognition software. Unrecognized errors in transcription may be present. Kian RYAN, APC, MPAS, PA-C  [facility]

## 2019-11-11 PROBLEM — R73.9 HYPERGLYCEMIA: Status: ACTIVE | Noted: 2019-11-11

## 2019-11-11 PROBLEM — E11.9 DIABETES MELLITUS, NEW ONSET (HCC): Status: ACTIVE | Noted: 2019-11-11

## 2019-11-11 PROBLEM — E86.0 DEHYDRATION: Status: ACTIVE | Noted: 2019-11-11

## 2019-11-12 PROBLEM — E11.9 DIABETES MELLITUS (HCC): Status: ACTIVE | Noted: 2019-11-12

## 2019-11-12 PROBLEM — E87.1 HYPONATREMIA: Status: ACTIVE | Noted: 2019-11-12

## 2019-11-12 PROBLEM — F10.20 CHRONIC ALCOHOL DEPENDENCE, CONTINUOUS (HCC): Chronic | Status: ACTIVE | Noted: 2019-11-12

## 2019-11-15 PROBLEM — R73.9 HYPERGLYCEMIA: Status: RESOLVED | Noted: 2019-11-11 | Resolved: 2019-11-15

## 2019-11-15 PROBLEM — E86.0 DEHYDRATION: Status: RESOLVED | Noted: 2019-11-11 | Resolved: 2019-11-15

## 2019-11-15 PROBLEM — E87.1 HYPONATREMIA: Status: RESOLVED | Noted: 2019-11-12 | Resolved: 2019-11-15

## 2019-11-15 PROBLEM — N17.9 AKI (ACUTE KIDNEY INJURY) (HCC): Status: ACTIVE | Noted: 2019-11-15

## 2019-11-15 PROBLEM — N17.9 AKI (ACUTE KIDNEY INJURY) (HCC): Status: RESOLVED | Noted: 2019-11-15 | Resolved: 2019-11-15

## 2019-12-31 NOTE — DISCHARGE INSTRUCTIONS
[phone]. Amando Martellor RN, BSN    [id number]   Dr. Silvestre North returned call, agreed to hold coreg and continue to monitor patient.      Amando Randhawa RN, BSN [facility]      Post Procedures Instructions    *Resume Diet and Activity as tolerated. Rest for the remainder of the day. *You may fell worse before you feel better as the numbing medications wear off before the steroids take effect if used for your procedures. *Do not use affected extremity until numbness or loss of sensation has completely resolved without assistance. *DO NOT DRIVE, operate machinery/heavey equipment for 24 hours. *DO NOT DRINK ALCOHOL for 24 hours as it may interact with the sedation if you received it and also thins your blood and may cause you to bleed. *WAIT 24 hours before starting back ANY Blood thinning medications:   (Heparin, Coumadin, Warfarin, Lovenox, Plavix, Aggrenox)    *Resume Pre-Procedure Medications as prescribed except Blood Thinners unless directed by your Physician or Cardiologist.     *Avoid Hot tubs and Heating pad for 24 hours to prevent dissipation of medications, you may shower to remove bandages and remaining prep residue on the skin. * If you develop a Headache, drink plenty of fluids including beverages with caffeine (Coffee, Mt. Dew etc.) and rest.  If the headache persists longer than 24 hoursor intensifies - Please call [facility]) [phone]      * If you are DIABETIC, check your blood sugar three times a day for the next three days, the steroids will increase your blood sugar. If your blood sugar is greater than 400 have someone drive you to the nearest [address]. * If you experience any of the following problems, call the [facility] [phone] between 8:00 am - 4:30pm or After Hours [phone].     Shortness of breath    Fever of 101 F or higher    Nausea / Vomiting (not normal to you)    Increasing stiffness in the neck    Weakness or numbness in the arms or legs that is not resolving    Prolonged and increasing pain > than 4 days    ANYTHING OUT of the ORDINARY TO YOU    If YOU are experiencing a severe reaction / complication that you have never had before post procedure, call 911 or go to the nearest emergency room! All patients must have a  for transportation [place] regardless if you do or do not receive sedation. DISCHARGE SUMMARY from Nurse      PATIENT INSTRUCTIONS:    After Oral  or intravenous sedation, for 24 hours or while taking prescription Narcotics:  · Limit your activities  · Do not drive and operate hazardous machinery  · Do not make important personal or business decisions  · Do  not drink alcoholic beverages  · If you have not urinated within 8 hours after discharge, please contact your surgeon on call. Report the following to your surgeon:  · Excessive pain, swelling, redness or odor of or around the surgical area  · Temperature over 101  · Nausea and vomiting lasting longer than 4 hours or if unable to take medications  · Any signs of decreased circulation or nerve impairment to extremity: change in color, persistent  numbness, tingling, coldness or increase pain  · Any questions        What to do at Home:  Recommended activity: Activity as tolerated, NO DRIVING FOR 24 Hours post injection          *  Please give a list of your current medications to your Primary Care Provider. *  Please update this list whenever your medications are discontinued, doses are      changed, or new medications (including over-the-counter products) are added. *  Please carry medication information at all times in case of emergency situations. These are general instructions for a healthy lifestyle:    No smoking/ No tobacco products/ Avoid exposure to second hand smoke    Surgeon General's Warning:  Quitting smoking now greatly reduces serious risk to your health.     Obesity, smoking, and sedentary lifestyle greatly increases your risk for illness    A healthy diet, regular physical exercise & weight monitoring are important for maintaining a healthy lifestyle    You may be retaining fluid if you have a history of heart failure or if you experience any of the following symptoms:  Weight gain of 3 pounds or more overnight or 5 pounds in a week, increased swelling in our hands or feet or shortness of breath while lying flat in bed. Please call your doctor as soon as you notice any of these symptoms; do not wait until your next office visit. Recognize signs and symptoms of STROKE:    F-face looks uneven    A-arms unable to move or move unevenly    S-speech slurred or non-existent    T-time-call 911 as soon as signs and symptoms begin-DO NOT go       Back to bed or wait to see if you get better-TIME IS BRAIN. [employer] Activation    Thank you for requesting access to [employer]. Please follow the instructions below to securely access and download your online medical record. [employer] allows you to send messages to your doctor, view your test results, renew your prescriptions, schedule appointments, and more. How Do I Sign Up? 1. In your internet browser, go to [email or web address]  2. Click on the First Time User? Click Here link in the Sign In box. You will be redirect to the New Member Sign Up page. 3. Enter your [employer] Access Code exactly as it appears below. You will not need to use this code after youve completed the sign-up process. If you do not sign up before the expiration date, you must request a new code. [employer] Access Code: [email or web address]  Expires: 2017  8:20 PM (This is the date your [employer] access code will )    4. Enter the last four digits of your Social Security Number (xxxx) and Date of Birth (mm/dd/yyyy) as indicated and click Submit. You will be taken to the next sign-up page. 5. Create a [employer] ID. This will be your [employer] login ID and cannot be changed, so think of one that is secure and easy to remember. 6. Create a [employer] password.  You can change your password at any time. 7. Enter your Password Reset Question and Answer. This can be used at a later time if you forget your password. 8. Enter your e-mail address. You will receive e-mail notification when new information is available in [address] 9. Click Sign Up. You can now view and download portions of your medical record. 10. Click the Download Summary menu link to download a portable copy of your medical information. Additional Information    If you have questions, please visit the Frequently Asked Questions section of the [employer] website at [email or web address] Remember, [employer] is NOT to be used for urgent needs. For medical emergencies, dial 911.

## 2020-01-23 ENCOUNTER — DOCUMENTATION ONLY (OUTPATIENT)
Dept: DIABETES SERVICES | Age: 60
End: 2020-01-23

## 2020-01-23 NOTE — PROGRESS NOTES
[facility] Diabetes Education Class    Patient attended outpatient [employer] [facility] Diabetes Education Classes. Dates and class topics attended are noted below. Location: [facility]    Date Attended Class/Topic    Class 1: Understanding Diabetes  Strategies to staying healthy with diabetes were covered including prevention and treatment of acute and chronic complications of diabetes, principles of self-monitoring of blood glucose, target blood glucose and A1C levels and sick day management. 01/09/2020 Class 2: Nutrition and Diabetes  Principles of meal planning with diabetes were covered including carbohydrate counting, label reading, portion control, recipes and where needed, weight control. A personalized calorie consistent carbohydrate meal plan and reference books were provided. 01/16/2020 Class 3: Diabetes Medications and Foot Care  This class was facilitated by a staff pharmacist and included information on diabetes medications (oral and subcutaneous). In addition, interactions of common over the counter and prescription medications with diabetes management were reviewed. Principles of good foot care were reviewed, including proper foot inspections, how to select appropriate shoes and socks and ways to keep the feet healthy. 01/23/2020 Class 4: Exercise and Wrap-Up of Nutrition  Discussed exercise strategies that can help with blood glucose control. Additional nutrition related topics were discussed. Please contact me if you have questions.     [address],      Calin Bedoya, [address],  [email or web address]

## 2020-01-27 ENCOUNTER — HOSPITAL ENCOUNTER (OUTPATIENT)
Dept: LAB | Age: 60
Discharge: HOME | End: 2020-01-27
Payer: MEDICARE

## 2020-01-27 LAB
ALBUMIN SERPL-MCNC: 3.7 G/DL (ref 3.4–5)
ALBUMIN/GLOB SERPL: 1.1 {RATIO} (ref 0.8–1.7)
ALP SERPL-CCNC: 55 U/L (ref 45–117)
ALT SERPL-CCNC: 19 U/L (ref 16–61)
ANION GAP SERPL CALC-SCNC: 5 MMOL/L (ref 3–18)
AST SERPL-CCNC: 16 U/L (ref 10–38)
BASOPHILS # BLD: 0.1 K/UL (ref 0–0.1)
BASOPHILS NFR BLD: 1 % (ref 0–2)
BILIRUB SERPL-MCNC: 0.5 MG/DL (ref 0.2–1)
BUN SERPL-MCNC: 23 MG/DL (ref 7–18)
BUN/CREAT SERPL: 21 (ref 12–20)
CALCIUM SERPL-MCNC: 9 MG/DL (ref 8.5–10.1)
CHLORIDE SERPL-SCNC: 106 MMOL/L (ref 100–111)
CHOLEST SERPL-MCNC: 152 MG/DL
CO2 SERPL-SCNC: 30 MMOL/L (ref 21–32)
CREAT SERPL-MCNC: 1.07 MG/DL (ref 0.6–1.3)
DIFFERENTIAL METHOD BLD: ABNORMAL
EOSINOPHIL # BLD: 0.3 K/UL (ref 0–0.4)
EOSINOPHIL NFR BLD: 3 % (ref 0–5)
ERYTHROCYTE [DISTWIDTH] IN BLOOD BY AUTOMATED COUNT: 13.1 % (ref 11.6–14.5)
GLOBULIN SER CALC-MCNC: 3.4 G/DL (ref 2–4)
GLUCOSE SERPL-MCNC: 74 MG/DL (ref 74–99)
HCT VFR BLD AUTO: 37.9 % (ref 36–48)
HDLC SERPL-MCNC: 38 MG/DL (ref 40–60)
HDLC SERPL: 4 {RATIO} (ref 0–5)
HGB BLD-MCNC: 12.4 G/DL (ref 13–16)
LDLC SERPL CALC-MCNC: 103.6 MG/DL (ref 0–100)
LIPID PROFILE,FLP: ABNORMAL
LYMPHOCYTES # BLD: 3.7 K/UL (ref 0.9–3.6)
LYMPHOCYTES NFR BLD: 40 % (ref 21–52)
MCH RBC QN AUTO: 28.8 PG (ref 24–34)
MCHC RBC AUTO-ENTMCNC: 32.7 G/DL (ref 31–37)
MCV RBC AUTO: 87.9 FL (ref 74–97)
MONOCYTES # BLD: 0.6 K/UL (ref 0.05–1.2)
MONOCYTES NFR BLD: 6 % (ref 3–10)
NEUTS SEG # BLD: 4.7 K/UL (ref 1.8–8)
NEUTS SEG NFR BLD: 50 % (ref 40–73)
PLATELET # BLD AUTO: 235 K/UL (ref 135–420)
PMV BLD AUTO: 10.4 FL (ref 9.2–11.8)
POTASSIUM SERPL-SCNC: 4.2 MMOL/L (ref 3.5–5.5)
PROT SERPL-MCNC: 7.1 G/DL (ref 6.4–8.2)
RBC # BLD AUTO: 4.31 M/UL (ref 4.7–5.5)
SODIUM SERPL-SCNC: 141 MMOL/L (ref 136–145)
T4 SERPL-MCNC: 8.8 UG/DL (ref 4.5–12.1)
TRIGL SERPL-MCNC: 52 MG/DL (ref ?–150)
TSH SERPL DL<=0.05 MIU/L-ACNC: 1.89 UIU/ML (ref 0.36–3.74)
VLDLC SERPL CALC-MCNC: 10.4 MG/DL
WBC # BLD AUTO: 9.3 K/UL (ref 4.6–13.2)

## 2020-01-27 PROCEDURE — 80061 LIPID PANEL: CPT

## 2020-01-27 PROCEDURE — 80053 COMPREHEN METABOLIC PANEL: CPT

## 2020-01-27 PROCEDURE — 85025 COMPLETE CBC W/AUTO DIFF WBC: CPT

## 2020-01-27 PROCEDURE — 84436 ASSAY OF TOTAL THYROXINE: CPT

## 2020-01-27 PROCEDURE — 36415 COLL VENOUS BLD VENIPUNCTURE: CPT

## 2020-02-28 ENCOUNTER — DOCUMENTATION ONLY (OUTPATIENT)
Dept: DIABETES SERVICES | Age: 60
End: 2020-02-28

## 2020-02-28 NOTE — PROGRESS NOTES
[facility] Diabetes Education Class    Patient attended outpatient [address] [facility] Diabetes Education Classes. Dates and class topics attended are noted below. Location: [facility]    Date Attended Class/Topic   02/06/2020 Class 1: Understanding Diabetes  Strategies to staying healthy with diabetes were covered including prevention and treatment of acute and chronic complications of diabetes, principles of self-monitoring of blood glucose, target blood glucose and A1C levels and sick day management. 02/13/2020 Class 2: Nutrition and Diabetes  Principles of meal planning with diabetes were covered including carbohydrate counting, label reading, portion control, the plate method, and weight control. Choose Your Foods, Exchange List for Diabetes reference books were provided. Class 3: Diabetes Medications and Exercise  Information on diabetes medications (oral and subcutaneous). In addition, interactions of common over the counter and prescription medications with diabetes management were reviewed. Discussed exercise strategies that can help with blood glucose control. Class 4: Foot Care and Wrap-Up of Nutrition  Principles of good foot care were reviewed, including proper foot inspections, how to select appropriate shoes and socks and ways to keep the feet healthy. Additional nutrition related topics were discussed including eating out with diabetes. Please contact me if you have questions.     [address],      Gomez Paz, [address], [address] [email or web address]

## 2020-03-16 ENCOUNTER — HOSPITAL ENCOUNTER (OUTPATIENT)
Dept: VASCULAR SURGERY | Age: 60
Discharge: HOME | End: 2020-03-16
Attending: PODIATRIST
Payer: MEDICARE

## 2020-03-16 DIAGNOSIS — E11.52 DIABETIC GANGRENE (HCC): ICD-10-CM

## 2020-03-16 LAB
LEFT ABI: 0.96
LEFT ANTERIOR TIBIAL: 105 MMHG
LEFT ARM BP: 109 MMHG
LEFT CALF PRESSURE: 115 MMHG
LEFT POSTERIOR TIBIAL: 89 MMHG
RIGHT ABI: 0.69
RIGHT ANTERIOR TIBIAL: 71 MMHG
RIGHT ARM BP: 106 MMHG
RIGHT CALF PRESSURE: 67 MMHG
RIGHT POSTERIOR TIBIAL: 75 MMHG

## 2020-03-16 PROCEDURE — 93923 UPR/LXTR ART STDY 3+ LVLS: CPT

## 2020-03-17 ENCOUNTER — OFFICE VISIT (OUTPATIENT)
Dept: ORTHOPEDIC SURGERY | Age: 60
End: 2020-03-17

## 2020-03-17 VITALS
HEIGHT: 66 IN | TEMPERATURE: 98.5 F | BODY MASS INDEX: 27.92 KG/M2 | HEART RATE: 72 BPM | DIASTOLIC BLOOD PRESSURE: 75 MMHG | OXYGEN SATURATION: 99 % | RESPIRATION RATE: 16 BRPM | SYSTOLIC BLOOD PRESSURE: 109 MMHG

## 2020-03-17 DIAGNOSIS — M54.42 CHRONIC BILATERAL LOW BACK PAIN WITH BILATERAL SCIATICA: ICD-10-CM

## 2020-03-17 DIAGNOSIS — M48.062 SPINAL STENOSIS OF LUMBAR REGION WITH NEUROGENIC CLAUDICATION: Primary | ICD-10-CM

## 2020-03-17 DIAGNOSIS — G89.29 CHRONIC BILATERAL LOW BACK PAIN WITH BILATERAL SCIATICA: ICD-10-CM

## 2020-03-17 DIAGNOSIS — M54.41 CHRONIC BILATERAL LOW BACK PAIN WITH BILATERAL SCIATICA: ICD-10-CM

## 2020-03-17 RX ORDER — TIAGABINE HYDROCHLORIDE 4 MG/1
TABLET, FILM COATED ORAL
Qty: 60 TAB | Refills: 1 | Status: SHIPPED | OUTPATIENT
Start: 2020-03-17 | End: 2020-06-02 | Stop reason: ALTCHOICE

## 2020-03-17 NOTE — PROGRESS NOTES
Sussy Edwards. presents today for   Chief Complaint   Patient presents with    Back Pain     FU    Leg Pain       Is someone accompanying this pt? NO    Is the patient using any DME equipment during OV? NO    Depression Screening:  3 most recent PHQ Screens 3/17/2020   PHQ Not Done -   Little interest or pleasure in doing things Not at all   Feeling down, depressed, irritable, or hopeless Not at all   Total Score PHQ 2 0       Learning Assessment:  Learning Assessment 12/9/2016   PRIMARY LEARNER Patient   BARRIERS PRIMARY LEARNER NONE   PRIMARY LANGUAGE ENGLISH   LEARNER PREFERENCE PRIMARY DEMONSTRATION     LISTENING     READING   ANSWERED BY patient   RELATIONSHIP SELF         OPIOID RISK TOOL  Opioid Risk Tool 12/4/2017   Family history of substance abuse for alcohol 0   Family history of substance abuse for illegal drugs 0   Family history of substance abuse for prescription drugs 0   Personal history of substance abuse for alcohol 0   Personal history of substance abuse for illegal drugs 0   Personal history of substance abuse for prescription drugs 0   Age (if between 12 to 39) 0   History of preadolescent sexual abuse 0   History of psychological disease: ADHD, OCD, bipolar disorder, or schizophrenia 0   History of depression 0   Opioid Risk Tool Score 0       Coordination of Care:  1. Have you been to the ER, urgent care clinic since your last visit? NO  Hospitalized since your last visit? NO    2. Have you seen or consulted any other health care providers outside of the [address] [place] [address] since your last visit? NO Include any pap smears or colon screening.  NO    Last  Checked 3/17/20

## 2020-03-17 NOTE — PROGRESS NOTES
Chief complaint   Chief Complaint   Patient presents with    Back Pain     FU    Leg Pain       History of Present Illness:  Tien Thrasher is a  62 y.o.  male who comes in today with back pain and bilateral lower extremity pain. He is inquiring about repeating his lumbar epidural at L3-4 that he has had done in the past and was helpful. His last one was August 27, 2019. He has pain in his back and in his bilateral calves. He states he went to the podiatrist yesterday and they tested his circulation and he was told that his circulation was good. He does report since we last saw him he had a right hip bursa injection by MENDOZA Millard October 31, 2019. It caused his blood sugar to elevate the 707. He had to the emergency room and be admitted in the hospital for a week. He is tried multiple antiemetics in the past including Cymbalta, Lyrica, gabapentin, Topamax. They were not effective for him. He does report he did not try them for very long. He continues on sister disability. He is a non-smoker. He denies fever bowel bladder dysfunction. Physical Exam: Patient is a 68-year-old male. He is alert and oriented with a normal mood and affect. He has a full weightbearing nonantalgic gait. He does not use any assistive device. He has 4 5 strength of his bilateral lower extremities. Negative straight leg raise. He does not have any pain with hyperextension lumbar spine. Assessment and Plan: This is a patient who has known stenosis and has had benefited from epidural steroid injections in the past.  He now has diabetes in the last steroid injection he got in his knee caused his blood sugar to go up to 707. I do not feel like we should risk doing a lumbar epidural.  We will  start him on Gabitril 4 mg ramping up to twice a day. I informed him this could take 4 to 6 weeks to become effective. He agrees to continue to take it until it is effective.   We will see him back in 3 months sooner if needed. Review of systems:    Past Medical History:   Diagnosis Date    Diabetes mellitus, new onset ([facility] [address] 11/11/2019    Hypertension     Liver disease     Hepatitis C    Low back pain     Lumbar stenosis     L3-L4, L4-L5    Migraine headache     Numbness and tingling in left hand     Radiculopathy     Right L4-L5    Right leg pain     Sensation of cold in leg     Radiating into right posterior buttock and anterior thigh    Unsteady gait      Past Surgical History:   Procedure Laterality Date    COLONOSCOPY N/A 11/6/2018    COLONOSCOPY, SCREENING /c Bx Polypectomy /c Hot Snared Polypectomy performed by Rayshawn Ureña MD at [address] N/A 11/9/2018    SIGMOIDOSCOPY FLEXIBLE: ENDO CLIP APPLICATION performed by Rayshawn Ureña MD at [address]  [facility]  05/23/2016    ACDF C3/4 C4/5 C5/6 C6/7, Dr. Giovanny Vickers  05/25/16    hematoma removal from cervical spine     Social History     Socioeconomic History    Marital status:      Spouse name: Not on file    Number of children: Not on file    Years of education: Not on file    Highest education level: Not on file   Occupational History    Not on file   Social Needs    Financial resource strain: Not on file    Food insecurity     Worry: Not on file     Inability: Not on file   [employer] needs     Medical: Not on file     Non-medical: Not on file   Tobacco Use    Smoking status: Former Smoker     Packs/day: 0.50     Years: 40.00     Pack years: 20.00     Types: Cigarettes, Pipe     Last attempt to quit: 4/17/2016     Years since quitting: 3.9    Smokeless tobacco: Never Used   Substance and Sexual Activity    Alcohol use:  Yes     Alcohol/week: 4.0 standard drinks     Types: 4 Glasses of wine per week     Comment: Daily- wine and beer    Drug use: No    Sexual activity: Yes     Partners: Female     Birth control/protection: None   Lifestyle    Physical activity     Days per week: Not on file     Minutes per session: Not on file    Stress: Not on file   Relationships    Social connections     Talks on phone: Not on file     Gets together: Not on file     Attends Buddhism service: Not on file     Active member of club or organization: Not on file     Attends meetings of clubs or organizations: Not on file     Relationship status: Not on file    Intimate partner violence     Fear of current or ex partner: Not on file     Emotionally abused: Not on file     Physically abused: Not on file     Forced sexual activity: Not on file   Other Topics Concern    Not on file   Social History Narrative    Not on file     Family History   Problem Relation Age of Onset    Hypertension Mother     Heart Disease Mother     Diabetes Mother     Hypertension Father        Physical Exam:  Visit Vitals  /75 (BP 1 Location: Left arm, BP Patient Position: Sitting)   Pulse 72   Temp 98.5 °F (36.9 °C) (Oral)   Resp 16   Ht 5' 6\" (1.676 m)   SpO2 99%   BMI 27.92 kg/m²     Pain Scale: 8/10       has been . reviewed and is appropriate          Diagnoses and all orders for this visit:    1. Spinal stenosis of lumbar region with neurogenic claudication  -     tiaGABine (GabitriL) 4 mg tablet; Take 1 tab po every day x 1 week then 1 tab bid    2. Chronic bilateral low back pain with bilateral sciatica  -     tiaGABine (GabitriL) 4 mg tablet; Take 1 tab po every day x 1 week then 1 tab bid            Follow-up and Dispositions    · Return in about 3 months (around 6/17/2020) for with NP.              We have informed Anna Margot. to notify us for immediate appointment if he has any worsening neurogical symptoms or if an emergency situation presents, then call [phone]

## 2020-04-28 ENCOUNTER — VIRTUAL VISIT (OUTPATIENT)
Dept: VASCULAR SURGERY | Age: 60
End: 2020-04-28

## 2020-04-28 DIAGNOSIS — I70.209 FEMORAL ARTERY STENOSIS (HCC): ICD-10-CM

## 2020-04-28 DIAGNOSIS — I70.0 AORTO-ILIAC ATHEROSCLEROSIS (HCC): ICD-10-CM

## 2020-04-28 DIAGNOSIS — I70.213 ATHEROSCLEROSIS OF NATIVE ARTERY OF BOTH LOWER EXTREMITIES WITH INTERMITTENT CLAUDICATION (HCC): ICD-10-CM

## 2020-04-28 DIAGNOSIS — I73.9 PAD (PERIPHERAL ARTERY DISEASE) (HCC): Primary | ICD-10-CM

## 2020-04-28 DIAGNOSIS — I70.8 AORTO-ILIAC ATHEROSCLEROSIS (HCC): ICD-10-CM

## 2020-04-28 NOTE — PROGRESS NOTES
Nicole Perea is a 62 y.o. male who was seen by synchronous (real-time) audio-video technology on 4/28/2020. Consent: Nicole Perea, who was seen by synchronous (real-time) audio-video technology, and/or his healthcare decision maker, is aware that this patient-initiated, Telehealth encounter on 4/28/2020 is a billable service, with coverage as determined by his insurance carrier. He is aware that he may receive a bill and has provided verbal consent to proceed: Yes. Assessment & Plan:   Diagnoses and all orders for this visit:    1. PAD (peripheral artery disease) ([facility] [address]    2. Atherosclerosis of native artery of both lower extremities with intermittent claudication ([address]    3. Aorto-iliac atherosclerosis ([facility] [address]    4. Femoral artery stenosis (HCC)      I reviewed the results of his vascular studies and did correlate that with these findings there is definite association with his describe claudication symptoms. I question him to the effect of what he like his symptoms to feel better which he did acknowledge that he would. So treatment would likely include some type of endovascular intervention. However given the nature of aortoiliac disease on the vascular studies would probably be most useful to initially have a CT angiogram runoff to ensure that we have an endovascular solution but I mentioned to him the possibility that it could be a surgical discussion so once the CTA is obtained and reviewed we will call him back for discussion and recommendations for a treatment plan    I spent at least 29 minutes on this visit with this new patient.  [phone]  Subjective:   Nicole Perea is a 62 y.o. male who was seen for PAD    He was sent to podiatry by PCP for establishing diabetic foot care  It was noted he was described as having burning pain,   Also soreness of his toes, leg cramps - particularly with walking long distance, and worse on the right compared to the left  He had diminished pulses and sensation on podiatry exam  Vascular studies, see below, also confirmed presence of PAD    He states he was a previous smoker but had quit about 5 years ago. He is treated for hypertension, hypercholesterolemia, and diabetes. He does not take antiplatelet so we discussed and encouraged him to take chewable 81 mg aspirin daily which he says he will    He does say he recalls having had some leg symptoms going back to approximately 2016. He says if he does walk long distances he will have pain in both calves that he has to stop and rest before he can continue going. Currently during this quarantine time he has been trying to do at least a daily walk of about 15 to 20 minutes which he is able to do okay but again sometimes still has to stop during the course of that because of the calf discomfort    Prior to Admission medications    Medication Sig Start Date End Date Taking? Authorizing Provider   doxycycline (MONODOX) 100 mg capsule Take 100 mg by mouth two (2) times a day. Other, MD Omer   fluticasone propionate (FLONASE NA) by Nasal route. Other, MD Omer   tiaGABine (GabitriL) 4 mg tablet Take 1 tab po every day x 1 week then 1 tab bid 3/17/20   Juan C Cardona NP   acetaminophen (TYLENOL) 325 mg tablet Take 2 Tabs by mouth every six (6) hours as needed for Pain. 12/10/19   Ulises Pemberton MD   insulin lispro (HUMALOG) 100 unit/mL injection Sliding scale    180 and above 2 units  250 and above 4 units  299 and above 6 units  300 and above 8 units  350 and above 10 units and call Dr Francesca David PCP  Indications: type 2 diabetes mellitus 11/16/19   Jose Roman MD   lisinopril (PRINIVIL, ZESTRIL) 20 mg tablet Take 1 Tab by mouth daily. 11/15/19   Jose Roman MD   insulin glargine (LANTUS) 100 unit/mL injection 22 units hs 11/15/19   Jose Roman MD   metFORMIN (GLUCOPHAGE) 500 mg tablet Take 1 Tab by mouth two (2) times daily (with meals).  Indications: type 2 diabetes mellitus 11/15/19 Renee Reynolds MD   oxyCODONE IR (ROXICODONE) 5 mg immediate release tablet  10/15/19   Provider, Historical   cyclobenzaprine (FLEXERIL) 10 mg tablet Take 1 Tab by mouth three (3) times daily as needed for Muscle Spasm(s). 6/11/19   Amie Cardona, LEONIDAS   amLODIPine (NORVASC) 5 mg tablet Take 5 mg by mouth daily. Provider, Historical     Allergies   Allergen Reactions    Cymbalta [Duloxetine] Itching    Percocet [Oxycodone-Acetaminophen] Itching       Objective: There were no vitals taken for this visit. General: alert, cooperative, no distress   Mental  status: normal mood, behavior, speech, dress, motor activity, and thought processes, able to follow commands   HENT: NCAT   Neck: no visualized mass   Resp: no respiratory distress   Neuro: no gross deficits   Skin: no discoloration or lesions of concern on visible areas   Psychiatric: normal affect, consistent with stated mood, no evidence of hallucinations      TAMARA likely falsely elevated on the left lower extremity secondary to medial calcification  Moderate arterial disease noted within bilateral lower extremities at rest  Disease is located within the aortoiliac segment on the right side  Disease is located within the femoral-popliteal segment on the left side  Recommend vascular surgical consultation      We discussed the expected course, resolution and complications of the diagnosis(es) in detail. Medication risks, benefits, costs, interactions, and alternatives were discussed as indicated. I advised him to contact the office if his condition worsens, changes or fails to improve as anticipated. He expressed understanding with the diagnosis(es) and plan. Alvaro Stout is a 62 y.o. male who was evaluated by a video visit encounter for concerns as above. Patient identification was verified prior to start of the visit. A caregiver was present when appropriate.  Due to this being a TeleHealth encounter ([facility]), evaluation of the following organ systems was limited: Vitals/Constitutional/EENT/Resp/CV/GI//MS/Neuro/Skin/Heme-Lymph-Imm. Pursuant to the emergency declaration under the [address] [place] [address], [facility] waiver authority and the [employer] Act, this Virtual  Visit was conducted, with patient's (and/or legal guardian's) consent, to reduce the patient's risk of exposure to COVID-19 and provide necessary medical care. Services were provided through a video synchronous discussion virtually (via [email or web address]. me) to substitute for in-person clinic visit. Patient was located at their individual home and provider in the office.       MENDOZA Florentino

## 2020-05-01 ENCOUNTER — HOSPITAL ENCOUNTER (OUTPATIENT)
Dept: CT IMAGING | Age: 60
Discharge: HOME | End: 2020-05-01
Attending: PHYSICIAN ASSISTANT
Payer: MEDICARE

## 2020-05-01 DIAGNOSIS — I73.9 PAD (PERIPHERAL ARTERY DISEASE) (HCC): ICD-10-CM

## 2020-05-01 DIAGNOSIS — I70.213 ATHEROSCLEROSIS OF NATIVE ARTERY OF BOTH LOWER EXTREMITIES WITH INTERMITTENT CLAUDICATION (HCC): ICD-10-CM

## 2020-05-01 DIAGNOSIS — I70.0 AORTO-ILIAC ATHEROSCLEROSIS (HCC): ICD-10-CM

## 2020-05-01 DIAGNOSIS — I70.209 FEMORAL ARTERY STENOSIS (HCC): ICD-10-CM

## 2020-05-01 DIAGNOSIS — I70.8 AORTO-ILIAC ATHEROSCLEROSIS (HCC): ICD-10-CM

## 2020-05-01 PROCEDURE — 75635 CT ANGIO ABDOMINAL ARTERIES: CPT

## 2020-05-01 PROCEDURE — 74011636320 HC RX REV CODE- 636/320: Performed by: PHYSICIAN ASSISTANT

## 2020-05-01 RX ADMIN — IOPAMIDOL 125 ML: 755 INJECTION, SOLUTION INTRAVENOUS at 12:18

## 2020-05-05 ENCOUNTER — DOCUMENTATION ONLY (OUTPATIENT)
Dept: VASCULAR SURGERY | Age: 60
End: 2020-05-05

## 2020-05-05 NOTE — PROGRESS NOTES
Called about cta results and recommendations from dr Teofilo Dodson  Pt would benefit from right common femoral artery endarterectomy which I briefly explained. He would like to be able to come in with his wife to discuss surgery and subsequent scheduling.  This will be arranged at the next available time

## 2020-05-08 ENCOUNTER — OFFICE VISIT (OUTPATIENT)
Dept: VASCULAR SURGERY | Age: 60
End: 2020-05-08

## 2020-05-08 VITALS
HEIGHT: 66 IN | BODY MASS INDEX: 26.52 KG/M2 | WEIGHT: 165 LBS | RESPIRATION RATE: 18 BRPM | SYSTOLIC BLOOD PRESSURE: 110 MMHG | DIASTOLIC BLOOD PRESSURE: 64 MMHG

## 2020-05-08 DIAGNOSIS — I70.209 FEMORAL ARTERY STENOSIS (HCC): Primary | ICD-10-CM

## 2020-05-08 DIAGNOSIS — R07.9 CHEST PAIN, UNSPECIFIED TYPE: ICD-10-CM

## 2020-05-08 NOTE — PROGRESS NOTES
Jordan Brown. Chief Complaint   Patient presents with    Surgical Clearance       History and Physical    53-year-old male here today for evaluation of his peripheral artery disease. He had a CTA done that shows dense complex calcific right femoral occlusion. He has diffuse peripheral artery disease as well. We talked about right femoral endarterectomy to at least open up the inflow to his right leg which is most symptomatic leg. Interestingly he has been in the ER this week with chest pain. They recommended admission and work-up for chest pain including neck stress test.  He had left the hospital she is afraid of being admitted during the pandemic.     Past Medical History:   Diagnosis Date    Diabetes mellitus, new onset ([address] 11/11/2019    Hypertension     Liver disease     Hepatitis C    Low back pain     Lumbar stenosis     L3-L4, L4-L5    Migraine headache     Numbness and tingling in left hand     Radiculopathy     Right L4-L5    Right leg pain     Sensation of cold in leg     Radiating into right posterior buttock and anterior thigh    Unsteady gait      Patient Active Problem List   Diagnosis Code    Essential hypertension, benign I10    Sciatica M54.30    Back pain M54.9    Hepatitis C B19.20    Carpal tunnel syndrome G56.00    Prediabetes R73.03    Cervical myelopathy (HCC) G95.9    S/P cervical discectomy Z98.890    S/P cervical spinal fusion 5/2016 Z98.1    Lumbar spinal stenosis M48.061    Encounter for long-term use of opiate analgesic Z79.891    Rectal bleeding K62.5    Diabetes mellitus, new onset ([address] E11.9    Diabetes mellitus ([address] E11.9    Chronic alcohol dependence, continuous ([address] F10.20     Past Surgical History:   Procedure Laterality Date    COLONOSCOPY N/A 11/6/2018    COLONOSCOPY, SCREENING /c Bx Polypectomy /c Hot Snared Polypectomy performed by Sejal Leroy MD at [address] N/A 11/9/2018    SIGMOIDOSCOPY FLEXIBLE: ENDO CLIP APPLICATION performed by Avinash Jones MD at [address] [facility]  05/23/2016    ACDF C3/4 C4/5 C5/6 C6/7, Dr. Hannah Peacock  05/25/16    hematoma removal from cervical spine     Current Outpatient Medications   Medication Sig Dispense Refill    doxycycline (MONODOX) 100 mg capsule Take 100 mg by mouth two (2) times a day.  fluticasone propionate (FLONASE NA) by Nasal route.  tiaGABine (GabitriL) 4 mg tablet Take 1 tab po every day x 1 week then 1 tab bid 60 Tab 1    acetaminophen (TYLENOL) 325 mg tablet Take 2 Tabs by mouth every six (6) hours as needed for Pain. 20 Tab 0    insulin lispro (HUMALOG) 100 unit/mL injection Sliding scale    180 and above 2 units  250 and above 4 units  299 and above 6 units  300 and above 8 units  350 and above 10 units and call Dr John Alanis PCP  Indications: type 2 diabetes mellitus 1 Vial 1    lisinopril (PRINIVIL, ZESTRIL) 20 mg tablet Take 1 Tab by mouth daily. 30 Tab 1    insulin glargine (LANTUS) 100 unit/mL injection 22 units hs 1 Vial 1    metFORMIN (GLUCOPHAGE) 500 mg tablet Take 1 Tab by mouth two (2) times daily (with meals). Indications: type 2 diabetes mellitus 100 Tab 1    oxyCODONE IR (ROXICODONE) 5 mg immediate release tablet       cyclobenzaprine (FLEXERIL) 10 mg tablet Take 1 Tab by mouth three (3) times daily as needed for Muscle Spasm(s). 90 Tab 0    amLODIPine (NORVASC) 5 mg tablet Take 5 mg by mouth daily. Allergies   Allergen Reactions    Cymbalta [Duloxetine] Itching    Percocet [Oxycodone-Acetaminophen] Itching       Review of Systems    A full review of systems was completed times ten organ systems and was deemed negative unless otherwise mentioned in the HPI.     Physical   Visit Vitals  /64 (BP 1 Location: Left arm, BP Patient Position: Sitting)   Resp 18   Ht 5' 6\" (1.676 m)   Wt 165 lb (74.8 kg)   BMI 26.63 kg/m²       He appears well today does not currently have chest pain  Head is normocephalic  Neck no JVD  Chest is clear  Cardiac regular  Abdomen soft flat nontender  Reduced pulses bilateral lower extremity no ulceration no signs of acute arterial insufficiency. No edema today      Impression/Plan:     ICD-10-CM ICD-9-CM    1. Femoral artery stenosis (HCC) I70.209 440.20    2. Chest pain, unspecified type R07.9 786.50      No orders of the defined types were placed in this encounter. Spoke with cardiology office are doing virtual visits. Ordering a nuclear stress test as he does not want go to the ER still. We will get this test and a virtual visit with cardiology and follow-up recommendations. This will need to be done before he has any surgical intervention planned. Follow-up and Dispositions    · Return in about 4 weeks (around 6/5/2020). Agustín Lancaster MD    PLEASE NOTE:  This document has been produced using voice recognition software. Unrecognized errors in transcription may be present.

## 2020-05-08 NOTE — PROGRESS NOTES
1. Have you been to an emergency room or urgent care clinic since your last visit? 2 days ago [facility] since your last visit? If yes, where, when, and reason for visit? NO  2. Have you seen or consulted any other health care providers outside of the [facility] since your last visit including any procedures, health maintenance items. If yes, where, when and reason for visit?  NO

## 2020-06-02 ENCOUNTER — VIRTUAL VISIT (OUTPATIENT)
Dept: ORTHOPEDIC SURGERY | Age: 60
End: 2020-06-02

## 2020-06-02 DIAGNOSIS — M54.41 CHRONIC BILATERAL LOW BACK PAIN WITH BILATERAL SCIATICA: Primary | ICD-10-CM

## 2020-06-02 DIAGNOSIS — M54.42 CHRONIC BILATERAL LOW BACK PAIN WITH BILATERAL SCIATICA: Primary | ICD-10-CM

## 2020-06-02 DIAGNOSIS — G89.29 CHRONIC BILATERAL LOW BACK PAIN WITH BILATERAL SCIATICA: Primary | ICD-10-CM

## 2020-06-02 DIAGNOSIS — M48.062 SPINAL STENOSIS OF LUMBAR REGION WITH NEUROGENIC CLAUDICATION: ICD-10-CM

## 2020-06-02 DIAGNOSIS — M62.838 MUSCLE SPASM: ICD-10-CM

## 2020-06-02 RX ORDER — BACLOFEN 10 MG/1
10 TABLET ORAL 3 TIMES DAILY
Qty: 90 TAB | Refills: 0 | Status: SHIPPED | OUTPATIENT
Start: 2020-06-02 | End: 2021-06-10 | Stop reason: ALTCHOICE

## 2020-06-02 NOTE — PROGRESS NOTES
History of Present Illness:    Consent: Lavern Corona., who was seen by synchronous (real-time) audio-video technology, and/or his healthcare decision maker, is aware that this patient-initiated, Telehealth encounter on 6/2/2020 is a billable service, with coverage as determined by his insurance carrier. He is aware that he may receive a bill and has provided verbal consent to proceed: Yes. The provider was located at her home office and the patient was located at their residence. No one else participated in the visit with the patient. The platform used was doxy. me    Patient was evaluated for follow-up of his chronic low back pain with bilateral lower extremity pain. He states he is doing okay. He states the Gabitril that we tried him on last visit did not work and he stopped it after a couple of weeks. He is in pain management at [facility] and is on oxycodone 5 mg twice a day. He has had multiple trips to the ER for cough and chest tightness. He went on April 6, April 23, May 6. They ruled out heart issues and COVID-19. He states he has peripheral artery disease in his right leg and needs to have a vascular study done but he has not been able to have that done yet. He was told that he will probably need surgery for that. He sees MENDOZA Millard for her right hip and knee issues. He is tried multiple antiarrhythmics for his back and leg pain including Cymbalta Lyrica gabapentin Topamax and now Gabitril. They have all been ineffective. He remains on Social Security disability and is a non-smoker. He would like to consider surgery on his back eventually, but wants to have his peripheral artery disease addressed first.  He is diabetic his blood sugar runs 90 3 in the morning. He denies fever bowel bladder dysfunction. Physical Exam: The patient is a 75-year-old male well-developed well-nourished who is alert and oriented with a normal mood and affect.   I was able to visualize him ambulate in his home with a full weightbearing nonantalgic gait. He did not use an assistive device. He had pain with hyperextension of her spine. Negative straight leg raises were negative    Vital Signs: (As obtained by patient/caregiver at home)  There were no vitals taken for this visit. [INSTRUCTIONS:  \"[x]\" Indicates a positive item  \"[]\" Indicates a negative item  -- DELETE ALL ITEMS NOT EXAMINED]    Constitutional: [x] Appears well-developed and well-nourished [x] No apparent distress      [] Abnormal -     Mental status: [x] Alert and awake  [x] Oriented to person/place/time [x] Able to follow commands    [] Abnormal -     Eyes:   EOM    [x]  Normal    [] Abnormal -   Sclera  [x]  Normal    [] Abnormal -          Discharge [x]  None visible   [] Abnormal -     HENT: [x] Normocephalic, atraumatic  [] Abnormal -   [x] Mouth/Throat: Mucous membranes are moist    External Ears [x] Normal  [] Abnormal -    Neck: [x] No visualized mass [] Abnormal -     Pulmonary/Chest: [x] Respiratory effort normal   [x] No visualized signs of difficulty breathing or respiratory distress        [] Abnormal -      Musculoskeletal:   [x] Normal gait with no signs of ataxia         [x] Normal range of motion of neck        [] Abnormal -     Neurological:        [x] No Facial Asymmetry (Cranial nerve 7 motor function) (limited exam due to video visit)          [x] No gaze palsy        [] Abnormal -          Skin:        [x] No significant exanthematous lesions or discoloration noted on facial skin         [] Abnormal -            Psychiatric:       [x] Normal Affect [] Abnormal -        [x] No Hallucinations      Assessment & Plan: This patient with lumbar spinal stenosis that gives him back and leg pain. He has tried Robaxin 750 which does not help his back and he is tried Flexeril which does help his back but makes him sleepy.   He does not wish to try any other antiarrhythmics and then to try him on baclofen to see if that will help with his back as muscle relaxers seem to help him. I have sent a prescription to his pharmacy and we will see him back in 3 months sooner if needed. At that time we may consider getting a new MRI if he truly wants to consider surgery. Diagnoses and all orders for this visit:    1. Chronic bilateral low back pain with bilateral sciatica  -     baclofen (LIORESAL) 10 mg tablet; Take 1 Tab by mouth three (3) times daily. Indications: muscle spasms caused by a spinal disease    2. Spinal stenosis of lumbar region with neurogenic claudication  -     baclofen (LIORESAL) 10 mg tablet; Take 1 Tab by mouth three (3) times daily. Indications: muscle spasms caused by a spinal disease    3. Muscle spasm  -     baclofen (LIORESAL) 10 mg tablet; Take 1 Tab by mouth three (3) times daily. Indications: muscle spasms caused by a spinal disease          Pain Scale: /10           has been reviewed and is appropriate            We discussed the expected course, resolution and complications of the diagnosis(es) in detail. Medication risks, benefits, costs, interactions, and alternatives were discussed as indicated. I advised him to contact the office if his condition worsens, changes or fails to improve as anticipated. For emergencies or worsening neurological symptoms the patient was instructed to call 911. He expressed understanding with the diagnosis(es) and plan. Follow-up and Dispositions    · Return in about 3 months (around 9/2/2020) for with NP.           1    Moo Eid is a 62 y.o. male being evaluated by a video visit encounter for concerns as above. A caregiver was present when appropriate. Due to this being a TeleHealth encounter (During [facility] public health emergency), evaluation of the following organ systems was limited: Vitals/Constitutional/EENT/Resp/CV/GI//MS/Neuro/Skin/Heme-Lymph-Imm.   Pursuant to the emergency declaration under the [facility] [place], [facility] authority and the [employer] Act, this Virtual  Visit was conducted, with patient's (and/or legal guardian's) consent, to reduce the patient's risk of exposure to COVID-19 and provide necessary medical care. CPT Codes 38538-46378 for Established Patients may apply to this Telehealth Visit  Time-based coding, delete if not needed: I spent at least 15 minutes with this established patient, and >50% of the time was spent counseling and/or coordinating care regarding Back pain  Start time: 8:50 AM and Stop time: 9:12 AM.        Due to this being a TeleHealth evaluation, many elements of the physical examination are unable to be assessed. Pursuant to the emergency declaration under the [address] [place] [address] authority and the [facility] and [employer] General Act, this Virtual  Visit was conducted, with patient's consent, to reduce the patient's risk of exposure to COVID-19 and provide continuity of care for an established patient. Services were provided through a video synchronous discussion virtually to substitute for in-person clinic visit.     Wayne Bell NP

## 2020-06-19 ENCOUNTER — HOSPITAL ENCOUNTER (OUTPATIENT)
Dept: LAB | Age: 60
Discharge: HOME | End: 2020-06-19
Payer: MEDICARE

## 2020-06-19 LAB
ALBUMIN SERPL-MCNC: 4 G/DL (ref 3.4–5)
ALBUMIN/GLOB SERPL: 1.2 {RATIO} (ref 0.8–1.7)
ALP SERPL-CCNC: 49 U/L (ref 45–117)
ALT SERPL-CCNC: 24 U/L (ref 16–61)
ANION GAP SERPL CALC-SCNC: 9 MMOL/L (ref 3–18)
AST SERPL-CCNC: 20 U/L (ref 10–38)
BASOPHILS # BLD: 0.1 K/UL (ref 0–0.1)
BASOPHILS NFR BLD: 1 % (ref 0–2)
BILIRUB SERPL-MCNC: 0.6 MG/DL (ref 0.2–1)
BUN SERPL-MCNC: 19 MG/DL (ref 7–18)
BUN/CREAT SERPL: 20 (ref 12–20)
CALCIUM SERPL-MCNC: 9.1 MG/DL (ref 8.5–10.1)
CHLORIDE SERPL-SCNC: 106 MMOL/L (ref 100–111)
CHOLEST SERPL-MCNC: 107 MG/DL
CO2 SERPL-SCNC: 29 MMOL/L (ref 21–32)
CREAT SERPL-MCNC: 0.95 MG/DL (ref 0.6–1.3)
CREAT UR-MCNC: 145 MG/DL (ref 30–125)
DIFFERENTIAL METHOD BLD: ABNORMAL
EOSINOPHIL # BLD: 0.2 K/UL (ref 0–0.4)
EOSINOPHIL NFR BLD: 3 % (ref 0–5)
ERYTHROCYTE [DISTWIDTH] IN BLOOD BY AUTOMATED COUNT: 14.5 % (ref 11.6–14.5)
EST. AVERAGE GLUCOSE BLD GHB EST-MCNC: 120 MG/DL
GLOBULIN SER CALC-MCNC: 3.4 G/DL (ref 2–4)
GLUCOSE SERPL-MCNC: 74 MG/DL (ref 74–99)
HBA1C MFR BLD: 5.8 % (ref 4.2–5.6)
HCT VFR BLD AUTO: 36 % (ref 36–48)
HDLC SERPL-MCNC: 45 MG/DL (ref 40–60)
HDLC SERPL: 2.4 {RATIO} (ref 0–5)
HGB BLD-MCNC: 11.9 G/DL (ref 13–16)
LDLC SERPL CALC-MCNC: 54.6 MG/DL (ref 0–100)
LIPID PROFILE,FLP: NORMAL
LYMPHOCYTES # BLD: 2.5 K/UL (ref 0.9–3.6)
LYMPHOCYTES NFR BLD: 33 % (ref 21–52)
MCH RBC QN AUTO: 28.1 PG (ref 24–34)
MCHC RBC AUTO-ENTMCNC: 33.1 G/DL (ref 31–37)
MCV RBC AUTO: 84.9 FL (ref 74–97)
MICROALBUMIN UR-MCNC: 0.72 MG/DL (ref 0–3)
MICROALBUMIN/CREAT UR-RTO: 5 MG/G (ref 0–30)
MONOCYTES # BLD: 0.5 K/UL (ref 0.05–1.2)
MONOCYTES NFR BLD: 7 % (ref 3–10)
NEUTS SEG # BLD: 4.3 K/UL (ref 1.8–8)
NEUTS SEG NFR BLD: 56 % (ref 40–73)
PLATELET # BLD AUTO: 203 K/UL (ref 135–420)
PMV BLD AUTO: 10.3 FL (ref 9.2–11.8)
POTASSIUM SERPL-SCNC: 3.9 MMOL/L (ref 3.5–5.5)
PROT SERPL-MCNC: 7.4 G/DL (ref 6.4–8.2)
RBC # BLD AUTO: 4.24 M/UL (ref 4.7–5.5)
SODIUM SERPL-SCNC: 144 MMOL/L (ref 136–145)
T4 SERPL-MCNC: 8.8 UG/DL (ref 4.5–12.1)
TRIGL SERPL-MCNC: 37 MG/DL (ref ?–150)
TSH SERPL DL<=0.05 MIU/L-ACNC: 1.08 UIU/ML (ref 0.36–3.74)
VLDLC SERPL CALC-MCNC: 7.4 MG/DL
WBC # BLD AUTO: 7.5 K/UL (ref 4.6–13.2)

## 2020-06-19 PROCEDURE — 83036 HEMOGLOBIN GLYCOSYLATED A1C: CPT

## 2020-06-19 PROCEDURE — 85025 COMPLETE CBC W/AUTO DIFF WBC: CPT

## 2020-06-19 PROCEDURE — 80053 COMPREHEN METABOLIC PANEL: CPT

## 2020-06-19 PROCEDURE — 80061 LIPID PANEL: CPT

## 2020-06-19 PROCEDURE — 84436 ASSAY OF TOTAL THYROXINE: CPT

## 2020-06-19 PROCEDURE — 82043 UR ALBUMIN QUANTITATIVE: CPT

## 2020-06-19 PROCEDURE — 36415 COLL VENOUS BLD VENIPUNCTURE: CPT

## 2020-07-01 ENCOUNTER — HOSPITAL ENCOUNTER (EMERGENCY)
Age: 60
Discharge: HOME | End: 2020-07-01
Attending: EMERGENCY MEDICINE
Payer: MEDICARE

## 2020-07-01 VITALS
OXYGEN SATURATION: 100 % | TEMPERATURE: 99 F | HEART RATE: 72 BPM | SYSTOLIC BLOOD PRESSURE: 139 MMHG | DIASTOLIC BLOOD PRESSURE: 74 MMHG | HEIGHT: 66 IN | WEIGHT: 165 LBS | RESPIRATION RATE: 16 BRPM | BODY MASS INDEX: 26.52 KG/M2

## 2020-07-01 DIAGNOSIS — S39.012A ACUTE MYOFASCIAL STRAIN OF LUMBAR REGION, INITIAL ENCOUNTER: Primary | ICD-10-CM

## 2020-07-01 PROCEDURE — 99282 EMERGENCY DEPT VISIT SF MDM: CPT

## 2020-07-01 RX ORDER — METHOCARBAMOL 750 MG/1
750 TABLET, FILM COATED ORAL
Qty: 10 TAB | Refills: 0 | Status: SHIPPED | OUTPATIENT
Start: 2020-07-01 | End: 2020-07-06

## 2020-07-01 NOTE — DISCHARGE INSTRUCTIONS
Patient Education      If you were prescribed any medication take as directed. Do not drive or use heavy equipment if prescribed narcotics or any medication that can make you drowsy. Follow up with your primary care physician or with specialist as directed. Return to the emergency room with any new or worsening conditions. Back Strain: Care Instructions  Overview     A back strain happens when you overstretch, or pull, a muscle in your back. You may hurt your back in an accident or when you exercise or lift something. Sometimes you may not know how you hurt your back. Most back pain will get better with rest and time. You can take care of yourself at home to help your back heal.  Follow-up care is a key part of your treatment and safety. Be sure to make and go to all appointments, and call your doctor if you are having problems. It's also a good idea to know your test results and keep a list of the medicines you take. How can you care for yourself at home? · Try to stay as active as you can, but stop or reduce any activity that causes pain. · Put ice or a cold pack on the sore muscle for 10 to 20 minutes at a time to stop swelling. Try this every 1 to 2 hours for 3 days (when you are awake) or until the swelling goes down. Put a thin cloth between the ice pack and your skin. · After 2 or 3 days, apply a heating pad on low or a warm cloth to your back. Some doctors suggest that you go back and forth between hot and cold treatments. · Take pain medicines exactly as directed. ? If the doctor gave you a prescription medicine for pain, take it as prescribed. ? If you are not taking a prescription pain medicine, ask your doctor if you can take an over-the-counter medicine. · Try sleeping on your side with a pillow between your legs. Or put a pillow under your knees when you lie on your back. These measures can ease pain in your lower back. · Return to your usual level of activity slowly.   When should you call for help? [id number] anytime you think you may need emergency care. For example, call if:  · You are unable to move a leg at all. Call your doctor now or seek immediate medical care if:  · You have new or worse symptoms in your legs, belly, or buttocks. Symptoms may include:  ? Numbness or tingling. ? Weakness. ? Pain. · You lose bladder or bowel control. Watch closely for changes in your health, and be sure to contact your doctor if:  · You have a fever, lose weight, or don't feel well. · You are not getting better as expected. Where can you learn more? Go to http://zulma-susana.info/  Enter R533 in the search box to learn more about \"Back Strain: Care Instructions. \"  Current as of: March 2, 2020               Content Version: 12.5  © 3454-9566 [employer]. Care instructions adapted under license by [employer] (which disclaims liability or warranty for this information). If you have questions about a medical condition or this instruction, always ask your healthcare professional. [address] any warranty or liability for your use of this information.

## 2020-07-01 NOTE — ED NOTES
Zehra Higuera. is a 62 y.o. male that was discharged in stable condition. The patients diagnosis, condition and treatment were explained to  patient and aftercare instructions were given. The patient verbalized understanding. Patient armband removed and shredded.

## 2020-07-01 NOTE — ED PROVIDER NOTES
EMERGENCY DEPARTMENT HISTORY AND PHYSICAL EXAM    12:10 PM      Date: 7/1/2020  Patient Name: Oliver Muro History of Presenting Illness     Chief Complaint   Patient presents with    Flank Pain         History Provided By: Patient    Additional History (Context): Oliver Muro is a 62 y.o. male with Medical history hypertension, low back pain, radiculopathy, migraines, diabetes who presents with complaint of left-sided low back pain for the past few days. States that a few days ago he was lifting some heavy oil cans and felt a pull in his left side of his back. He placed the cans down but then tried to pick them up again which made the pain worse. He has been taking Goody powders with no relief. Denies abdominal pain, chest pain, shortness of breath, numbness, bladder or bowel dysfunction, weakness, dysuria, hematuria, dizziness, and no other complaint.     PCP: Calin Chadwick MD        Past History     Past Medical History:  Past Medical History:   Diagnosis Date    Diabetes mellitus, new onset ([facility] [address] 11/11/2019    Hypertension     Liver disease     Hepatitis C    Low back pain     Lumbar stenosis     L3-L4, L4-L5    Migraine headache     Numbness and tingling in left hand     Radiculopathy     Right L4-L5    Right leg pain     Sensation of cold in leg     Radiating into right posterior buttock and anterior thigh    Unsteady gait        Past Surgical History:  Past Surgical History:   Procedure Laterality Date    COLONOSCOPY N/A 11/6/2018    COLONOSCOPY, SCREENING /c Bx Polypectomy /c Hot Snared Polypectomy performed by Marco Tejada MD at [facility] 11/9/2018    SIGMOIDOSCOPY FLEXIBLE: ENDO CLIP APPLICATION performed by Marco Tejada MD at [address] [place] [address]  05/23/2016    ACDF C3/4 C4/5 C5/6 C6/7, Dr. Olivier Liang  05/25/16    hematoma removal from cervical spine       Family History:  Family History   Problem Relation Age of Onset    Hypertension Mother     Heart Disease Mother     Diabetes Mother     Hypertension Father        Social History:  Social History     Tobacco Use    Smoking status: Former Smoker     Packs/day: 0.50     Years: 40.00     Pack years: 20.00     Types: Cigarettes, Pipe     Last attempt to quit: 2016     Years since quittin.2    Smokeless tobacco: Never Used   Substance Use Topics    Alcohol use: Yes     Alcohol/week: 4.0 standard drinks     Types: 4 Glasses of wine per week     Comment: Daily- wine and beer    Drug use: No       Allergies: Allergies   Allergen Reactions    Cymbalta [Duloxetine] Itching    Percocet [Oxycodone-Acetaminophen] Itching         Review of Systems       Review of Systems   Constitutional: Negative for chills and fever. HENT: Negative for congestion, sore throat and trouble swallowing. Respiratory: Negative for cough, shortness of breath and wheezing. Cardiovascular: Negative for chest pain and leg swelling. Gastrointestinal: Negative for abdominal pain, nausea and vomiting. Genitourinary: Negative for difficulty urinating and dysuria. Musculoskeletal: Positive for back pain. Negative for arthralgias, gait problem, neck pain and neck stiffness. Skin: Negative for rash. Neurological: Negative for dizziness, syncope, weakness, numbness and headaches. Psychiatric/Behavioral: Negative for confusion and dysphoric mood. All other systems reviewed and are negative. Physical Exam     Visit Vitals  /74   Pulse 72   Temp 99 °F (37.2 °C)   Resp 16   Ht 5' 6\" (1.676 m)   Wt 74.8 kg (165 lb)   SpO2 100%   BMI 26.63 kg/m²         Physical Exam  Vitals signs and nursing note reviewed. Constitutional:       General: He is not in acute distress. Appearance: Normal appearance. He is well-developed. He is not diaphoretic. HENT:      Head: Normocephalic and atraumatic.    Eyes:      General: No scleral icterus. Conjunctiva/sclera: Conjunctivae normal.      Pupils: Pupils are equal, round, and reactive to light. Neck:      Musculoskeletal: Normal range of motion and neck supple. Cardiovascular:      Rate and Rhythm: Normal rate. Pulses: Normal pulses. Comments: Capillary refill < 3 seconds  Pulmonary:      Effort: Pulmonary effort is normal. No respiratory distress. Breath sounds: Normal breath sounds. No wheezing. Abdominal:      General: Bowel sounds are normal. There is no distension. Palpations: Abdomen is soft. Tenderness: There is no abdominal tenderness. Musculoskeletal: Normal range of motion. General: Tenderness present. No swelling or deformity. Comments: Tenderness of the left paraspinal lumbar region  No midline spinal tenderness  Full range of motion upper back but does get pain left paraspinal lumbar with movement    Has full range of motion bilateral upper and lower extremities   Lymphadenopathy:      Cervical: No cervical adenopathy. Skin:     General: Skin is warm and dry. Capillary Refill: Capillary refill takes less than 2 seconds. Coloration: Skin is not pale. Neurological:      General: No focal deficit present. Mental Status: He is alert and oriented to person, place, and time. Cranial Nerves: No cranial nerve deficit. Sensory: No sensory deficit. Motor: No weakness. Coordination: Coordination normal.           Diagnostic Study Results     Labs -  No results found for this or any previous visit (from the past 12 hour(s)). Radiologic Studies -   No orders to display         Medical Decision Making   I am the first provider for this patient. I reviewed the vital signs, available nursing notes, past medical history, past surgical history, family history and social history. Vital Signs-Reviewed the patient's vital signs.         Records Reviewed: Nursing Notes and Old Medical Records (Time of Review: 12:33 PM)    Provider Notes (Medical Decision Making): DDX: Strain versus spasm      MDM    Medications - No data to display        ED Course: Progress Notes, Reevaluation, and Consults:  I have reassessed the patient. I have discussed the workup, results and plan with the patient and patient is in agreement. Patient will be prescribed Robaxin. Patient was discharge in stable condition. Patient was given outpatient follow up. Patient is to return to emergency department if any new or worsening condition. Diagnosis     Clinical Impression:   1. Acute myofascial strain of lumbar region, initial encounter        Disposition: Discharged    Follow-up Information     Follow up With Specialties Details Why Contact Info    Frankie Uribe MD Internal Medicine Schedule an appointment as soon as possible for a visit in 3 days  [address] [place] [address] [address] [address]      [address] [facility] EMERGENCY DEPT Emergency Medicine  As needed, If symptoms worsen [address]  [phone]           Discharge Medication List as of 7/1/2020 12:48 PM      START taking these medications    Details   methocarbamoL (Robaxin-750) 750 mg tablet Take 1 Tab by mouth two (2) times daily as needed (muscle spasms; musculoskeletal pain) for up to 5 days. , Print, Disp-10 Tab, R-0         CONTINUE these medications which have NOT CHANGED    Details   fluticasone propionate (FLONASE NA) by Nasal route., Historical Med      lisinopril (PRINIVIL, ZESTRIL) 20 mg tablet Take 1 Tab by mouth daily. , Normal, Disp-30 Tab, R-1      metFORMIN (GLUCOPHAGE) 500 mg tablet Take 1 Tab by mouth two (2) times daily (with meals). Indications: type 2 diabetes mellitus, Normal, Disp-100 Tab, R-1      oxyCODONE IR (ROXICODONE) 5 mg immediate release tablet Historical Med      amLODIPine (NORVASC) 5 mg tablet Take 5 mg by mouth daily. , Historical Med      baclofen (LIORESAL) 10 mg tablet Take 1 Tab by mouth three (3) times daily. Indications: muscle spasms caused by a spinal disease, Normal, Disp-90 Tab, R-0      insulin lispro (HUMALOG) 100 unit/mL injection Sliding scale    180 and above 2 units  250 and above 4 units  299 and above 6 units  300 and above 8 units  350 and above 10 units and call Dr Kathi Pedroza PCP  Indications: type 2 diabetes mellitus, Print, Disp-1 Vial, R-1      insulin glargine (LANTUS) 100 unit/mL injection 22 units hs, Normal, Disp-1 Vial, R-1               DO Fallon Wallis medical dictation software was used for portions of this report. Unintended transcription errors may occur. My signature above authenticates this document and my orders, the final    diagnosis (es), discharge prescription (s), and instructions in the Epic    record.

## 2020-07-01 NOTE — ED TRIAGE NOTES
Patient c/o left flank pain x 2 days, patient state he was putting gas in the car and holding a heavy container and he felt some burning. Patient took some Goodies yesterday.

## 2020-08-03 ENCOUNTER — HOSPITAL ENCOUNTER (OUTPATIENT)
Dept: LAB | Age: 60
Discharge: HOME | End: 2020-08-03
Payer: MEDICARE

## 2020-08-03 LAB
ANION GAP SERPL CALC-SCNC: 3 MMOL/L (ref 3–18)
BUN SERPL-MCNC: 19 MG/DL (ref 7–18)
BUN/CREAT SERPL: 18 (ref 12–20)
CALCIUM SERPL-MCNC: 9.5 MG/DL (ref 8.5–10.1)
CHLORIDE SERPL-SCNC: 105 MMOL/L (ref 100–111)
CO2 SERPL-SCNC: 32 MMOL/L (ref 21–32)
CREAT SERPL-MCNC: 1.08 MG/DL (ref 0.6–1.3)
GLUCOSE SERPL-MCNC: 89 MG/DL (ref 74–99)
POTASSIUM SERPL-SCNC: 4.6 MMOL/L (ref 3.5–5.5)
SODIUM SERPL-SCNC: 140 MMOL/L (ref 136–145)

## 2020-08-03 PROCEDURE — 80048 BASIC METABOLIC PNL TOTAL CA: CPT

## 2020-08-03 PROCEDURE — 36415 COLL VENOUS BLD VENIPUNCTURE: CPT

## 2020-08-03 PROCEDURE — 84681 ASSAY OF C-PEPTIDE: CPT

## 2020-08-04 LAB — C PEPTIDE SERPL-MCNC: 2.1 NG/ML (ref 1.1–4.4)

## 2020-10-30 ENCOUNTER — HOSPITAL ENCOUNTER (OUTPATIENT)
Dept: LAB | Age: 60
Discharge: HOME | End: 2020-10-30
Payer: MEDICARE

## 2020-10-30 LAB
ALBUMIN SERPL-MCNC: 3.8 G/DL (ref 3.4–5)
ALBUMIN/GLOB SERPL: 1.2 {RATIO} (ref 0.8–1.7)
ALP SERPL-CCNC: 64 U/L (ref 45–117)
ALT SERPL-CCNC: 20 U/L (ref 16–61)
ANION GAP SERPL CALC-SCNC: 5 MMOL/L (ref 3–18)
AST SERPL-CCNC: 20 U/L (ref 10–38)
BILIRUB SERPL-MCNC: 0.8 MG/DL (ref 0.2–1)
BUN SERPL-MCNC: 15 MG/DL (ref 7–18)
BUN/CREAT SERPL: 15 (ref 12–20)
CALCIUM SERPL-MCNC: 9.2 MG/DL (ref 8.5–10.1)
CHLORIDE SERPL-SCNC: 108 MMOL/L (ref 100–111)
CO2 SERPL-SCNC: 28 MMOL/L (ref 21–32)
CREAT SERPL-MCNC: 0.98 MG/DL (ref 0.6–1.3)
EST. AVERAGE GLUCOSE BLD GHB EST-MCNC: 114 MG/DL
GLOBULIN SER CALC-MCNC: 3.2 G/DL (ref 2–4)
GLUCOSE SERPL-MCNC: 90 MG/DL (ref 74–99)
HBA1C MFR BLD: 5.6 % (ref 4.2–5.6)
POTASSIUM SERPL-SCNC: 4.1 MMOL/L (ref 3.5–5.5)
PROT SERPL-MCNC: 7 G/DL (ref 6.4–8.2)
SODIUM SERPL-SCNC: 141 MMOL/L (ref 136–145)

## 2020-10-30 PROCEDURE — 80053 COMPREHEN METABOLIC PANEL: CPT

## 2020-10-30 PROCEDURE — 83036 HEMOGLOBIN GLYCOSYLATED A1C: CPT

## 2020-10-30 PROCEDURE — 36415 COLL VENOUS BLD VENIPUNCTURE: CPT

## 2021-03-19 ENCOUNTER — HOSPITAL ENCOUNTER (OUTPATIENT)
Dept: LAB | Age: 61
Discharge: HOME | End: 2021-03-19
Payer: MEDICARE

## 2021-03-19 LAB
ANION GAP SERPL CALC-SCNC: 5 MMOL/L (ref 3–18)
BASOPHILS # BLD: 0.1 K/UL (ref 0–0.1)
BASOPHILS NFR BLD: 1 % (ref 0–2)
BUN SERPL-MCNC: 12 MG/DL (ref 7–18)
BUN/CREAT SERPL: 12 (ref 12–20)
CALCIUM SERPL-MCNC: 9.1 MG/DL (ref 8.5–10.1)
CHLORIDE SERPL-SCNC: 106 MMOL/L (ref 100–111)
CO2 SERPL-SCNC: 29 MMOL/L (ref 21–32)
CREAT SERPL-MCNC: 1 MG/DL (ref 0.6–1.3)
DIFFERENTIAL METHOD BLD: ABNORMAL
EOSINOPHIL # BLD: 0.4 K/UL (ref 0–0.4)
EOSINOPHIL NFR BLD: 5 % (ref 0–5)
ERYTHROCYTE [DISTWIDTH] IN BLOOD BY AUTOMATED COUNT: 13.4 % (ref 11.6–14.5)
GLUCOSE SERPL-MCNC: 116 MG/DL (ref 74–99)
HCT VFR BLD AUTO: 39.3 % (ref 36–48)
HGB BLD-MCNC: 13.3 G/DL (ref 13–16)
INR PPP: 1 (ref 0.8–1.2)
LYMPHOCYTES # BLD: 3 K/UL (ref 0.9–3.6)
LYMPHOCYTES NFR BLD: 32 % (ref 21–52)
MCH RBC QN AUTO: 28.4 PG (ref 24–34)
MCHC RBC AUTO-ENTMCNC: 33.8 G/DL (ref 31–37)
MCV RBC AUTO: 84 FL (ref 74–97)
MONOCYTES # BLD: 0.6 K/UL (ref 0.05–1.2)
MONOCYTES NFR BLD: 6 % (ref 3–10)
NEUTS SEG # BLD: 5.4 K/UL (ref 1.8–8)
NEUTS SEG NFR BLD: 56 % (ref 40–73)
PLATELET # BLD AUTO: 202 K/UL (ref 135–420)
PMV BLD AUTO: 9.9 FL (ref 9.2–11.8)
POTASSIUM SERPL-SCNC: 4.4 MMOL/L (ref 3.5–5.5)
PROTHROMBIN TIME: 13.2 SEC (ref 11.5–15.2)
RBC # BLD AUTO: 4.68 M/UL (ref 4.7–5.5)
SODIUM SERPL-SCNC: 140 MMOL/L (ref 136–145)
WBC # BLD AUTO: 9.5 K/UL (ref 4.6–13.2)

## 2021-03-19 PROCEDURE — 85025 COMPLETE CBC W/AUTO DIFF WBC: CPT

## 2021-03-19 PROCEDURE — 80048 BASIC METABOLIC PNL TOTAL CA: CPT

## 2021-03-19 PROCEDURE — 36415 COLL VENOUS BLD VENIPUNCTURE: CPT

## 2021-03-19 PROCEDURE — 85610 PROTHROMBIN TIME: CPT

## 2021-03-26 ENCOUNTER — APPOINTMENT (OUTPATIENT)
Dept: GENERAL RADIOLOGY | Age: 61
End: 2021-03-26
Attending: PHYSICIAN ASSISTANT
Payer: MEDICARE

## 2021-03-26 ENCOUNTER — HOSPITAL ENCOUNTER (EMERGENCY)
Age: 61
Discharge: HOME | End: 2021-03-26
Attending: EMERGENCY MEDICINE
Payer: MEDICARE

## 2021-03-26 VITALS
BODY MASS INDEX: 26.52 KG/M2 | DIASTOLIC BLOOD PRESSURE: 83 MMHG | OXYGEN SATURATION: 99 % | TEMPERATURE: 98.2 F | SYSTOLIC BLOOD PRESSURE: 120 MMHG | WEIGHT: 165 LBS | HEIGHT: 66 IN | RESPIRATION RATE: 16 BRPM | HEART RATE: 84 BPM

## 2021-03-26 DIAGNOSIS — R07.89 ATYPICAL CHEST PAIN: Primary | ICD-10-CM

## 2021-03-26 LAB
ALBUMIN SERPL-MCNC: 4.4 G/DL (ref 3.4–5)
ALBUMIN/GLOB SERPL: 1.1 {RATIO} (ref 0.8–1.7)
ALP SERPL-CCNC: 68 U/L (ref 45–117)
ALT SERPL-CCNC: 26 U/L (ref 16–61)
ANION GAP SERPL CALC-SCNC: 5 MMOL/L (ref 3–18)
AST SERPL-CCNC: 30 U/L (ref 10–38)
BASOPHILS # BLD: 0.1 K/UL (ref 0–0.1)
BASOPHILS NFR BLD: 1 % (ref 0–2)
BILIRUB SERPL-MCNC: 0.5 MG/DL (ref 0.2–1)
BNP SERPL-MCNC: 13 PG/ML (ref 0–900)
BUN SERPL-MCNC: 17 MG/DL (ref 7–18)
BUN/CREAT SERPL: 17 (ref 12–20)
CALCIUM SERPL-MCNC: 9.4 MG/DL (ref 8.5–10.1)
CHLORIDE SERPL-SCNC: 107 MMOL/L (ref 100–111)
CK MB CFR SERPL CALC: 1.1 % (ref 0–4)
CK MB SERPL-MCNC: 4.3 NG/ML (ref 5–25)
CK SERPL-CCNC: 401 U/L (ref 39–308)
CO2 SERPL-SCNC: 29 MMOL/L (ref 21–32)
CREAT SERPL-MCNC: 1.01 MG/DL (ref 0.6–1.3)
DIFFERENTIAL METHOD BLD: NORMAL
EOSINOPHIL # BLD: 0.3 K/UL (ref 0–0.4)
EOSINOPHIL NFR BLD: 4 % (ref 0–5)
ERYTHROCYTE [DISTWIDTH] IN BLOOD BY AUTOMATED COUNT: 13.4 % (ref 11.6–14.5)
GLOBULIN SER CALC-MCNC: 3.9 G/DL (ref 2–4)
GLUCOSE SERPL-MCNC: 94 MG/DL (ref 74–99)
HCT VFR BLD AUTO: 40.6 % (ref 36–48)
HGB BLD-MCNC: 14.1 G/DL (ref 13–16)
LYMPHOCYTES # BLD: 3 K/UL (ref 0.9–3.6)
LYMPHOCYTES NFR BLD: 37 % (ref 21–52)
MCH RBC QN AUTO: 29 PG (ref 24–34)
MCHC RBC AUTO-ENTMCNC: 34.7 G/DL (ref 31–37)
MCV RBC AUTO: 83.4 FL (ref 74–97)
MONOCYTES # BLD: 0.5 K/UL (ref 0.05–1.2)
MONOCYTES NFR BLD: 6 % (ref 3–10)
NEUTS SEG # BLD: 4.4 K/UL (ref 1.8–8)
NEUTS SEG NFR BLD: 52 % (ref 40–73)
PLATELET # BLD AUTO: 209 K/UL (ref 135–420)
PMV BLD AUTO: 10.1 FL (ref 9.2–11.8)
POTASSIUM SERPL-SCNC: 4.7 MMOL/L (ref 3.5–5.5)
PROT SERPL-MCNC: 8.3 G/DL (ref 6.4–8.2)
RBC # BLD AUTO: 4.87 M/UL (ref 4.7–5.5)
SODIUM SERPL-SCNC: 141 MMOL/L (ref 136–145)
TROPONIN I SERPL-MCNC: <0.02 NG/ML (ref 0–0.04)
WBC # BLD AUTO: 8.2 K/UL (ref 4.6–13.2)

## 2021-03-26 PROCEDURE — 85025 COMPLETE CBC W/AUTO DIFF WBC: CPT

## 2021-03-26 PROCEDURE — 71046 X-RAY EXAM CHEST 2 VIEWS: CPT

## 2021-03-26 PROCEDURE — 99282 EMERGENCY DEPT VISIT SF MDM: CPT

## 2021-03-26 PROCEDURE — 80053 COMPREHEN METABOLIC PANEL: CPT

## 2021-03-26 PROCEDURE — 83880 ASSAY OF NATRIURETIC PEPTIDE: CPT

## 2021-03-26 PROCEDURE — 93005 ELECTROCARDIOGRAM TRACING: CPT

## 2021-03-26 PROCEDURE — 82553 CREATINE MB FRACTION: CPT

## 2021-03-26 NOTE — DISCHARGE INSTRUCTIONS
Thank you for choosing [facility] for your emergency care. You came in today for left sided chest pressure. This has been going on for several months, and is worse when you are sitting down for long period of time. Based on your exam and your EKG (electrocardiogram, electrical activity of your heart), chest x-ray, labs, history, I do not think any life-threatening or urgent/emergent cause for your discomfort. You have a family history of breast cancer and breast cancer in males is very rare, but this is something I did not evaluate for except for not feeling any lumps or bumps in your breast or your armpit. You can consider discussing this with your primary care doctor. It is very important that you follow-up with your primary care doctor. We are seeing you for a short period of time, and things change. Return to the emergency room if you have any worsening chest pain, associated shortness of breath, nausea or vomiting, any new or concerning symptoms, difficulty walking, weakness, numbness, unable to urinate, unable to eat or drink, severe headache, severe abdominal pain.

## 2021-03-26 NOTE — ED PROVIDER NOTES
EMERGENCY DEPARTMENT HISTORY AND PHYSICAL EXAM      Date: 3/26/2021  Patient Name: Morena Leyva. History of Presenting Illness     Chief Complaint   Patient presents with    Chest Pain       History Provided By: Patient    HPI: Morena Leyva., 56 y.o. male with history of PAD, hypertension, type 2 diabetes mellitus (no longer needing medication), presents to the ED with cc of chest pressure. Patient states since at least January, he has had a sensation of left chest/breast pressure after prolonged sitting. He states this improves when he gets up and walks around. He denies other chest pain. He reports the discomfort as a 1 out of 10. It lasts a few minutes at a time, does not radiate. He denies associated nausea, vomiting, palpitations, shortness of breath, diaphoresis. He states that he occasionally gets nauseous after eating, states it lasts about 1 second. Denies vomiting. Patient states that he had an echocardiogram in January, and was told that it was normal.    On chart reviewit appears he had a cardiac stress test 8/2020. The interpretation summary states that there were upsloping ST depressions, nonspecific for ischemia in the echocardiographic interpretation was hyperkinetic wall motionthe overall impression was negative for ischemia. Patient also had lower extremity ultrasounds that showed moderate arterial disease within bilateral lower extremities at rest, within the aortoiliac segment on the right and femoral-popliteal segment on the left. Patient is to have arteriogram for this poor circulation next week. There are no other complaints, changes, or physical findings at this time. PCP: Dr. Alise Perez  Vascular surgeon: Dr. Anahi Leal    Medications:  Patient reports his current medications are lisinopril, ASA, amlodipine, statin, oxycodone. He denies baclofen, insulin, metformin.     Past History     Past Medical History:  Past Medical History:   Diagnosis Date    Diabetes mellitus, new onset ([facility] [address] 2019    Hypertension     Liver disease     Hepatitis C    Low back pain     Lumbar stenosis     L3-L4, L4-L5    Migraine headache     Numbness and tingling in left hand     Radiculopathy     Right L4-L5    Right leg pain     Sensation of cold in leg     Radiating into right posterior buttock and anterior thigh    Unsteady gait        Past Surgical History:  Past Surgical History:   Procedure Laterality Date    COLONOSCOPY N/A 2018    COLONOSCOPY, SCREENING /c Bx Polypectomy /c Hot Snared Polypectomy [facility] N/A 2018    SIGMOIDOSCOPY FLEXIBLE: ENDO CLIP APPLICATION performed by Pauline Boykin MD at [facility] MAIN OR    HX CERVICAL FUSION  2016    ACDF C3/4 C4/5 C5/6 C6/7, Dr. Unique Sun  14    hematoma removal from cervical spine       Family History:  Family History   Problem Relation Age of Onset    Hypertension Mother     Heart Disease Mother     Diabetes Mother     Hypertension Father        Social History:  Social History     Tobacco Use    Smoking status: Former Smoker     Packs/day: 0.50     Years: 40.00     Pack years: 20.00     Types: Cigarettes, Pipe     Quit date: 2016     Years since quittin.9    Smokeless tobacco: Never Used   Substance Use Topics    Alcohol use: Yes     Alcohol/week: 4.0 standard drinks     Types: 4 Glasses of wine per week     Comment: Daily- wine and beer    Drug use: No   Patient states that he quit smoking about 7 years ago. He reports an occasional drink about once per month. He denies drug use including cocaine, heroin, marijuana, methamphetamines. Allergies:   Allergies   Allergen Reactions    Cymbalta [Duloxetine] Itching    Percocet [Oxycodone-Acetaminophen] Itching    Tomato Itching         Review of Systems   Review of Systems   Constitutional: Negative for chills, fever and unexpected weight change. HENT: Negative for sore throat and trouble swallowing. Eyes: Negative for pain and visual disturbance. Respiratory: Negative for cough and shortness of breath. Cardiovascular: Positive for chest pain. Negative for palpitations and leg swelling. Gastrointestinal: Negative for blood in stool, diarrhea and vomiting. Genitourinary: Negative for dysuria and hematuria. Musculoskeletal: Negative for neck pain and neck stiffness. Skin: Negative for rash and wound. Neurological: Negative for syncope, weakness and numbness. Hematological: Negative for adenopathy. Does not bruise/bleed easily. Physical Exam   Physical Exam  Vitals signs and nursing note reviewed. Constitutional:       Appearance: He is well-developed. He is not diaphoretic. HENT:      Head: Normocephalic and atraumatic. Nose: No congestion or rhinorrhea. Mouth/Throat:      Mouth: Mucous membranes are moist.      Pharynx: Oropharynx is clear. Eyes:      General: No scleral icterus. Extraocular Movements: Extraocular movements intact. Pupils: Pupils are equal, round, and reactive to light. Neck:      Musculoskeletal: Neck supple. Vascular: No JVD. Cardiovascular:      Rate and Rhythm: Normal rate and regular rhythm. Heart sounds: No murmur. Pulmonary:      Effort: Pulmonary effort is normal.      Breath sounds: No wheezing, rhonchi or rales. Abdominal:      General: There is no distension. Palpations: Abdomen is soft. Tenderness: There is no abdominal tenderness. Musculoskeletal:         General: No tenderness. Right lower leg: No edema. Left lower leg: No edema. Skin:     General: Skin is warm and dry. Capillary Refill: Capillary refill takes less than 2 seconds. Neurological:      General: No focal deficit present. Mental Status: He is alert and oriented to person, place, and time. Sensory: No sensory deficit.       Motor: No weakness. Gait: Gait normal.   Psychiatric:         Mood and Affect: Mood normal.         Behavior: Behavior normal.         Thought Content: Thought content normal.         Judgment: Judgment normal.         Diagnostic Study Results     Labs -     Recent Results (from the past 24 hour(s))   EKG, 12 LEAD, INITIAL    Collection Time: 03/26/21  3:50 PM   Result Value Ref Range    Ventricular Rate 88 BPM    Atrial Rate 88 BPM    P-R Interval 152 ms    QRS Duration 76 ms    Q-T Interval 354 ms    QTC Calculation (Bezet) 428 ms    Calculated P Axis 64 degrees    Calculated R Axis 62 degrees    Calculated T Axis 60 degrees    Diagnosis       Normal sinus rhythm  Possible Left atrial enlargement  Borderline ECG  When compared with ECG of 04-MAY-2018 03:51,  No significant change was found     CBC WITH AUTOMATED DIFF    Collection Time: 03/26/21  4:02 PM   Result Value Ref Range    WBC 8.2 4.6 - 13.2 K/uL    RBC 4.87 4.70 - 5.50 M/uL    HGB 14.1 13.0 - 16.0 g/dL    HCT 40.6 36.0 - 48.0 %    MCV 83.4 74.0 - 97.0 FL    MCH 29.0 24.0 - 34.0 PG    MCHC 34.7 31.0 - 37.0 g/dL    RDW 13.4 11.6 - 14.5 %    PLATELET 437 432 - 589 K/uL    MPV 10.1 9.2 - 11.8 FL    NEUTROPHILS 52 40 - 73 %    LYMPHOCYTES 37 21 - 52 %    MONOCYTES 6 3 - 10 %    EOSINOPHILS 4 0 - 5 %    BASOPHILS 1 0 - 2 %    ABS. NEUTROPHILS 4.4 1.8 - 8.0 K/UL    ABS. LYMPHOCYTES 3.0 0.9 - 3.6 K/UL    ABS. MONOCYTES 0.5 0.05 - 1.2 K/UL    ABS. EOSINOPHILS 0.3 0.0 - 0.4 K/UL    ABS.  BASOPHILS 0.1 0.0 - 0.1 K/UL    DF AUTOMATED     METABOLIC PANEL, COMPREHENSIVE    Collection Time: 03/26/21  4:02 PM   Result Value Ref Range    Sodium 141 136 - 145 mmol/L    Potassium 4.7 3.5 - 5.5 mmol/L    Chloride 107 100 - 111 mmol/L    CO2 29 21 - 32 mmol/L    Anion gap 5 3.0 - 18 mmol/L    Glucose 94 74 - 99 mg/dL    BUN 17 7.0 - 18 MG/DL    Creatinine 1.01 0.6 - 1.3 MG/DL    BUN/Creatinine ratio 17 12 - 20      GFR est AA >60 >60 ml/min/1.73m2    GFR est non-AA >60 >60 ml/min/1.73m2    Calcium 9.4 8.5 - 10.1 MG/DL    Bilirubin, total 0.5 0.2 - 1.0 MG/DL    ALT (SGPT) 26 16 - 61 U/L    AST (SGOT) 30 10 - 38 U/L    Alk. phosphatase 68 45 - 117 U/L    Protein, total 8.3 (H) 6.4 - 8.2 g/dL    Albumin 4.4 3.4 - 5.0 g/dL    Globulin 3.9 2.0 - 4.0 g/dL    A-G Ratio 1.1 0.8 - 1.7     CARDIAC PANEL,(CK, CKMB & TROPONIN)    Collection Time: 03/26/21  4:02 PM   Result Value Ref Range    CK - MB 4.3 (H) <3.6 ng/ml    CK-MB Index 1.1 0.0 - 4.0 %     (H) 39 - 308 U/L    Troponin-I, QT <0.02 0.0 - 0.045 NG/ML   NT-PRO BNP    Collection Time: 03/26/21  4:02 PM   Result Value Ref Range    NT pro-BNP 13 0 - 900 PG/ML       Radiologic Studies -   XR CHEST PA LAT   Final Result   1. No radiographic evidence of acute cardiopulmonary disease. Thank you for enabling us to participate in the care of this patient. CT Results  (Last 48 hours)    None        CXR Results  (Last 48 hours)               03/26/21 1609  XR CHEST PA LAT Final result    Impression:  1. No radiographic evidence of acute cardiopulmonary disease. Thank you for enabling us to participate in the care of this patient. Narrative:  EXAM: CHEST PA AND LATERAL       CLINICAL HISTORY/INDICATION: Chest pain. COMPARISON: 5/4/2018. TECHNIQUE: 2 views. FINDINGS:    LINES/DEVICES: None. HEART/MEDIASTINUM: The cardiomediastinal silhouette is unremarkable in size. LUNGS: No focal airspace opacity suggestive of pneumonia, pulmonary edema,   pneumothorax, or pleural effusion. SOFT TISSUES: Unremarkable. BONES: Partially visualized cervical fusion hardware noted. Mild thoracic   spondylosis. Medical Decision Making   I am the first provider for this patient. I reviewed the vital signs, available nursing notes, past medical history, past surgical history, family history and social history. Vital Signs-Reviewed the patient's vital signs.   Patient Vitals for the past 24 hrs:   Temp Pulse Resp BP SpO2   03/26/21 1548 98.2 °F (36.8 °C) 84 16 120/83 99 %         Records Reviewed: Nursing Notes, Old Medical Records, Previous electrocardiograms, Previous Radiology Studies and Previous Laboratory Studies    Provider Notes (Medical Decision Making)/ED course:   Charlene Gonzales, 56 y.o. male with history of PAD, hypertension, type 2 diabetes mellitus (no longer needing medication), presents to the ED with cc of chest pressure. 1650 Initial assessment performed. The patients presenting problems have been discussed, and they are in agreement with the care plan formulated and outlined with them. I have encouraged them to ask questions as they arise throughout their visit. Patient is very well-appearing, ambulates with ease to examination room. Presents with very atypical chest pain, despite significant PAD history. Low clinical suspicion at this time for ACS. No evidence of overlying skin changes, induration, fluctuance, increased warmth, asymmetry to the right chest, masses or nodules in the left or right chest or left or right axilla. Patient does have a family history of breast cancer, sister passed away in her 62s from breast cancer. Etiology is unclear at this time but will keep differential broad. Differential includes ACS, abscess, breast cancer, valvular pathology, CHF, PE, pericarditis, myocarditis, aortic dissection, cardiac tamponade, PTX, PNA, esophageal rupture. Will obtain CBC, CMP, troponin, proBNP, EKG, chest x-ray. EKG, labs, and imaging personally interpreted as:    1648 EKG interpretation: (Preliminary)  Rhythm: normal sinus rhythm; and regular . Rate (approx.): 88; Axis: normal; MA interval: normal; QRS interval: normal ; ST/T wave: normal; Other findings: normal, similar to prior in 2018. Labs: No leukocytosis, anemia. Normal platelets.   Creatinine 1.01, at baseline on chart review, electrolytes normal.  Troponin normal, CK mildly elevated, patient is on statin. proBNP normal.    Imaging: Chest x-ray without evidence of pleural effusion, pneumothorax, focal infiltrate. Cervical fusion hardware seen. Normal-appearing cardiac silhouette and mediastinum. Discussed findings with the patient. Recommended close PCP follow-up as I was not able to give him a good explanation for his discomfort in his left chest.  Return precautions provided, including worsening chest pain, associated shortness of breath, nausea or vomiting, any new or concerning symptoms, difficulty walking, weakness, numbness, unable to urinate, unable to eat or drink, severe headache, severe abdominal pain. PROCEDURES  None    Disposition:  Home    DISCHARGE PLAN:  1. Discharge Medication List as of 3/26/2021  7:33 PM      No medications indicated at this time. 2.   Follow-up Information     Follow up With Specialties Details Why Jaxon Nicole, please call and schedule an appointment for Monday. Schedule an appointment as soon as possible for a visit in 3 days      [facility] EMERGENCY DEPT Emergency Medicine  As needed [address] [facility]  [phone]        3. Return to ED if worse     Diagnosis     Clinical Impression:   1. Atypical chest pain        Attestations:    Ronald Dawson MD, PGY1    Please note that this dictation was completed with IntelliFlo, the computer voice recognition software. Quite often unanticipated grammatical, syntax, homophones, and other interpretive errors are inadvertently transcribed by the computer software. Please disregard these errors. Please excuse any errors that have escaped final proofreading. Thank you.

## 2021-03-26 NOTE — ED TRIAGE NOTES
Patient presents to ED with c/o chest discomfort that started about 2 weeks ago. He denies N/V or dyspnea.  VSS

## 2021-03-27 LAB
ATRIAL RATE: 88 BPM
CALCULATED P AXIS, ECG09: 64 DEGREES
CALCULATED R AXIS, ECG10: 62 DEGREES
CALCULATED T AXIS, ECG11: 60 DEGREES
DIAGNOSIS, 93000: NORMAL
P-R INTERVAL, ECG05: 152 MS
Q-T INTERVAL, ECG07: 354 MS
QRS DURATION, ECG06: 76 MS
QTC CALCULATION (BEZET), ECG08: 428 MS
VENTRICULAR RATE, ECG03: 88 BPM

## 2021-03-27 NOTE — ED NOTES
Provider reviewed discharge instructions with the patient. The patient verbalized understanding. Patient armband removed and given to patient to take home. Patient was informed of the privacy risks if armband lost or stolen.

## 2021-05-12 ENCOUNTER — HOSPITAL ENCOUNTER (OUTPATIENT)
Dept: PREADMISSION TESTING | Age: 61
Discharge: HOME | End: 2021-05-12
Payer: MEDICARE

## 2021-05-12 LAB
ANION GAP SERPL CALC-SCNC: 2 MMOL/L (ref 3–18)
BASOPHILS # BLD: 0.1 K/UL (ref 0–0.1)
BASOPHILS NFR BLD: 1 % (ref 0–2)
BUN SERPL-MCNC: 15 MG/DL (ref 7–18)
BUN/CREAT SERPL: 15 (ref 12–20)
CALCIUM SERPL-MCNC: 9.1 MG/DL (ref 8.5–10.1)
CHLORIDE SERPL-SCNC: 105 MMOL/L (ref 100–111)
CO2 SERPL-SCNC: 33 MMOL/L (ref 21–32)
CREAT SERPL-MCNC: 0.98 MG/DL (ref 0.6–1.3)
DIFFERENTIAL METHOD BLD: NORMAL
EOSINOPHIL # BLD: 0.3 K/UL (ref 0–0.4)
EOSINOPHIL NFR BLD: 3 % (ref 0–5)
ERYTHROCYTE [DISTWIDTH] IN BLOOD BY AUTOMATED COUNT: 13.2 % (ref 11.6–14.5)
GLUCOSE SERPL-MCNC: 96 MG/DL (ref 74–99)
HCT VFR BLD AUTO: 39.4 % (ref 36–48)
HGB BLD-MCNC: 13 G/DL (ref 13–16)
INR PPP: 1 (ref 0.8–1.2)
LYMPHOCYTES # BLD: 3 K/UL (ref 0.9–3.6)
LYMPHOCYTES NFR BLD: 32 % (ref 21–52)
MCH RBC QN AUTO: 28.3 PG (ref 24–34)
MCHC RBC AUTO-ENTMCNC: 33 G/DL (ref 31–37)
MCV RBC AUTO: 85.7 FL (ref 74–97)
MONOCYTES # BLD: 0.8 K/UL (ref 0.05–1.2)
MONOCYTES NFR BLD: 8 % (ref 3–10)
NEUTS SEG # BLD: 5.1 K/UL (ref 1.8–8)
NEUTS SEG NFR BLD: 55 % (ref 40–73)
PLATELET # BLD AUTO: 184 K/UL (ref 135–420)
PMV BLD AUTO: 10.1 FL (ref 9.2–11.8)
POTASSIUM SERPL-SCNC: 4.4 MMOL/L (ref 3.5–5.5)
PROTHROMBIN TIME: 13.3 SEC (ref 11.5–15.2)
RBC # BLD AUTO: 4.6 M/UL (ref 4.35–5.65)
SODIUM SERPL-SCNC: 140 MMOL/L (ref 136–145)
WBC # BLD AUTO: 9.4 K/UL (ref 4.6–13.2)

## 2021-05-12 PROCEDURE — 85610 PROTHROMBIN TIME: CPT

## 2021-05-12 PROCEDURE — 80048 BASIC METABOLIC PNL TOTAL CA: CPT

## 2021-05-12 PROCEDURE — U0003 INFECTIOUS AGENT DETECTION BY NUCLEIC ACID (DNA OR RNA); SEVERE ACUTE RESPIRATORY SYNDROME CORONAVIRUS 2 (SARS-COV-2) (CORONAVIRUS DISEASE [COVID-19]), AMPLIFIED PROBE TECHNIQUE, MAKING USE OF HIGH THROUGHPUT TECHNOLOGIES AS DESCRIBED BY CMS-2020-01-R: HCPCS

## 2021-05-12 PROCEDURE — 85025 COMPLETE CBC W/AUTO DIFF WBC: CPT

## 2021-05-12 PROCEDURE — 36415 COLL VENOUS BLD VENIPUNCTURE: CPT

## 2021-05-13 LAB — SARS-COV-2, COV2NT: NOT DETECTED

## 2021-06-10 ENCOUNTER — HOSPITAL ENCOUNTER (OUTPATIENT)
Dept: PREADMISSION TESTING | Age: 61
Discharge: HOME | End: 2021-06-10
Payer: MEDICARE

## 2021-06-10 ENCOUNTER — TRANSCRIBE ORDER (OUTPATIENT)
Dept: REGISTRATION | Age: 61
End: 2021-06-10

## 2021-06-10 DIAGNOSIS — I70.213 ATHEROSCLEROSIS OF NATIVE ARTERY OF BOTH LOWER EXTREMITIES WITH INTERMITTENT CLAUDICATION (HCC): ICD-10-CM

## 2021-06-10 DIAGNOSIS — I70.213 ATHEROSCLEROSIS OF NATIVE ARTERY OF BOTH LOWER EXTREMITIES WITH INTERMITTENT CLAUDICATION (HCC): Primary | ICD-10-CM

## 2021-06-10 LAB
ABO + RH BLD: NORMAL
ANION GAP SERPL CALC-SCNC: 4 MMOL/L (ref 3–18)
BASOPHILS # BLD: 0 K/UL (ref 0–0.1)
BASOPHILS NFR BLD: 0 % (ref 0–2)
BLOOD GROUP ANTIBODIES SERPL: NORMAL
BUN SERPL-MCNC: 15 MG/DL (ref 7–18)
BUN/CREAT SERPL: 18 (ref 12–20)
CALCIUM SERPL-MCNC: 9.3 MG/DL (ref 8.5–10.1)
CHLORIDE SERPL-SCNC: 105 MMOL/L (ref 100–111)
CO2 SERPL-SCNC: 28 MMOL/L (ref 21–32)
CREAT SERPL-MCNC: 0.85 MG/DL (ref 0.6–1.3)
DIFFERENTIAL METHOD BLD: ABNORMAL
EOSINOPHIL # BLD: 0.2 K/UL (ref 0–0.4)
EOSINOPHIL NFR BLD: 2 % (ref 0–5)
ERYTHROCYTE [DISTWIDTH] IN BLOOD BY AUTOMATED COUNT: 13.2 % (ref 11.6–14.5)
GLUCOSE SERPL-MCNC: 85 MG/DL (ref 74–99)
HCT VFR BLD AUTO: 39.5 % (ref 36–48)
HGB BLD-MCNC: 12.7 G/DL (ref 13–16)
INR PPP: 1 (ref 0.8–1.2)
LYMPHOCYTES # BLD: 3.2 K/UL (ref 0.9–3.6)
LYMPHOCYTES NFR BLD: 34 % (ref 21–52)
MCH RBC QN AUTO: 27.5 PG (ref 24–34)
MCHC RBC AUTO-ENTMCNC: 32.2 G/DL (ref 31–37)
MCV RBC AUTO: 85.5 FL (ref 74–97)
MONOCYTES # BLD: 0.7 K/UL (ref 0.05–1.2)
MONOCYTES NFR BLD: 7 % (ref 3–10)
NEUTS SEG # BLD: 5.4 K/UL (ref 1.8–8)
NEUTS SEG NFR BLD: 57 % (ref 40–73)
PLATELET # BLD AUTO: 180 K/UL (ref 135–420)
PLATELET COMMENTS,PCOM: ABNORMAL
PMV BLD AUTO: 10.1 FL (ref 9.2–11.8)
POTASSIUM SERPL-SCNC: 4.1 MMOL/L (ref 3.5–5.5)
PROTHROMBIN TIME: 12.8 SEC (ref 11.5–15.2)
RBC # BLD AUTO: 4.62 M/UL (ref 4.35–5.65)
RBC MORPH BLD: ABNORMAL
SODIUM SERPL-SCNC: 137 MMOL/L (ref 136–145)
SPECIMEN EXP DATE BLD: NORMAL
WBC # BLD AUTO: 9.5 K/UL (ref 4.6–13.2)

## 2021-06-10 PROCEDURE — 86901 BLOOD TYPING SEROLOGIC RH(D): CPT

## 2021-06-10 PROCEDURE — 36415 COLL VENOUS BLD VENIPUNCTURE: CPT

## 2021-06-10 PROCEDURE — 85025 COMPLETE CBC W/AUTO DIFF WBC: CPT

## 2021-06-10 PROCEDURE — 85610 PROTHROMBIN TIME: CPT

## 2021-06-10 PROCEDURE — 80048 BASIC METABOLIC PNL TOTAL CA: CPT

## 2021-06-10 PROCEDURE — U0003 INFECTIOUS AGENT DETECTION BY NUCLEIC ACID (DNA OR RNA); SEVERE ACUTE RESPIRATORY SYNDROME CORONAVIRUS 2 (SARS-COV-2) (CORONAVIRUS DISEASE [COVID-19]), AMPLIFIED PROBE TECHNIQUE, MAKING USE OF HIGH THROUGHPUT TECHNOLOGIES AS DESCRIBED BY CMS-2020-01-R: HCPCS

## 2021-06-10 RX ORDER — CYCLOBENZAPRINE HCL 10 MG
10 TABLET ORAL
COMMUNITY

## 2021-06-10 RX ORDER — ATORVASTATIN CALCIUM 10 MG/1
10 TABLET, FILM COATED ORAL DAILY
COMMUNITY

## 2021-06-10 RX ORDER — ASPIRIN 81 MG/1
81 TABLET ORAL DAILY
COMMUNITY

## 2021-06-10 NOTE — PERIOP NOTES
PRE-SURGICAL INSTRUCTIONS        Patient's Name:  Leona Mckinnon. Today's Date:  6/10/2021              Surgery Date:  6/14/2021                1. Do NOT eat or drink anything, including candy, gum, or ice chips after midnight on 6/13/2021, unless you have specific instructions from your surgeon or anesthesia provider to do so.  2. You may brush your teeth before coming to the hospital.  3. No smoking 24 hours prior to the day of surgery. 4. No alcohol 24 hours prior to the day of surgery. 5. No recreational drugs for one week prior to the day of surgery. 6. Leave all valuables, including money/purse, at home. 7. Remove all jewelry,no lotions powders, deodorant, or perfume/cologne/after shave on the skin. 8. Glasses/contact lenses and dentures may be worn to the hospital.  They will be removed prior to surgery. 9. Call your doctor if symptoms of a cold or illness develop within 24-48 hours prior to your surgery. 10.  AN ADULT MUST DRIVE YOU HOME AFTER OUTPATIENT SURGERY. 11.  If you are having an outpatient procedure, please make arrangements for a responsible adult to be with you for 24 hours after your surgery. 12.  NO VISITORS in the hospital at this time for outpatient procedures. Exceptions may be made for surgical admissions, per nursing unit guidelines      Special Instructions:      Bring list of CURRENT medications. Bring any pertinent legal medical records. Take these medications the morning of surgery with a sip of water: BP medications as directed by physician  Follow physician instructions about stopping aspirin. .  On the day of surgery, come in the main entrance of [facility]. Let the  at the desk know you are there for surgery. A staff member will come escort you to the surgical area on the second floor.     If you have any questions or concerns, please do not hesitate to call:     (Prior to the day of surgery) [facility] department:  [phone]   (Day of surgery) Pre-Op department:  [phone]    These surgical instructions were reviewed with patient during the PAT phone call.

## 2021-06-11 LAB — SARS-COV-2, COV2NT: NOT DETECTED

## 2021-06-13 ENCOUNTER — ANESTHESIA EVENT (OUTPATIENT)
Dept: CARDIOTHORACIC SURGERY | Age: 61
DRG: 254 | End: 2021-06-13
Payer: MEDICARE

## 2021-06-14 ENCOUNTER — HOSPITAL ENCOUNTER (INPATIENT)
Age: 61
LOS: 2 days | Discharge: HOME | DRG: 254 | End: 2021-06-16
Attending: SURGERY | Admitting: SURGERY
Payer: MEDICARE

## 2021-06-14 ENCOUNTER — ANESTHESIA (OUTPATIENT)
Dept: CARDIOTHORACIC SURGERY | Age: 61
DRG: 254 | End: 2021-06-14
Payer: MEDICARE

## 2021-06-14 DIAGNOSIS — I70.209 FEMORAL ARTERY OCCLUSION (HCC): Primary | ICD-10-CM

## 2021-06-14 LAB
AMPHET UR QL SCN: NEGATIVE
BARBITURATES UR QL SCN: NEGATIVE
BENZODIAZ UR QL: NEGATIVE
CANNABINOIDS UR QL SCN: NEGATIVE
COCAINE UR QL SCN: NEGATIVE
GLUCOSE BLD STRIP.AUTO-MCNC: 102 MG/DL (ref 70–110)
GLUCOSE BLD STRIP.AUTO-MCNC: 149 MG/DL (ref 70–110)
HBA1C MFR BLD: 6 % (ref 4.2–5.6)
HDSCOM,HDSCOM: NORMAL
METHADONE UR QL: NEGATIVE
OPIATES UR QL: NEGATIVE
PCP UR QL: NEGATIVE

## 2021-06-14 PROCEDURE — 77030002924 HC SUT GORTX WLGO -B: Performed by: SURGERY

## 2021-06-14 PROCEDURE — 74011250637 HC RX REV CODE- 250/637: Performed by: NURSE ANESTHETIST, CERTIFIED REGISTERED

## 2021-06-14 PROCEDURE — 77030002933 HC SUT MCRYL J&J -A: Performed by: SURGERY

## 2021-06-14 PROCEDURE — 01270 ANES PX ARTERIES UPR LEG NOS: CPT | Performed by: ANESTHESIOLOGY

## 2021-06-14 PROCEDURE — 2709999900 HC NON-CHARGEABLE SUPPLY: Performed by: SURGERY

## 2021-06-14 PROCEDURE — 76010000109 HC CV SURG 2.5 TO 3 HR: Performed by: SURGERY

## 2021-06-14 PROCEDURE — 77030025869: Performed by: SURGERY

## 2021-06-14 PROCEDURE — 04CK0ZZ EXTIRPATION OF MATTER FROM RIGHT FEMORAL ARTERY, OPEN APPROACH: ICD-10-PCS | Performed by: SURGERY

## 2021-06-14 PROCEDURE — 77030038692 HC WND DEB SYS IRMX -B: Performed by: SURGERY

## 2021-06-14 PROCEDURE — 74011250636 HC RX REV CODE- 250/636: Performed by: SURGERY

## 2021-06-14 PROCEDURE — 74011000250 HC RX REV CODE- 250: Performed by: ANESTHESIOLOGY

## 2021-06-14 PROCEDURE — 77030013079 HC BLNKT BAIR HGGR 3M -A: Performed by: ANESTHESIOLOGY

## 2021-06-14 PROCEDURE — 74011250636 HC RX REV CODE- 250/636: Performed by: NURSE ANESTHETIST, CERTIFIED REGISTERED

## 2021-06-14 PROCEDURE — 77030040361 HC SLV COMPR DVT MDII -B: Performed by: SURGERY

## 2021-06-14 PROCEDURE — 88311 DECALCIFY TISSUE: CPT

## 2021-06-14 PROCEDURE — 74011000250 HC RX REV CODE- 250: Performed by: SURGERY

## 2021-06-14 PROCEDURE — 04UK0KZ SUPPLEMENT RIGHT FEMORAL ARTERY WITH NONAUTOLOGOUS TISSUE SUBSTITUTE, OPEN APPROACH: ICD-10-PCS | Performed by: SURGERY

## 2021-06-14 PROCEDURE — 80307 DRUG TEST PRSMV CHEM ANLYZR: CPT

## 2021-06-14 PROCEDURE — C1768 GRAFT, VASCULAR: HCPCS | Performed by: SURGERY

## 2021-06-14 PROCEDURE — 77030010512 HC APPL CLP LIG J&J -C: Performed by: SURGERY

## 2021-06-14 PROCEDURE — 77030002986 HC SUT PROL J&J -A: Performed by: SURGERY

## 2021-06-14 PROCEDURE — 76060000036 HC ANESTHESIA 2.5 TO 3 HR: Performed by: SURGERY

## 2021-06-14 PROCEDURE — 77030040922 HC BLNKT HYPOTHRM STRY -A: Performed by: SURGERY

## 2021-06-14 PROCEDURE — 88304 TISSUE EXAM BY PATHOLOGIST: CPT

## 2021-06-14 PROCEDURE — 77030019605: Performed by: SURGERY

## 2021-06-14 PROCEDURE — 65620000000 HC RM CCU GENERAL

## 2021-06-14 PROCEDURE — 74011250637 HC RX REV CODE- 250/637: Performed by: SURGERY

## 2021-06-14 PROCEDURE — 77030008683 HC TU ET CUF COVD -A: Performed by: ANESTHESIOLOGY

## 2021-06-14 PROCEDURE — 77030033649 HC DRSG INCIS MGMT KT KCON -F: Performed by: SURGERY

## 2021-06-14 PROCEDURE — 77030010507 HC ADH SKN DERMBND J&J -B: Performed by: SURGERY

## 2021-06-14 PROCEDURE — 83036 HEMOGLOBIN GLYCOSYLATED A1C: CPT

## 2021-06-14 PROCEDURE — 77030002996 HC SUT SLK J&J -A: Performed by: SURGERY

## 2021-06-14 PROCEDURE — 82962 GLUCOSE BLOOD TEST: CPT

## 2021-06-14 PROCEDURE — 74011250636 HC RX REV CODE- 250/636: Performed by: ANESTHESIOLOGY

## 2021-06-14 PROCEDURE — 77030011265 HC ELECTRD BLD HEX COVD -A: Performed by: SURGERY

## 2021-06-14 PROCEDURE — 77030040830 HC CATH URETH FOL MDII -A: Performed by: SURGERY

## 2021-06-14 PROCEDURE — 77030002987 HC SUT PROL J&J -B: Performed by: SURGERY

## 2021-06-14 DEVICE — XENOSURE BIOLOGIC PATCH, 0.8CM X 8CM, EIFU
Type: IMPLANTABLE DEVICE | Site: GROIN | Status: FUNCTIONAL
Brand: XENOSURE BIOLOGIC PATCH

## 2021-06-14 RX ORDER — HEPARIN SODIUM 1000 [USP'U]/ML
INJECTION, SOLUTION INTRAVENOUS; SUBCUTANEOUS AS NEEDED
Status: DISCONTINUED | OUTPATIENT
Start: 2021-06-14 | End: 2021-06-14 | Stop reason: HOSPADM

## 2021-06-14 RX ORDER — HYDROMORPHONE HYDROCHLORIDE 1 MG/ML
.5-1 INJECTION, SOLUTION INTRAMUSCULAR; INTRAVENOUS; SUBCUTANEOUS
Status: DISCONTINUED | OUTPATIENT
Start: 2021-06-14 | End: 2021-06-16 | Stop reason: HOSPADM

## 2021-06-14 RX ORDER — SODIUM CHLORIDE 0.9 % (FLUSH) 0.9 %
5-40 SYRINGE (ML) INJECTION AS NEEDED
Status: DISCONTINUED | OUTPATIENT
Start: 2021-06-14 | End: 2021-06-14 | Stop reason: HOSPADM

## 2021-06-14 RX ORDER — CHLORHEXIDINE GLUCONATE 4 G/100ML
SOLUTION TOPICAL
Status: DISCONTINUED
Start: 2021-06-14 | End: 2021-06-14

## 2021-06-14 RX ORDER — SODIUM CHLORIDE 0.9 % (FLUSH) 0.9 %
5-40 SYRINGE (ML) INJECTION EVERY 8 HOURS
Status: DISCONTINUED | OUTPATIENT
Start: 2021-06-14 | End: 2021-06-14 | Stop reason: HOSPADM

## 2021-06-14 RX ORDER — ONDANSETRON 2 MG/ML
4 INJECTION INTRAMUSCULAR; INTRAVENOUS
Status: DISCONTINUED | OUTPATIENT
Start: 2021-06-14 | End: 2021-06-16 | Stop reason: HOSPADM

## 2021-06-14 RX ORDER — ASPIRIN 81 MG/1
81 TABLET ORAL DAILY
Status: DISCONTINUED | OUTPATIENT
Start: 2021-06-14 | End: 2021-06-16 | Stop reason: HOSPADM

## 2021-06-14 RX ORDER — ATORVASTATIN CALCIUM 10 MG/1
10 TABLET, FILM COATED ORAL
Status: DISCONTINUED | OUTPATIENT
Start: 2021-06-14 | End: 2021-06-16 | Stop reason: HOSPADM

## 2021-06-14 RX ORDER — CYCLOBENZAPRINE HCL 10 MG
10 TABLET ORAL
Status: DISCONTINUED | OUTPATIENT
Start: 2021-06-14 | End: 2021-06-16 | Stop reason: HOSPADM

## 2021-06-14 RX ORDER — INSULIN LISPRO 100 [IU]/ML
INJECTION, SOLUTION INTRAVENOUS; SUBCUTANEOUS ONCE
Status: DISCONTINUED | OUTPATIENT
Start: 2021-06-14 | End: 2021-06-14 | Stop reason: HOSPADM

## 2021-06-14 RX ORDER — EPHEDRINE SULFATE/0.9% NACL/PF 50 MG/5 ML
SYRINGE (ML) INTRAVENOUS AS NEEDED
Status: DISCONTINUED | OUTPATIENT
Start: 2021-06-14 | End: 2021-06-14 | Stop reason: HOSPADM

## 2021-06-14 RX ORDER — AMLODIPINE BESYLATE 5 MG/1
5 TABLET ORAL DAILY
Status: DISCONTINUED | OUTPATIENT
Start: 2021-06-14 | End: 2021-06-16 | Stop reason: HOSPADM

## 2021-06-14 RX ORDER — LIDOCAINE HYDROCHLORIDE 10 MG/ML
INJECTION, SOLUTION EPIDURAL; INFILTRATION; INTRACAUDAL; PERINEURAL
Status: DISCONTINUED
Start: 2021-06-14 | End: 2021-06-14

## 2021-06-14 RX ORDER — DEXAMETHASONE SODIUM PHOSPHATE 4 MG/ML
INJECTION, SOLUTION INTRA-ARTICULAR; INTRALESIONAL; INTRAMUSCULAR; INTRAVENOUS; SOFT TISSUE AS NEEDED
Status: DISCONTINUED | OUTPATIENT
Start: 2021-06-14 | End: 2021-06-14 | Stop reason: HOSPADM

## 2021-06-14 RX ORDER — HEPARIN SODIUM 200 [USP'U]/100ML
INJECTION, SOLUTION INTRAVENOUS
Status: DISCONTINUED
Start: 2021-06-14 | End: 2021-06-14

## 2021-06-14 RX ORDER — DEXTROSE MONOHYDRATE AND SODIUM CHLORIDE 5; .45 G/100ML; G/100ML
75 INJECTION, SOLUTION INTRAVENOUS CONTINUOUS
Status: DISPENSED | OUTPATIENT
Start: 2021-06-14 | End: 2021-06-15

## 2021-06-14 RX ORDER — CLOPIDOGREL BISULFATE 75 MG/1
75 TABLET ORAL DAILY
Status: DISCONTINUED | OUTPATIENT
Start: 2021-06-14 | End: 2021-06-16 | Stop reason: HOSPADM

## 2021-06-14 RX ORDER — HEPARIN SODIUM 200 [USP'U]/100ML
INJECTION, SOLUTION INTRAVENOUS
Status: COMPLETED | OUTPATIENT
Start: 2021-06-14 | End: 2021-06-14

## 2021-06-14 RX ORDER — HYDROCODONE BITARTRATE AND ACETAMINOPHEN 5; 325 MG/1; MG/1
1 TABLET ORAL
Status: DISCONTINUED | OUTPATIENT
Start: 2021-06-14 | End: 2021-06-16 | Stop reason: HOSPADM

## 2021-06-14 RX ORDER — MIDAZOLAM HYDROCHLORIDE 1 MG/ML
INJECTION, SOLUTION INTRAMUSCULAR; INTRAVENOUS AS NEEDED
Status: DISCONTINUED | OUTPATIENT
Start: 2021-06-14 | End: 2021-06-14 | Stop reason: HOSPADM

## 2021-06-14 RX ORDER — PROPOFOL 10 MG/ML
INJECTION, EMULSION INTRAVENOUS AS NEEDED
Status: DISCONTINUED | OUTPATIENT
Start: 2021-06-14 | End: 2021-06-14 | Stop reason: HOSPADM

## 2021-06-14 RX ORDER — SODIUM CHLORIDE, SODIUM LACTATE, POTASSIUM CHLORIDE, CALCIUM CHLORIDE 600; 310; 30; 20 MG/100ML; MG/100ML; MG/100ML; MG/100ML
75 INJECTION, SOLUTION INTRAVENOUS CONTINUOUS
Status: DISCONTINUED | OUTPATIENT
Start: 2021-06-14 | End: 2021-06-14 | Stop reason: HOSPADM

## 2021-06-14 RX ORDER — LISINOPRIL 20 MG/1
20 TABLET ORAL DAILY
Status: DISCONTINUED | OUTPATIENT
Start: 2021-06-14 | End: 2021-06-16 | Stop reason: HOSPADM

## 2021-06-14 RX ORDER — FAMOTIDINE 20 MG/1
20 TABLET, FILM COATED ORAL ONCE
Status: COMPLETED | OUTPATIENT
Start: 2021-06-14 | End: 2021-06-14

## 2021-06-14 RX ORDER — SODIUM CHLORIDE 0.9 % (FLUSH) 0.9 %
5-40 SYRINGE (ML) INJECTION AS NEEDED
Status: DISCONTINUED | OUTPATIENT
Start: 2021-06-14 | End: 2021-06-16 | Stop reason: HOSPADM

## 2021-06-14 RX ORDER — SODIUM CHLORIDE 0.9 % (FLUSH) 0.9 %
5-40 SYRINGE (ML) INJECTION EVERY 8 HOURS
Status: DISCONTINUED | OUTPATIENT
Start: 2021-06-14 | End: 2021-06-16 | Stop reason: HOSPADM

## 2021-06-14 RX ORDER — HYDROCHLOROTHIAZIDE 25 MG/1
25 TABLET ORAL DAILY
Status: DISCONTINUED | OUTPATIENT
Start: 2021-06-14 | End: 2021-06-16 | Stop reason: HOSPADM

## 2021-06-14 RX ORDER — LIDOCAINE HYDROCHLORIDE 10 MG/ML
INJECTION, SOLUTION EPIDURAL; INFILTRATION; INTRACAUDAL; PERINEURAL AS NEEDED
Status: DISCONTINUED | OUTPATIENT
Start: 2021-06-14 | End: 2021-06-14 | Stop reason: HOSPADM

## 2021-06-14 RX ORDER — ROCURONIUM BROMIDE 10 MG/ML
INJECTION, SOLUTION INTRAVENOUS AS NEEDED
Status: DISCONTINUED | OUTPATIENT
Start: 2021-06-14 | End: 2021-06-14 | Stop reason: HOSPADM

## 2021-06-14 RX ORDER — SUCCINYLCHOLINE CHLORIDE 20 MG/ML
INJECTION INTRAMUSCULAR; INTRAVENOUS AS NEEDED
Status: DISCONTINUED | OUTPATIENT
Start: 2021-06-14 | End: 2021-06-14 | Stop reason: HOSPADM

## 2021-06-14 RX ORDER — FENTANYL CITRATE 50 UG/ML
INJECTION, SOLUTION INTRAMUSCULAR; INTRAVENOUS AS NEEDED
Status: DISCONTINUED | OUTPATIENT
Start: 2021-06-14 | End: 2021-06-14 | Stop reason: HOSPADM

## 2021-06-14 RX ADMIN — ATORVASTATIN CALCIUM 10 MG: 10 TABLET, FILM COATED ORAL at 21:15

## 2021-06-14 RX ADMIN — Medication 81 MG: at 12:09

## 2021-06-14 RX ADMIN — SODIUM CHLORIDE, SODIUM LACTATE, POTASSIUM CHLORIDE, AND CALCIUM CHLORIDE 75 ML/HR: 600; 310; 30; 20 INJECTION, SOLUTION INTRAVENOUS at 06:40

## 2021-06-14 RX ADMIN — Medication 10 ML: at 15:11

## 2021-06-14 RX ADMIN — SUCCINYLCHOLINE CHLORIDE 100 MG: 20 INJECTION, SOLUTION INTRAMUSCULAR; INTRAVENOUS at 08:00

## 2021-06-14 RX ADMIN — WATER 2 G: 1 INJECTION INTRAMUSCULAR; INTRAVENOUS; SUBCUTANEOUS at 08:20

## 2021-06-14 RX ADMIN — Medication 10 ML: at 06:40

## 2021-06-14 RX ADMIN — FENTANYL CITRATE 25 MCG: 50 INJECTION, SOLUTION INTRAMUSCULAR; INTRAVENOUS at 08:00

## 2021-06-14 RX ADMIN — HYDROMORPHONE HYDROCHLORIDE 1 MG: 1 INJECTION, SOLUTION INTRAMUSCULAR; INTRAVENOUS; SUBCUTANEOUS at 19:44

## 2021-06-14 RX ADMIN — Medication 5 MG: at 10:07

## 2021-06-14 RX ADMIN — HEPARIN SODIUM 5000 UNITS: 1000 INJECTION, SOLUTION INTRAVENOUS; SUBCUTANEOUS at 08:47

## 2021-06-14 RX ADMIN — FENTANYL CITRATE 50 MCG: 50 INJECTION, SOLUTION INTRAMUSCULAR; INTRAVENOUS at 09:50

## 2021-06-14 RX ADMIN — FAMOTIDINE 20 MG: 20 TABLET ORAL at 06:52

## 2021-06-14 RX ADMIN — PROPOFOL 150 MG: 10 INJECTION, EMULSION INTRAVENOUS at 08:00

## 2021-06-14 RX ADMIN — FENTANYL CITRATE 25 MCG: 50 INJECTION, SOLUTION INTRAMUSCULAR; INTRAVENOUS at 09:49

## 2021-06-14 RX ADMIN — ROCURONIUM BROMIDE 10 MG: 50 INJECTION INTRAVENOUS at 08:54

## 2021-06-14 RX ADMIN — MIDAZOLAM HYDROCHLORIDE 1 MG: 2 INJECTION, SOLUTION INTRAMUSCULAR; INTRAVENOUS at 08:00

## 2021-06-14 RX ADMIN — DEXTROSE MONOHYDRATE AND SODIUM CHLORIDE 75 ML/HR: 5; .45 INJECTION, SOLUTION INTRAVENOUS at 11:01

## 2021-06-14 RX ADMIN — FENTANYL CITRATE 50 MCG: 50 INJECTION, SOLUTION INTRAMUSCULAR; INTRAVENOUS at 08:27

## 2021-06-14 RX ADMIN — CLOPIDOGREL BISULFATE 75 MG: 75 TABLET ORAL at 12:10

## 2021-06-14 RX ADMIN — FENTANYL CITRATE 25 MCG: 50 INJECTION, SOLUTION INTRAMUSCULAR; INTRAVENOUS at 08:58

## 2021-06-14 RX ADMIN — DEXAMETHASONE SODIUM PHOSPHATE 4 MG: 4 INJECTION, SOLUTION INTRAMUSCULAR; INTRAVENOUS at 08:27

## 2021-06-14 RX ADMIN — ROCURONIUM BROMIDE 30 MG: 50 INJECTION INTRAVENOUS at 08:05

## 2021-06-14 RX ADMIN — HYDROMORPHONE HYDROCHLORIDE 1 MG: 1 INJECTION, SOLUTION INTRAMUSCULAR; INTRAVENOUS; SUBCUTANEOUS at 12:20

## 2021-06-14 RX ADMIN — ROCURONIUM BROMIDE 10 MG: 50 INJECTION INTRAVENOUS at 08:28

## 2021-06-14 RX ADMIN — MIDAZOLAM HYDROCHLORIDE 1 MG: 2 INJECTION, SOLUTION INTRAMUSCULAR; INTRAVENOUS at 07:55

## 2021-06-14 RX ADMIN — FENTANYL CITRATE 25 MCG: 50 INJECTION, SOLUTION INTRAMUSCULAR; INTRAVENOUS at 10:32

## 2021-06-14 RX ADMIN — Medication 5 MG: at 09:22

## 2021-06-14 NOTE — PROGRESS NOTES
TRANSFER - OUT REPORT:    Verbal report given to Andres JAMA RN/ Tobi HORTON RN(name) on [employer]  being transferred to [facility] ICU(unit) for routine post - op       Report consisted of patients Situation, Background, Assessment and   Recommendations(SBAR). Information from the following report(s) SBAR, OR Summary, Procedure Summary, Recent Results, Cardiac Rhythm   and Quality Measures was reviewed with the receiving nurse. Lines:   Peripheral IV 06/14/21 Anterior; Left Hand (Active)   Site Assessment Clean, dry, & intact 06/14/21 0657   Phlebitis Assessment 0 06/14/21 0657   Dressing Status Clean, dry, & intact 06/14/21 0657   Dressing Type Transparent;Tape 06/14/21 0657   Hub Color/Line Status Flushed; Infusing;Pink 06/14/21 0657   Action Taken Dressing reinforced 06/14/21 0657   Alcohol Cap Used No 06/14/21 0657        Opportunity for questions and clarification was provided.       Patient transported with:  Registered Nurse  Anesthesia Staff  Monitor  Oxygen

## 2021-06-14 NOTE — OP NOTES
Preoperative diagnosis: Right femoral artery occlusion    Postoperative diagnosis: Same    Procedures performed:  #1  Right femoral artery endarterectomy with patch angioplasty      Cultures: None    Specimens: None    Drains: None    Estimated blood loss: Less than 50 mL    Assistants: None    Implants: Please see above    Complications: None    Anesthesia: General anesthesia. Indications for the procedure:  Sonnie Hashimoto. is a 56 y.o. right leg pain with right femoral artery occlusion. Patient was given the appropriate risk and benefits of the procedure including but not limited to bleeding, infection, damage to adjacent structures, MI, stroke, death, loss of lower extremity, need for further surgery. Patient was understanding of all the risks and underwent a procedure. Operative findings:   #1  Dense fixed right common femoral occlusion. Endarterectomy performed and successful    Procedure:  Patient was correctly identified in the pre op area and taken to the operating room in stable condition. Patient had pre-incision timeout prior to any incision. Patient was prepped and draped in the normal sterile fashion according to CDC guidelines aseptic technique. Had a Oden catheter appropriate. Appropriate antibiotics were given. Incision was made in the right groin over the palpable occluded femoral artery. This carried down through skin and fascia. Expose the common femoral from the external iliac artery to the profunda into the SFA placed Vesseloops at all these locations. There were several side branches including the circumflex with a controlled with Vesseloops as well. Anticoagulated with 5000 of heparin. First clamped the distal external iliac arteries and the profunda and the SFA and then the side branches. Made an incision on the artery and directly incised from the external iliac artery all the way onto the SFA. Endarterectomized this dense fixed plaque completely.   Transected it at the external iliac artery and at the SFA. I tacked down the edges of the SFA with 7-0 Prolene sutures. After moving all of the debris I removed and repaired the site with a bovine pericardial patch. 6-0 Prolene suture circular standard fashion just prior to completion of burped all the sites to remove any debris. Once the patch was complete I released all the controls was no bleeding and good hemostasis. Hand-held Doppler confirmed flow in all segments. Irrigated and closed with 2-0 Monocryl for the femoral fascia 3-0 Monocryl for subcu 4 Monocryl and Dermabond glue for the skin a wound management system was applied. He tolerated this well transferred to recovery in stable condition.

## 2021-06-14 NOTE — PROGRESS NOTES
TRANSFER - IN REPORT:    [facility] on [employer].  being received from [facility](unit) for ordered procedure    Report consisted of patients Situation, Background, Assessment and   Recommendations(SBAR). Information from the following report(s) Kardex, OR Summary, Procedure Summary, Intake/Output, MAR, Recent Results, Med Rec Status, Cardiac Rhythm ., Alarm Parameters , Procedure Verification and Quality Measures was reviewed with the receiving nurse. Opportunity for questions and clarification was provided. Assessment completed upon patients arrival to unit and care assumed.

## 2021-06-14 NOTE — PROGRESS NOTES
1130 patient stable com plain of pain on rt leg  
1220 medicated for pain 
 
1900: {BSI BEDSIDE_VERBAL_RECORDED_WRITTEN:[id number]::\"Bedside\"} shift change report given to *** (oncoming nurse) by *** (offgoing nurse). Report included the following information {SBAR REPORTS LKRB:[id number]}.

## 2021-06-14 NOTE — ANESTHESIA POSTPROCEDURE EVALUATION
Procedure(s):  RIGHT FEMORAL ENDARTERECTOMY PATCH ANGIOPLASTY. general    Anesthesia Post Evaluation      Multimodal analgesia: multimodal analgesia used between 6 hours prior to anesthesia start to PACU discharge  Patient location during evaluation: ICU  Patient participation: complete - patient participated  Level of consciousness: awake and alert  Pain management: adequate  Airway patency: patent  Anesthetic complications: no  Cardiovascular status: acceptable and hemodynamically stable  Respiratory status: acceptable  Hydration status: acceptable  Post anesthesia nausea and vomiting:  controlled      INITIAL Post-op Vital signs:   Vitals Value Taken Time   /79 06/14/21 1230   Temp 37.1 °C (98.8 °F) 06/14/21 1034   Pulse 73 06/14/21 1233   Resp 8 06/14/21 1233   SpO2 98 % 06/14/21 1233   Vitals shown include unvalidated device data.

## 2021-06-14 NOTE — ANESTHESIA PREPROCEDURE EVALUATION
Relevant Problems   No relevant active problems       Anesthetic History   No history of anesthetic complications            Review of Systems / Medical History  Patient summary reviewed, nursing notes reviewed and pertinent labs reviewed    Pulmonary  Within defined limits                 Neuro/Psych   Within defined limits           Cardiovascular    Hypertension          PAD and hyperlipidemia      Comments: 08/2020 stress neg   GI/Hepatic/Renal       Hepatitis: type C         Endo/Other    Diabetes: using insulin    Arthritis     Other Findings            Physical Exam    Airway  Mallampati: III  TM Distance: 4 - 6 cm  Neck ROM: normal range of motion   Mouth opening: Normal     Cardiovascular    Rhythm: regular           Dental    Dentition: Edentulous     Pulmonary  Breath sounds clear to auscultation               Abdominal  GI exam deferred       Other Findings            Anesthetic Plan    ASA: 3  Anesthesia type: general            Anesthetic plan and risks discussed with: Patient

## 2021-06-14 NOTE — H&P
Surgery History and Physical    Subjective:      Leona Eli is a 56 y.o.male who presents with right leg pain, right femoral artery occlusive disease.     Patient Active Problem List    Diagnosis Date Noted    Femoral artery occlusion ([address] 06/14/2021    Diabetes mellitus ([address] 11/12/2019    Chronic alcohol dependence, continuous ([address] 11/12/2019    Diabetes mellitus, new onset ([address] 11/11/2019    Rectal bleeding 11/08/2018    Encounter for long-term use of opiate analgesic 06/11/2018    Lumbar spinal stenosis 08/31/2017    S/P cervical spinal fusion 5/2016 06/06/2016    S/P cervical discectomy 05/23/2016    Cervical myelopathy (HCC) 12/08/2015    Prediabetes 03/23/2014    Essential hypertension, benign 03/19/2014    Sciatica 03/19/2014    Back pain 03/19/2014    Hepatitis C 03/19/2014    Carpal tunnel syndrome 03/19/2014     Past Medical History:   Diagnosis Date    Chronic pain 2021    RT/LT leg    Diabetes mellitus, new onset ([address] 11/11/2019    no meds now    Hypertension     Liver disease     Hepatitis C- was tx    Low back pain     Lumbar stenosis     L3-L4, L4-L5    Migraine headache     Numbness and tingling in left hand     Radiculopathy     Right L4-L5    Right leg pain     Sensation of cold in leg     Radiating into right posterior buttock and anterior thigh    Unsteady gait       Past Surgical History:   Procedure Laterality Date    COLONOSCOPY N/A 11/6/2018    COLONOSCOPY, SCREENING /c Bx Polypectomy /c Hot Snared Polypectomy performed by Jie Coley MD at [address] N/A 11/9/2018    SIGMOIDOSCOPY FLEXIBLE: ENDO CLIP APPLICATION performed by Jie Coley MD at [facility]  05/23/2016    ACDF C3/4 C4/5 C5/6 C6/7, Dr. Michael Sebastian  05/25/16    hematoma removal from cervical spine      Social History     Tobacco Use    Smoking status: Former Smoker     Packs/day: 0.50 Years: 40.00     Pack years: 20.00     Types: Cigarettes, Pipe     Quit date: 2016     Years since quittin.1    Smokeless tobacco: Never Used   Substance Use Topics    Alcohol use: Yes     Alcohol/week: 4.0 standard drinks     Types: 4 Glasses of wine per week     Comment: c      Family History   Problem Relation Age of Onset    Hypertension Mother     Heart Disease Mother     Diabetes Mother     Hypertension Father       Prior to Admission medications    Medication Sig Start Date End Date Taking? Authorizing Provider   atorvastatin (LIPITOR) 10 mg tablet Take 10 mg by mouth daily. Yes Provider, Historical   aspirin delayed-release 81 mg tablet Take 81 mg by mouth daily. Yes Provider, Historical   cyclobenzaprine (FLEXERIL) 10 mg tablet Take 10 mg by mouth three (3) times daily as needed for Muscle Spasm(s). Yes Provider, Historical   lisinopril-hydroCHLOROthiazide (PRINZIDE, ZESTORETIC) 20-25 mg per tablet Half of a tablet daily   Yes Provider, Historical   fluticasone propionate (FLONASE NA) by Nasal route. Yes Other, MD Omer   oxyCODONE IR (ROXICODONE) 5 mg immediate release tablet  10/15/19  Yes Provider, Historical   amLODIPine (NORVASC) 5 mg tablet Take 5 mg by mouth daily. Yes Provider, Historical   insulin lispro (HUMALOG) 100 unit/mL injection Sliding scale    180 and above 2 units  250 and above 4 units  299 and above 6 units  300 and above 8 units  350 and above 10 units and call Dr Melvin Sadler PCP  Indications: type 2 diabetes mellitus  Patient not taking: Reported on 6/10/2021 11/16/19   Kristie Weber MD   lisinopril (PRINIVIL, ZESTRIL) 20 mg tablet Take 1 Tab by mouth daily. Patient not taking: Reported on 6/10/2021 11/15/19   Kristie Weber MD   insulin glargine (LANTUS) 100 unit/mL injection 22 units hs  Patient not taking: Reported on 6/10/2021 11/15/19   Kristie Weber MD   metFORMIN (GLUCOPHAGE) 500 mg tablet Take 1 Tab by mouth two (2) times daily (with meals). Indications: type 2 diabetes mellitus  Patient not taking: Reported on 6/10/2021 11/15/19   Tony Felix MD     Allergies   Allergen Reactions    Cymbalta [Duloxetine] Itching    Percocet [Oxycodone-Acetaminophen] Itching    Tomato Itching         Review of Systems:    A comprehensive review of systems was negative except for that written in the History of Present Illness. Objective:     No data found. No data recorded. Physical Exam:  LUNG: clear to auscultation bilaterally, HEART: S1, S2 normal, ABDOMEN: soft, non-tender. Bowel sounds normal. No masses,  no organomegaly    Labs: No results found for this or any previous visit (from the past 24 hour(s)).     Data Review:    BMP:   Lab Results   Component Value Date/Time    Glucose 85 06/10/2021 12:42 PM    Sodium 137 06/10/2021 12:42 PM    Potassium 4.1 06/10/2021 12:42 PM    Chloride 105 06/10/2021 12:42 PM    CO2 28 06/10/2021 12:42 PM    BUN 15 06/10/2021 12:42 PM    Creatinine 0.85 06/10/2021 12:42 PM    Calcium 9.3 06/10/2021 12:42 PM       Assessment:     Active Problems:    Femoral artery occlusion ([address] (6/14/2021)        Plan:     Right femoral endarterectomy with patch    Signed By: Jatinder Morrison MD     June 14, 2021

## 2021-06-15 LAB
ANION GAP SERPL CALC-SCNC: 7 MMOL/L (ref 3–18)
BUN SERPL-MCNC: 13 MG/DL (ref 7–18)
BUN/CREAT SERPL: 14 (ref 12–20)
CALCIUM SERPL-MCNC: 9.2 MG/DL (ref 8.5–10.1)
CHLORIDE SERPL-SCNC: 105 MMOL/L (ref 100–111)
CO2 SERPL-SCNC: 24 MMOL/L (ref 21–32)
CREAT SERPL-MCNC: 0.9 MG/DL (ref 0.6–1.3)
ERYTHROCYTE [DISTWIDTH] IN BLOOD BY AUTOMATED COUNT: 13.4 % (ref 11.6–14.5)
GLUCOSE SERPL-MCNC: 134 MG/DL (ref 74–99)
HCT VFR BLD AUTO: 41.4 % (ref 36–48)
HGB BLD-MCNC: 13.4 G/DL (ref 13–16)
MCH RBC QN AUTO: 28.4 PG (ref 24–34)
MCHC RBC AUTO-ENTMCNC: 32.4 G/DL (ref 31–37)
MCV RBC AUTO: 87.7 FL (ref 74–97)
PLATELET # BLD AUTO: 170 K/UL (ref 135–420)
PMV BLD AUTO: 10.6 FL (ref 9.2–11.8)
POTASSIUM SERPL-SCNC: 4 MMOL/L (ref 3.5–5.5)
RBC # BLD AUTO: 4.72 M/UL (ref 4.35–5.65)
SODIUM SERPL-SCNC: 136 MMOL/L (ref 136–145)
WBC # BLD AUTO: 14.3 K/UL (ref 4.6–13.2)

## 2021-06-15 PROCEDURE — 2709999900 HC NON-CHARGEABLE SUPPLY

## 2021-06-15 PROCEDURE — 65620000000 HC RM CCU GENERAL

## 2021-06-15 PROCEDURE — 80048 BASIC METABOLIC PNL TOTAL CA: CPT

## 2021-06-15 PROCEDURE — 74011250636 HC RX REV CODE- 250/636: Performed by: SURGERY

## 2021-06-15 PROCEDURE — 85027 COMPLETE CBC AUTOMATED: CPT

## 2021-06-15 PROCEDURE — 36415 COLL VENOUS BLD VENIPUNCTURE: CPT

## 2021-06-15 PROCEDURE — 74011250637 HC RX REV CODE- 250/637: Performed by: SURGERY

## 2021-06-15 RX ADMIN — HYDROCODONE BITARTRATE AND ACETAMINOPHEN 1 TABLET: 5; 325 TABLET ORAL at 16:51

## 2021-06-15 RX ADMIN — AMLODIPINE BESYLATE 5 MG: 5 TABLET ORAL at 08:19

## 2021-06-15 RX ADMIN — ATORVASTATIN CALCIUM 10 MG: 10 TABLET, FILM COATED ORAL at 21:53

## 2021-06-15 RX ADMIN — HYDROMORPHONE HYDROCHLORIDE 1 MG: 1 INJECTION, SOLUTION INTRAMUSCULAR; INTRAVENOUS; SUBCUTANEOUS at 01:18

## 2021-06-15 RX ADMIN — Medication 81 MG: at 08:19

## 2021-06-15 RX ADMIN — CLOPIDOGREL BISULFATE 75 MG: 75 TABLET ORAL at 08:19

## 2021-06-15 RX ADMIN — HYDROMORPHONE HYDROCHLORIDE 1 MG: 1 INJECTION, SOLUTION INTRAMUSCULAR; INTRAVENOUS; SUBCUTANEOUS at 13:17

## 2021-06-15 RX ADMIN — HYDROCODONE BITARTRATE AND ACETAMINOPHEN 1 TABLET: 5; 325 TABLET ORAL at 21:53

## 2021-06-15 RX ADMIN — HYDROCHLOROTHIAZIDE 12.5 MG: 25 TABLET ORAL at 08:20

## 2021-06-15 RX ADMIN — HYDROMORPHONE HYDROCHLORIDE 1 MG: 1 INJECTION, SOLUTION INTRAMUSCULAR; INTRAVENOUS; SUBCUTANEOUS at 06:28

## 2021-06-15 RX ADMIN — Medication 10 ML: at 21:55

## 2021-06-15 RX ADMIN — Medication 10 ML: at 13:17

## 2021-06-15 NOTE — PROGRESS NOTES
Reason for Admission:  Femoral artery occlusion ([facility] [I70.209]                 RUR Score:    10%            Plan for utilizing home health:    Not at this time. Likelihood of Readmission:   LOW                         Transition of Care Plan:              Initial assessment completed with patient. Cognitive status of patient: oriented to time, place, person and situation. Face sheet information confirmed:  yes. The patient designates his wife Ryland Byrd to participate in his discharge plan and to receive any needed information. This patient lives in a single family home with his wife. Patient is not able to navigate steps as needed. Prior to hospitalization, patient was considered to be independent with ADLs/IADLS : yes . Patient has a current ACP document on file: no      Healthcare Decision Maker:     Click here to complete [address] including selection of the Healthcare Decision Maker Relationship (ie \"Primary\")    The spouse will be available to transport patient home upon discharge. The patient already has [employer] medical equipment available in the home. Patient is not currently active with home health. Patient has not stayed in a skilled nursing facility or rehab. This patient is on dialysis :no    Currently, the discharge plan is Home with his wife. The patient states that he can obtain his medications from the pharmacy, and take his medications as directed. Patient's current insurance is [employer]. Care Management Interventions  PCP Verified by CM: Yes  Mode of Transport at Discharge:  Other (see comment) (wife to transport home)  Transition of Care Consult (CM Consult): Discharge Planning  Physical Therapy Consult: No  Occupational Therapy Consult: No  Current Support Network: Lives with Spouse  Confirm Follow Up Transport: Family  Discharge Location  Discharge Placement: Home with family assistance        Ebenezer Read RN BSN  Care Manager  [phone]

## 2021-06-15 NOTE — PROGRESS NOTES
conducted an initial consultation and Spiritual Assessment for Karli Myers, who is a 56 y. o.,male. Patient's Primary Language is: [place]. According to the patient's EMR Hindu Affiliation is: [facility].     The reason the Patient came to the hospital is:   Patient Active Problem List    Diagnosis Date Noted    Femoral artery occlusion ([facility] 06/14/2021    Diabetes mellitus ([facility] 11/12/2019    Chronic alcohol dependence, continuous ([facility] 11/12/2019    Diabetes mellitus, new onset ([facility] 11/11/2019    Rectal bleeding 11/08/2018    Encounter for long-term use of opiate analgesic 06/11/2018    Lumbar spinal stenosis 08/31/2017    S/P cervical spinal fusion 5/2016 06/06/2016    S/P cervical discectomy 05/23/2016    Cervical myelopathy (HCC) 12/08/2015    Prediabetes 03/23/2014    Essential hypertension, benign 03/19/2014    Sciatica 03/19/2014    Back pain 03/19/2014    Hepatitis C 03/19/2014    Carpal tunnel syndrome 03/19/2014        The  provided the following Interventions:  Initiated a relationship of care and support. Explored issues of michelle, belief, spirituality and Christian/ritual needs while hospitalized. Listened empathically. Provided chaplaincy education. Provided information about Spiritual Care Services.  addressed Advance Medical Directives with the patient. Patient is not interested at this time.  left materials for patient to review. Patient will call  when ready to complete. Chart reviewed. The following outcomes where achieved:  Patient shared limited information about both their medical narrative and spiritual journey/beliefs.  confirmed Patient's Hindu Affiliation. Patient processed feeling about current hospitalization. Patient expressed gratitude for 's visit. Assessment:  Patient does not have any Christian/cultural needs that will affect patient's preferences in health care.   There are no spiritual or Presybeterian issues which require intervention at this time. Plan:  Chaplains will continue to follow and will provide pastoral care on an as needed/requested basis.     [address]   [phone]

## 2021-06-15 NOTE — PROGRESS NOTES
Sitting up in chair, tolerating diet  Vitals and stable labs are stable  Right groin incision intact with remainder of system in place  Dorsalis pedis pulse multiphasic  Encourage out of bed ambulation  Anticipate discharge tomorrow

## 2021-06-15 NOTE — PROGRESS NOTES
1915-Bedside and verbal report from CYRUS Cervantes and LIONEL Peters RN. Pt resting in bed, spouse at bedside, no apparent distress, c/o pain at right groin surgical site 8/10, pulses present via doppler. R groin site soft, prevena intact. No evidence of hematoma. NSR on the monitor. VSS. BP systolic in 593'D. Will given PRN Dilaudid for pain per pt's request.    1945-Pt medicated for pain, see MAR and pain assessment flow-sheet. VSS. Respirations 17.     2000-Pt states pain is now 3/10 which is tolerable for him, will cont to monitor. 2100-Pt resting, NAD, NSR on the monitor. No apparent pain    2300-Pt resting, NAD, no apparent pain, no SOB noted. VSS. Right groin dressing clean/dry/intact. No hematoma. 0000-Reassessment. NSR. NAD. VSS    0120-Pt medicated for pain, see pain assessment. 0135-Pain reassessed. 0400-Pt resting, Right groin dressing clean/dry, intact, no hematoma. Oden draining, patent. UOP adequate. BP stable. 0600-Oden pulled per unit protocol. Urinal left within reach, NSR on the monitor. Assisted to recliner chair. 0628-Pt medicated for pain, see pain assessment. 0642-Pain reassessed. 0700-Bedside and Verbal shift change report given to LIONEL Montalvo (oncoming nurse) by Carolin Schmitt RN (offgoing nurse). Report included the following information SBAR, Kardex, OR Summary, Intake/Output, Recent Results and Cardiac Rhythm NSR.

## 2021-06-15 NOTE — PROGRESS NOTES
Problem: Falls - Risk of  Goal: *Absence of Falls  Description: Document Rudolphfliphawa Huitron Fall Risk and appropriate interventions in the flowsheet.   Note: Fall Risk Interventions:  Mobility Interventions: Bed/chair exit alarm, Patient to call before getting OOB         Medication Interventions: Patient to call before getting OOB, Teach patient to arise slowly    Elimination Interventions: Call light in reach, Elevated toilet seat, Stay With Me (per policy), Toileting schedule/hourly rounds

## 2021-06-15 NOTE — ACP (ADVANCE CARE PLANNING)
Advance Care Planning     General Advance Care Planning (ACP) Conversation      Date of Conversation: 6/15/21  Conducted with: Patient with Decision Making Capacity    Healthcare Decision Maker:       Pt declined completion.         Girma Escobedo RN BSN  Care Manager  [phone]

## 2021-06-16 VITALS
BODY MASS INDEX: 25.9 KG/M2 | TEMPERATURE: 98.7 F | SYSTOLIC BLOOD PRESSURE: 107 MMHG | WEIGHT: 165 LBS | OXYGEN SATURATION: 96 % | RESPIRATION RATE: 13 BRPM | DIASTOLIC BLOOD PRESSURE: 72 MMHG | HEART RATE: 88 BPM | HEIGHT: 67 IN

## 2021-06-16 PROCEDURE — 74011250637 HC RX REV CODE- 250/637: Performed by: SURGERY

## 2021-06-16 PROCEDURE — 2709999900 HC NON-CHARGEABLE SUPPLY

## 2021-06-16 RX ORDER — OXYCODONE AND ACETAMINOPHEN 5; 325 MG/1; MG/1
1-2 TABLET ORAL
Qty: 60 TABLET | Refills: 0 | Status: SHIPPED | OUTPATIENT
Start: 2021-06-16 | End: 2021-06-23

## 2021-06-16 RX ADMIN — Medication 10 ML: at 05:40

## 2021-06-16 RX ADMIN — Medication 81 MG: at 10:00

## 2021-06-16 RX ADMIN — HYDROCODONE BITARTRATE AND ACETAMINOPHEN 1 TABLET: 5; 325 TABLET ORAL at 05:40

## 2021-06-16 RX ADMIN — CLOPIDOGREL BISULFATE 75 MG: 75 TABLET ORAL at 10:00

## 2021-06-16 RX ADMIN — HYDROCODONE BITARTRATE AND ACETAMINOPHEN 1 TABLET: 5; 325 TABLET ORAL at 10:02

## 2021-06-16 NOTE — DISCHARGE SUMMARY
Physician Discharge Summary     Patient ID:  Xiao Siegel  [id number]  56 y.o.  1960    Admit date: 6/14/2021    Discharge date: 6/16/2021      Admitting Physician: Genia Perdomo MD     Discharge Physician: Genia Perdomo MD     Admission Diagnoses: Femoral artery occlusion [facility]) [I70.209]    Discharge Diagnoses: Status post repair of right femoral artery occlusion    Procedures for this admission: Procedure(s):  [address]    Discharged Condition: stable    Hospital Course: Admitted to hospital 6/14/2021 for surgery, had right femoral endarterectomy and patch angioplasty. Was observed in the ICU postop without complication. He resumed walking his ischemic symptoms resolved. Consults: None    Significant Diagnostic Studies: Preop angio    Treatments: surgery: Right femoral endarterectomy with patch    Discharge Exam: LUNG: clear to auscultation bilaterally  HEART: S1, S2 normal  ABDOMEN:  no change  Right groin soft and intact, pulses dorsalis pedis    Disposition: home    Patient Instructions:   Cannot display discharge medications since this patient is not currently admitted. Reference discharge instructions as provided by nursing for diet, labs, medications, activity, wound care and any outpatient referrals.     Follow-up with Dr. Rafita Cunha 2 weeks    Signed:  Genia Perdomo MD  6/16/2021  11:24 AM

## 2021-06-16 NOTE — PROGRESS NOTES
CM met with pt at the bedside. Pt requesting a rollator to be discharged home with. CM explained to pt that his insurance will not cover the cost of a rollator and pt would have to pay out of pocket. CM informed pt that he could purchase one outside of the hospital and have it delivered directly to his home. Pt thanked KO for the information.     Eliceo Thompson RN BSN  Care Manager  [phone]

## 2021-06-16 NOTE — PROGRESS NOTES
1915-Received report/assumed care of pt.    2000-Pt assessment done. Pt states his pain is okay for now, denies need for pain medication. Right groin incision with Prevena wound vac intact. 2153-Pt requesting pain medication for 5/10 pain to right groin. Norco given. 0000-Pt resting, no distress noted. 0545-Pt offered bath. Pt refused, states his wife washed him up last night and he will wash up again when she comes. Pt refuses to get out of bed at this time, states he does not feel like getting up yet but will get up later today to get ready to go home.

## 2021-06-16 NOTE — PROGRESS NOTES
Discharge order noted for today. Orders reviewed. No needs identified at this time.  remains available if needed. Pt to be transported home by his wife.     Radha Jimenez RN BSN  Care Manager  [phone]

## 2021-06-16 NOTE — PROGRESS NOTES
Problem: Falls - Risk of  Goal: *Absence of Falls  Description: Document Juanita Guillen Fall Risk and appropriate interventions in the flowsheet. Outcome: Progressing Towards Goal  Note: Fall Risk Interventions:  Mobility Interventions: Bed/chair exit alarm, Communicate number of staff needed for ambulation/transfer, Patient to call before getting OOB, Utilize walker, cane, or other assistive device         Medication Interventions: Bed/chair exit alarm, Evaluate medications/consider consulting pharmacy, Patient to call before getting OOB    Elimination Interventions: Bed/chair exit alarm, Call light in reach, Patient to call for help with toileting needs, Stay With Me (per policy)              Problem: Patient Education: Go to Patient Education Activity  Goal: Patient/Family Education  Outcome: Progressing Towards Goal     Problem: Pain  Goal: *Control of Pain  Outcome: Progressing Towards Goal     Problem: Surgical Wound Care  Goal: *Non-infected Wound: Absence of infection signs and symptoms  Description: Infection control procedures (eg: clean dressings, clean gloves, hand washing, precautions to isolate wound from contamination, sterile instruments used for wound debridement) should be implemented. Outcome: Progressing Towards Goal  Goal: *Infected Wound: Prevention of further infection and promotion of healing  Description: Consider the use of systemic antibiotics in patients with cellulitis, osteomyelitis, bacteremia, or sepsis if there are no contraindications.   Outcome: Progressing Towards Goal  Goal: *Improvement of existing wound and maintenance of skin integrity  Outcome: Progressing Towards Goal

## 2021-06-16 NOTE — DISCHARGE INSTRUCTIONS
Patient Education        Femoral Endarterectomy: What to Expect at [address] had a femoral endarterectomy (say \"FEM--neyda velezwp-ytn-bon-REK-tuh-kenia\"). It was done to remove fatty buildup (plaque) from the femoral artery. You will have some pain from the cut (incision) the doctor made. This usually gets better after a couple of days. Your doctor will give you pain medicine for this. Your leg may be swollen at first. This may last 2 to 3 months. You will have stitches or staples in the incision. If you have stitches, they may dissolve on their own. Or your doctor may take them out 7 to 14 days after your surgery. After surgery, blood may flow better throughout your leg, which can decrease leg pain, numbness, and cramping. You may be able to walk longer distances without leg pain. This care sheet gives you a general idea about how long it will take for you to recover. But each person recovers at a different pace. Follow the steps below to get better as quickly as possible. How can you care for yourself at home? Activity    · Rest when you feel tired. Getting enough sleep will help you recover.     · Try to walk every day or as often as your doctor tells you. Start by walking a little more than you did the day before. Bit by bit, increase the amount you walk. Walking boosts blood flow and helps prevent pneumonia and constipation.     · Avoid strenuous activities, such as bicycle riding, jogging, weight lifting, or aerobic exercise, until your doctor says it is okay.     · Ask your doctor when you can drive again.     · You will probably need to take off 1 to 4 weeks from work. It depends on the type of work you do and how you feel.     · You may shower as usual. Do not take a bath for the first 2 weeks, or until your doctor tells you it is okay. Diet    · You can eat your normal diet.  If your stomach is upset, try bland, low-fat foods like plain rice, broiled chicken, toast, and yogurt.     · Drink plenty of fluids (unless your doctor tells you not to).     · You may notice that your bowel movements are not regular right after your surgery. This is common. You may want to take a fiber supplement every day. If you have not had a bowel movement after a couple of days, ask your doctor about taking a mild laxative. Medicines    · Your doctor will tell you if and when you can restart your medicines. He or she will also give you instructions about taking any new medicines.     · If you take aspirin or some other blood thinner, ask your doctor if and when to start taking it again. Make sure that you understand exactly what your doctor wants you to do.     · Be safe with medicines. Take your medicines exactly as prescribed. Call your doctor if you think you are having a problem with your medicine.     · Take pain medicines exactly as directed. ? If the doctor gave you a prescription medicine for pain, take it as prescribed. ? If you are not taking a prescription pain medicine, ask your doctor if you can take an over-the-counter medicine.     · If you think your pain medicine is making you sick to your stomach:  ? Take your medicine after meals (unless your doctor has told you not to). ? Ask your doctor for a different pain medicine.     · If your doctor prescribed antibiotics, take them as directed. Do not stop taking them just because you feel better. You need to take the full course of antibiotics.     · Your doctor may prescribe a blood thinner when you go home. This helps prevent blood clots. Be sure you get instructions about how to take your medicine safely. Blood thinners can cause serious bleeding problems. Incision care    · If you have strips of tape on the cut (incision) the doctor made, leave the tape on for a week or until it falls off. Or follow your doctor's instructions for removing the tape.     · Wash the area daily with warm, soapy water, and pat it dry.  Don't use hydrogen peroxide or alcohol, which can slow healing. You may cover the area with a gauze bandage if it weeps or rubs against clothing. Change the bandage every day.     · Keep the area clean and dry. Follow-up care is a key part of your treatment and safety. Be sure to make and go to all appointments, and call your doctor if you are having problems. It's also a good idea to know your test results and keep a list of the medicines you take. When should you call for help? Call 911 anytime you think you may need emergency care. For example, call if:    · You passed out (lost consciousness).     · You have trouble breathing. Call your doctor now or seek immediate medical care if:    · You have severe pain in your leg, or it becomes cold, pale, blue, tingly, or numb.     · You have pain that does not get better after you take pain medicine.     · You have loose stitches, or your incision comes open.     · You are bleeding a lot from the incision.     · You have signs of infection, such as:  ? Increased pain, swelling, warmth, or redness. ? Red streaks leading from the incision. ? Pus draining from the incision. ? A fever.     · You are sick to your stomach or cannot keep fluids down. Watch closely for any changes in your health, and be sure to contact your doctor if:    · You do not get better as expected. Where can you learn more? Go to http://www.gray.com/  Enter P886 in the search box to learn more about \"Femoral Endarterectomy: What to Expect at Home. \"  Current as of: August 31, 2020               Content Version: 12.8  © 2006-2021 [employer] Care instructions adapted under license by [employer] (which disclaims liability or warranty for this information). If you have questions about a medical condition or this instruction, always ask your healthcare professional. [address] any warranty or liability for your use of this information.

## 2021-06-17 ENCOUNTER — HOME HEALTH ADMISSION (OUTPATIENT)
Dept: HOME HEALTH SERVICES | Facility: HOME HEALTH | Age: 61
End: 2021-06-17
Payer: MEDICARE

## 2021-06-18 ENCOUNTER — HOME CARE VISIT (OUTPATIENT)
Dept: SCHEDULING | Facility: HOME HEALTH | Age: 61
End: 2021-06-18
Payer: MEDICARE

## 2021-06-18 VITALS
OXYGEN SATURATION: 98 % | DIASTOLIC BLOOD PRESSURE: 66 MMHG | SYSTOLIC BLOOD PRESSURE: 100 MMHG | HEART RATE: 65 BPM | TEMPERATURE: 98.3 F

## 2021-06-18 PROCEDURE — 3331090001 HH PPS REVENUE CREDIT

## 2021-06-18 PROCEDURE — G0151 HHCP-SERV OF PT,EA 15 MIN: HCPCS

## 2021-06-18 PROCEDURE — 400018 HH-NO PAY CLAIM PROCEDURE

## 2021-06-18 PROCEDURE — 3331090002 HH PPS REVENUE DEBIT

## 2021-06-18 PROCEDURE — 400013 HH SOC

## 2021-06-19 PROCEDURE — 3331090002 HH PPS REVENUE DEBIT

## 2021-06-19 PROCEDURE — 3331090001 HH PPS REVENUE CREDIT

## 2021-06-20 ENCOUNTER — HOSPITAL ENCOUNTER (EMERGENCY)
Age: 61
Discharge: HOME | End: 2021-06-20
Attending: STUDENT IN AN ORGANIZED HEALTH CARE EDUCATION/TRAINING PROGRAM
Payer: MEDICARE

## 2021-06-20 VITALS
DIASTOLIC BLOOD PRESSURE: 91 MMHG | OXYGEN SATURATION: 98 % | TEMPERATURE: 99.1 F | RESPIRATION RATE: 18 BRPM | HEART RATE: 98 BPM | SYSTOLIC BLOOD PRESSURE: 122 MMHG

## 2021-06-20 DIAGNOSIS — Z48.01 CHANGE OR REMOVAL OF SURGICAL WOUND DRESSING: Primary | ICD-10-CM

## 2021-06-20 PROCEDURE — 3331090002 HH PPS REVENUE DEBIT

## 2021-06-20 PROCEDURE — 3331090001 HH PPS REVENUE CREDIT

## 2021-06-20 PROCEDURE — 99281 EMR DPT VST MAYX REQ PHY/QHP: CPT

## 2021-06-20 NOTE — ED TRIAGE NOTES
Pt states he had surgery on Monday and was told to have the wound drain removed on Saturday. Pt states he was supposed to remove it himself but was unable to do so.

## 2021-06-20 NOTE — ED PROVIDER NOTES
EMERGENCY DEPARTMENT HISTORY AND PHYSICAL EXAM    Date: 6/20/2021  Patient Name: Ana Martinez. History of Presenting Illness     Chief Complaint   Patient presents with    Post-Op Problem         History Provided By: Patient    Additional History (Context): Ana Freeman is a 56 y.o. male with diabetes, hypertension and Hepatitis C, peripheral vascular disease who presents with wound VAC removal to his right Marquez endarterectomy performed by Dr. Petr Stoddard 6 days ago. He was supposed to remove it yesterday but his wife was \"grossed out\" and is asking that we take care of it for him. Denies any limb pain or foot pain. PCP: Roc Arciniega MD    Current Outpatient Medications   Medication Sig Dispense Refill    hydroCHLOROthiazide 12.5 mg cap 12.5 mg, lisinopriL 20 mg tab 20 mg Take 1 Tablet by mouth daily.  polyethylene glycol (Miralax) 17 gram/dose powder Take 17 g by mouth daily. As needed for constipation, mix with 8 oz water.  oxyCODONE-acetaminophen (PERCOCET) 5-325 mg per tablet Take 1-2 Tablets by mouth every four (4) hours as needed for Pain for up to 7 days. Max Daily Amount: 12 Tablets. 60 Tablet 0    atorvastatin (LIPITOR) 10 mg tablet Take 10 mg by mouth daily.  aspirin delayed-release 81 mg tablet Take 81 mg by mouth daily.  cyclobenzaprine (FLEXERIL) 10 mg tablet Take 10 mg by mouth three (3) times daily as needed for Muscle Spasm(s).  lisinopril-hydroCHLOROthiazide (PRINZIDE, ZESTORETIC) 20-25 mg per tablet Half of a tablet daily      fluticasone propionate (FLONASE NA) 2 Sprays by Both Nostrils route daily.       insulin lispro (HUMALOG) 100 unit/mL injection Sliding scale    180 and above 2 units  250 and above 4 units  299 and above 6 units  300 and above 8 units  350 and above 10 units and call Dr Zelalem Blankenship PCP  Indications: type 2 diabetes mellitus (Patient not taking: Reported on 6/10/2021) 1 Vial 1    lisinopril (PRINIVIL, ZESTRIL) 20 mg tablet Take 1 Tab by mouth daily. (Patient not taking: Reported on 6/10/2021) 30 Tab 1    insulin glargine (LANTUS) 100 unit/mL injection 22 units hs (Patient not taking: Reported on 6/10/2021) 1 Vial 1    metFORMIN (GLUCOPHAGE) 500 mg tablet Take 1 Tab by mouth two (2) times daily (with meals). Indications: type 2 diabetes mellitus (Patient not taking: Reported on 6/10/2021) 100 Tab 1    oxyCODONE IR (ROXICODONE) 5 mg immediate release tablet  (Patient not taking: Reported on 6/14/2021)      amLODIPine (NORVASC) 5 mg tablet Take 5 mg by mouth daily.          Past History     Past Medical History:  Past Medical History:   Diagnosis Date    Chronic pain 2021    RT/LT leg    Diabetes mellitus, new onset ([address] 11/11/2019    no meds now    Hypertension     Liver disease     Hepatitis C- was tx    Low back pain     Lumbar stenosis     L3-L4, L4-L5    Migraine headache     Numbness and tingling in left hand     Radiculopathy     Right L4-L5    Right leg pain     Sensation of cold in leg     Radiating into right posterior buttock and anterior thigh    Unsteady gait        Past Surgical History:  Past Surgical History:   Procedure Laterality Date    COLONOSCOPY N/A 11/6/2018    COLONOSCOPY, SCREENING /c Bx Polypectomy /c Hot Snared Polypectomy performed by Margot Webb MD at [place] [address] N/A 11/9/2018    SIGMOIDOSCOPY FLEXIBLE: ENDO CLIP APPLICATION performed by [facility]ot [facility] at [facility] OR    HX CERVICAL FUSION  05/23/2016    ACDF C3/4 C4/5 C5/6 C6/7, Dr. Sanjuana Gordon  05/25/16    hematoma removal from cervical spine       Family History:  Family History   Problem Relation Age of Onset    Hypertension Mother     Heart Disease Mother     Diabetes Mother     Hypertension Father        Social History:  Social History     Tobacco Use    Smoking status: Former Smoker     Packs/day: 0.50     Years: 40.00     Pack years: 20.00     Types: Cigarettes, Pipe     Quit date: 2016     Years since quittin.1    Smokeless tobacco: Never Used   Vaping Use    Vaping Use: Never used   Substance Use Topics    Alcohol use: Yes     Alcohol/week: 4.0 standard drinks     Types: 4 Glasses of wine per week     Comment: c    Drug use: Not Currently     Types: Marijuana, Cocaine     Comment: states 20-30 years ago       Allergies: Allergies   Allergen Reactions    Cymbalta [Duloxetine] Itching    Percocet [Oxycodone-Acetaminophen] Itching     Denies    Tomato Itching         Review of Systems   Review of Systems   Constitutional: Negative for fever. HENT: Negative. Eyes: Negative. Respiratory: Negative. Cardiovascular: Negative. Negative for leg swelling. Gastrointestinal: Negative. Endocrine: Negative. Genitourinary: Negative. Musculoskeletal: Negative. Skin: Positive for wound. Allergic/Immunologic: Negative. Neurological: Negative. Negative for weakness and numbness. Hematological: Negative. Psychiatric/Behavioral: Negative. All Other Systems Negative  Physical Exam     Vitals:    21 0950   BP: (!) 122/91   Pulse: 98   Resp: 18   Temp: 99.1 °F (37.3 °C)   SpO2: 98%     Physical Exam  Vitals and nursing note reviewed. Constitutional:       General: He is not in acute distress. Appearance: He is well-developed. He is not ill-appearing, toxic-appearing or diaphoretic. HENT:      Head: Normocephalic and atraumatic. Neck:      Thyroid: No thyromegaly. Vascular: No carotid bruit. Trachea: No tracheal deviation. Cardiovascular:      Rate and Rhythm: Normal rate and regular rhythm. Heart sounds: Normal heart sounds. No murmur heard. No friction rub. No gallop. Comments: Right DP pulse is palpable, foot warm nontender. Pulmonary:      Effort: Pulmonary effort is normal. No respiratory distress. Breath sounds: Normal breath sounds. No stridor. No wheezing or rales.    Chest: Chest wall: No tenderness. Abdominal:      General: There is no distension. Palpations: Abdomen is soft. There is no mass. Tenderness: There is no abdominal tenderness. There is no guarding or rebound. Musculoskeletal:         General: Normal range of motion. Cervical back: Normal range of motion and neck supple. Skin:     General: Skin is warm and dry. Coloration: Skin is not pale. Findings: Lesion present. Comments: Right groin: Wound VAC in place. No erythema warmth swelling discharge drainage. Area is nontender. Neurological:      Mental Status: He is alert. Psychiatric:         Speech: Speech normal.         Behavior: Behavior normal.         Thought Content: Thought content normal.         Judgment: Judgment normal.            Diagnostic Study Results     Labs -   No results found for this or any previous visit (from the past 12 hour(s)). Radiologic Studies -   No orders to display     CT Results  (Last 48 hours)    None        CXR Results  (Last 48 hours)    None            Medical Decision Making   I am the first provider for this patient. I reviewed the vital signs, available nursing notes, past medical history, past surgical history, family history and social history. Vital Signs-Reviewed the patient's vital signs. Records Reviewed: Nursing Notes and Old Medical Records    Procedures:  Procedures    Provider Notes (Medical Decision Making): Wound VAC removed and dressing applied. Discharge home and follow-up with Dr. Yamileth Reynolds. MED RECONCILIATION:  No current facility-administered medications for this encounter. Current Outpatient Medications   Medication Sig    hydroCHLOROthiazide 12.5 mg cap 12.5 mg, lisinopriL 20 mg tab 20 mg Take 1 Tablet by mouth daily.  polyethylene glycol (Miralax) 17 gram/dose powder Take 17 g by mouth daily. As needed for constipation, mix with 8 oz water.     oxyCODONE-acetaminophen (PERCOCET) 5-325 mg per tablet Take 1-2 Tablets by mouth every four (4) hours as needed for Pain for up to 7 days. Max Daily Amount: 12 Tablets.  atorvastatin (LIPITOR) 10 mg tablet Take 10 mg by mouth daily.  aspirin delayed-release 81 mg tablet Take 81 mg by mouth daily.  cyclobenzaprine (FLEXERIL) 10 mg tablet Take 10 mg by mouth three (3) times daily as needed for Muscle Spasm(s).  lisinopril-hydroCHLOROthiazide (PRINZIDE, ZESTORETIC) 20-25 mg per tablet Half of a tablet daily    fluticasone propionate (FLONASE NA) 2 Sprays by Both Nostrils route daily.  insulin lispro (HUMALOG) 100 unit/mL injection Sliding scale    180 and above 2 units  250 and above 4 units  299 and above 6 units  300 and above 8 units  350 and above 10 units and call Dr Nilson Stuart PCP  Indications: type 2 diabetes mellitus (Patient not taking: Reported on 6/10/2021)    lisinopril (PRINIVIL, ZESTRIL) 20 mg tablet Take 1 Tab by mouth daily. (Patient not taking: Reported on 6/10/2021)    insulin glargine (LANTUS) 100 unit/mL injection 22 units hs (Patient not taking: Reported on 6/10/2021)    metFORMIN (GLUCOPHAGE) 500 mg tablet Take 1 Tab by mouth two (2) times daily (with meals). Indications: type 2 diabetes mellitus (Patient not taking: Reported on 6/10/2021)    oxyCODONE IR (ROXICODONE) 5 mg immediate release tablet  (Patient not taking: Reported on 6/14/2021)    amLODIPine (NORVASC) 5 mg tablet Take 5 mg by mouth daily. Disposition:  home    DISCHARGE NOTE:   10:50 AM    Pt has been reexamined. Patient has no new complaints, changes, or physical findings. Care plan outlined and precautions discussed. Results of exam were reviewed with the patient. All medications were reviewed with the patient. All of pt's questions and concerns were addressed. Patient was instructed and agrees to follow up with vascular surgery, as well as to return to the ED upon further deterioration. Patient is ready to go home.     Follow-up Information     Follow up With Specialties Details Why 29 Isaura Cobian MD Vascular Surgery Schedule an appointment as soon as possible for a visit in 1 day  [address]      [facility] EMERGENCY DEPT Emergency Medicine  If symptoms worsen return immediately [address]  [phone]          Current Discharge Medication List            Diagnosis     Clinical Impression:   1.  Change or removal of surgical wound dressing

## 2021-06-21 ENCOUNTER — HOME CARE VISIT (OUTPATIENT)
Dept: SCHEDULING | Facility: HOME HEALTH | Age: 61
End: 2021-06-21
Payer: MEDICARE

## 2021-06-21 ENCOUNTER — HOME CARE VISIT (OUTPATIENT)
Dept: HOME HEALTH SERVICES | Facility: HOME HEALTH | Age: 61
End: 2021-06-21
Payer: MEDICARE

## 2021-06-21 VITALS
SYSTOLIC BLOOD PRESSURE: 115 MMHG | DIASTOLIC BLOOD PRESSURE: 74 MMHG | OXYGEN SATURATION: 98 % | TEMPERATURE: 98.4 F | HEART RATE: 75 BPM

## 2021-06-21 PROCEDURE — 3331090001 HH PPS REVENUE CREDIT

## 2021-06-21 PROCEDURE — G0157 HHC PT ASSISTANT EA 15: HCPCS

## 2021-06-21 PROCEDURE — 3331090002 HH PPS REVENUE DEBIT

## 2021-06-22 PROCEDURE — 3331090002 HH PPS REVENUE DEBIT

## 2021-06-22 PROCEDURE — 3331090001 HH PPS REVENUE CREDIT

## 2021-06-23 ENCOUNTER — HOME CARE VISIT (OUTPATIENT)
Dept: SCHEDULING | Facility: HOME HEALTH | Age: 61
End: 2021-06-23
Payer: MEDICARE

## 2021-06-23 PROCEDURE — 3331090001 HH PPS REVENUE CREDIT

## 2021-06-23 PROCEDURE — 3331090002 HH PPS REVENUE DEBIT

## 2021-06-24 PROCEDURE — 3331090001 HH PPS REVENUE CREDIT

## 2021-06-24 PROCEDURE — 3331090002 HH PPS REVENUE DEBIT

## 2021-06-25 ENCOUNTER — HOME CARE VISIT (OUTPATIENT)
Dept: SCHEDULING | Facility: HOME HEALTH | Age: 61
End: 2021-06-25
Payer: MEDICARE

## 2021-06-25 PROCEDURE — G0157 HHC PT ASSISTANT EA 15: HCPCS

## 2021-06-25 PROCEDURE — 3331090001 HH PPS REVENUE CREDIT

## 2021-06-25 PROCEDURE — 3331090002 HH PPS REVENUE DEBIT

## 2021-06-26 VITALS
HEART RATE: 60 BPM | TEMPERATURE: 97.7 F | DIASTOLIC BLOOD PRESSURE: 63 MMHG | OXYGEN SATURATION: 98 % | SYSTOLIC BLOOD PRESSURE: 105 MMHG

## 2021-06-26 PROCEDURE — 3331090001 HH PPS REVENUE CREDIT

## 2021-06-26 PROCEDURE — 3331090002 HH PPS REVENUE DEBIT

## 2021-06-27 PROCEDURE — 3331090001 HH PPS REVENUE CREDIT

## 2021-06-27 PROCEDURE — 3331090002 HH PPS REVENUE DEBIT

## 2021-06-28 ENCOUNTER — HOME CARE VISIT (OUTPATIENT)
Dept: SCHEDULING | Facility: HOME HEALTH | Age: 61
End: 2021-06-28
Payer: MEDICARE

## 2021-06-28 VITALS
TEMPERATURE: 98.2 F | SYSTOLIC BLOOD PRESSURE: 125 MMHG | OXYGEN SATURATION: 99 % | HEART RATE: 80 BPM | DIASTOLIC BLOOD PRESSURE: 81 MMHG

## 2021-06-28 PROCEDURE — G0157 HHC PT ASSISTANT EA 15: HCPCS

## 2021-06-28 PROCEDURE — 3331090001 HH PPS REVENUE CREDIT

## 2021-06-28 PROCEDURE — 3331090002 HH PPS REVENUE DEBIT

## 2021-06-29 PROCEDURE — 3331090001 HH PPS REVENUE CREDIT

## 2021-06-29 PROCEDURE — 3331090002 HH PPS REVENUE DEBIT

## 2021-06-30 ENCOUNTER — HOME CARE VISIT (OUTPATIENT)
Dept: HOME HEALTH SERVICES | Facility: HOME HEALTH | Age: 61
End: 2021-06-30
Payer: MEDICARE

## 2021-06-30 PROCEDURE — 3331090002 HH PPS REVENUE DEBIT

## 2021-06-30 PROCEDURE — 3331090001 HH PPS REVENUE CREDIT

## 2021-07-01 PROCEDURE — 3331090002 HH PPS REVENUE DEBIT

## 2021-07-01 PROCEDURE — 3331090001 HH PPS REVENUE CREDIT

## 2021-07-02 ENCOUNTER — HOME CARE VISIT (OUTPATIENT)
Dept: HOME HEALTH SERVICES | Facility: HOME HEALTH | Age: 61
End: 2021-07-02
Payer: MEDICARE

## 2021-07-02 PROCEDURE — 3331090002 HH PPS REVENUE DEBIT

## 2021-07-02 PROCEDURE — 3331090001 HH PPS REVENUE CREDIT

## 2021-07-03 PROCEDURE — 3331090001 HH PPS REVENUE CREDIT

## 2021-07-03 PROCEDURE — 3331090002 HH PPS REVENUE DEBIT

## 2021-07-04 PROCEDURE — 3331090001 HH PPS REVENUE CREDIT

## 2021-07-04 PROCEDURE — 3331090002 HH PPS REVENUE DEBIT

## 2021-07-05 PROCEDURE — 3331090001 HH PPS REVENUE CREDIT

## 2021-07-05 PROCEDURE — 3331090002 HH PPS REVENUE DEBIT

## 2021-07-06 ENCOUNTER — HOME CARE VISIT (OUTPATIENT)
Dept: SCHEDULING | Facility: HOME HEALTH | Age: 61
End: 2021-07-06
Payer: MEDICARE

## 2021-07-06 PROCEDURE — 3331090002 HH PPS REVENUE DEBIT

## 2021-07-06 PROCEDURE — 3331090001 HH PPS REVENUE CREDIT

## 2021-07-06 PROCEDURE — G0157 HHC PT ASSISTANT EA 15: HCPCS

## 2021-07-07 VITALS — TEMPERATURE: 98.4 F | SYSTOLIC BLOOD PRESSURE: 115 MMHG | DIASTOLIC BLOOD PRESSURE: 80 MMHG

## 2021-07-07 PROCEDURE — 3331090001 HH PPS REVENUE CREDIT

## 2021-07-07 PROCEDURE — 3331090002 HH PPS REVENUE DEBIT

## 2021-07-08 ENCOUNTER — HOME CARE VISIT (OUTPATIENT)
Dept: SCHEDULING | Facility: HOME HEALTH | Age: 61
End: 2021-07-08
Payer: MEDICARE

## 2021-07-08 ENCOUNTER — HOME CARE VISIT (OUTPATIENT)
Dept: HOME HEALTH SERVICES | Facility: HOME HEALTH | Age: 61
End: 2021-07-08
Payer: MEDICARE

## 2021-07-08 VITALS
SYSTOLIC BLOOD PRESSURE: 133 MMHG | TEMPERATURE: 98.2 F | OXYGEN SATURATION: 99 % | HEART RATE: 67 BPM | DIASTOLIC BLOOD PRESSURE: 81 MMHG

## 2021-07-08 PROCEDURE — 3331090001 HH PPS REVENUE CREDIT

## 2021-07-08 PROCEDURE — G0151 HHCP-SERV OF PT,EA 15 MIN: HCPCS

## 2021-07-08 PROCEDURE — 3331090002 HH PPS REVENUE DEBIT

## 2021-07-09 PROCEDURE — 3331090001 HH PPS REVENUE CREDIT

## 2021-07-09 PROCEDURE — 3331090002 HH PPS REVENUE DEBIT

## 2021-07-10 PROCEDURE — 3331090001 HH PPS REVENUE CREDIT

## 2021-07-10 PROCEDURE — 3331090002 HH PPS REVENUE DEBIT

## 2022-01-04 ENCOUNTER — HOSPITAL ENCOUNTER (OUTPATIENT)
Dept: GENERAL RADIOLOGY | Age: 62
Discharge: HOME | End: 2022-01-04
Payer: MEDICARE

## 2022-01-04 ENCOUNTER — TRANSCRIBE ORDER (OUTPATIENT)
Dept: REGISTRATION | Age: 62
End: 2022-01-04

## 2022-01-04 DIAGNOSIS — M19.041 ARTHRITIS OF RIGHT HAND: Primary | ICD-10-CM

## 2022-01-04 DIAGNOSIS — M19.041 ARTHRITIS OF RIGHT HAND: ICD-10-CM

## 2022-01-04 PROCEDURE — 73130 X-RAY EXAM OF HAND: CPT

## 2022-03-18 PROBLEM — Z79.891 ENCOUNTER FOR LONG-TERM USE OF OPIATE ANALGESIC: Status: ACTIVE | Noted: 2018-06-11

## 2022-03-19 PROBLEM — K62.5 RECTAL BLEEDING: Status: ACTIVE | Noted: 2018-11-08

## 2022-03-19 PROBLEM — F10.20 CHRONIC ALCOHOL DEPENDENCE, CONTINUOUS (HCC): Status: ACTIVE | Noted: 2019-11-12

## 2022-03-19 PROBLEM — E11.9 DIABETES MELLITUS (HCC): Status: ACTIVE | Noted: 2019-11-12

## 2022-03-19 PROBLEM — I70.209 FEMORAL ARTERY OCCLUSION (HCC): Status: ACTIVE | Noted: 2021-06-14

## 2022-03-19 PROBLEM — E11.9 DIABETES MELLITUS, NEW ONSET (HCC): Status: ACTIVE | Noted: 2019-11-11

## 2022-03-20 PROBLEM — M48.061 LUMBAR SPINAL STENOSIS: Status: ACTIVE | Noted: 2017-08-31

## 2022-06-06 PROBLEM — J18.9 RIGHT UPPER LOBE PNEUMONIA: Status: ACTIVE | Noted: 2022-06-06

## 2022-06-06 PROBLEM — R11.2 NAUSEA AND VOMITING: Status: ACTIVE | Noted: 2022-06-06

## 2022-06-06 PROBLEM — J18.9 COMMUNITY ACQUIRED PNEUMONIA: Status: ACTIVE | Noted: 2022-06-06

## 2022-06-06 PROBLEM — R50.9 FEVER: Status: ACTIVE | Noted: 2022-06-06

## 2022-09-05 ENCOUNTER — HOSPITAL ENCOUNTER (EMERGENCY)
Age: 62
Discharge: HOME | End: 2022-09-05
Attending: STUDENT IN AN ORGANIZED HEALTH CARE EDUCATION/TRAINING PROGRAM
Payer: MEDICARE

## 2022-09-05 VITALS
SYSTOLIC BLOOD PRESSURE: 127 MMHG | OXYGEN SATURATION: 98 % | RESPIRATION RATE: 18 BRPM | TEMPERATURE: 99.3 F | DIASTOLIC BLOOD PRESSURE: 83 MMHG | HEART RATE: 93 BPM

## 2022-09-05 DIAGNOSIS — U07.1 COVID-19: Primary | ICD-10-CM

## 2022-09-05 LAB
DEPRECATED S PYO AG THROAT QL EIA: NEGATIVE
FLUAV RNA SPEC QL NAA+PROBE: NOT DETECTED
FLUBV RNA SPEC QL NAA+PROBE: NOT DETECTED
SARS-COV-2, COV2: DETECTED

## 2022-09-05 PROCEDURE — 87070 CULTURE OTHR SPECIMN AEROBIC: CPT

## 2022-09-05 PROCEDURE — 99283 EMERGENCY DEPT VISIT LOW MDM: CPT

## 2022-09-05 PROCEDURE — 87636 SARSCOV2 & INF A&B AMP PRB: CPT

## 2022-09-05 PROCEDURE — 87880 STREP A ASSAY W/OPTIC: CPT

## 2022-09-05 RX ORDER — ACETAMINOPHEN 325 MG/1
650 TABLET ORAL
Qty: 20 TABLET | Refills: 0 | Status: SHIPPED | OUTPATIENT
Start: 2022-09-05 | End: 2022-10-12 | Stop reason: SDUPTHER

## 2022-09-05 RX ORDER — IBUPROFEN 600 MG/1
600 TABLET ORAL
Qty: 20 TABLET | Refills: 0 | Status: SHIPPED | OUTPATIENT
Start: 2022-09-05

## 2022-09-05 NOTE — Clinical Note
[address] [place] [address] Dr [facility] EMERGENCY DEPT  [address] [address] [phone]    Work/School Note    Date: 9/5/2022     To Whom It May concern:    Lavern Corona. was evaluated by the following provider(s):  Attending Provider: Callum Allison MD  Physician Assistant: Violet Garcia, [address] [place] [address] virus is suspected. Per the CDC guidelines we recommend home isolation until the following conditions are all met:    1. At least five days have passed since symptoms first appeared and/or had a close exposure,   2. After home isolation for five days, wearing a mask around others for the next five days,  3. At least 24 have passed since last fever without the use of fever-reducing medications and  4.  Symptoms (eg cough, shortness of breath) have improved      Sincerely,          MENDOZA Childress

## 2022-09-05 NOTE — Clinical Note
[address] [place] [address] Dr [facility] EMERGENCY DEPT  [address] [address] [phone]    Work/School Note    Date: 9/5/2022     To Whom It May concern:    Yudelka Contreras. was evaluated by the following provider(s):  Attending Provider: Annabel Khan MD  Physician Assistant: Rosa Ledesma, [address] [place] [address] virus is suspected. Per the CDC guidelines we recommend home isolation until the following conditions are all met:    1. At least five days have passed since symptoms first appeared and/or had a close exposure,   2. After home isolation for five days, wearing a mask around others for the next five days,  3. At least 24 have passed since last fever without the use of fever-reducing medications and  4.  Symptoms (eg cough, shortness of breath) have improved      Sincerely,          MENDOZA Macdonald

## 2022-09-05 NOTE — ED PROVIDER NOTES
EMERGENCY DEPARTMENT HISTORY AND PHYSICAL EXAM    Date: 9/5/2022  Patient Name: Emilee Macias. History of Presenting Illness     Chief Complaint   Patient presents with    Cough    Sore Throat    Back Pain         History Provided By: Patient    Chief Complaint: Sore throat, body aches/back pain, cough  Duration: Couple days  Timing: Gradual  Location: Throat  Quality: \"Like I have strep throat\"  Severity: Moderate  Modifying Factors: None  Associated Symptoms: none       Additional History (Context): Emilee Newberry is a 57 y.o. male with a history of liver disease and hypertension who presents today for issues listed above. Patient reports he has been having a sore throat that feels similar to strep throat. Denies any known sick contacts or recent travel. Has not tried thing for this at home. Denies any dental concerns, fever, chills, or facial swelling. PCP: Mague Sims MD    Current Outpatient Medications   Medication Sig Dispense Refill    ibuprofen (MOTRIN) 600 mg tablet Take 1 Tablet by mouth every six (6) hours as needed for Pain. 20 Tablet 0    acetaminophen (TYLENOL) 325 mg tablet Take 2 Tablets by mouth every four (4) hours as needed for Pain. 20 Tablet 0    levoFLOXacin (LEVAQUIN) 500 mg tablet Take 1.5 Tablets by mouth daily. Take 1-2 hour prior to procedure 3 Tablet 0    omeprazole (PRILOSEC) 40 mg capsule Take 1 Capsule by mouth two (2) times a day. 60 Capsule 2    albuterol (ProAir HFA) 90 mcg/actuation inhaler Take 2 Puffs by inhalation every four (4) hours as needed for Wheezing. (Patient not taking: Reported on 6/6/2022) 1 Each 0    acetaminophen (TYLENOL) 325 mg tablet Take 2 Tablets by mouth every six (6) hours as needed for Pain.  20 Tablet 0    Accu-Chek Jolynn Control Soln soln       Accu-Chek Jolynn Plus test strp strip       ID NOW COVID-19 Test Kit kit TEST AS DIRECTED TODAY      ketoconazole (NIZORAL) 2 % shampoo       triamcinolone acetonide (KENALOG) 0.1 % ointment       hydroCHLOROthiazide 12.5 mg cap 12.5 mg, lisinopriL 20 mg tab 20 mg Take 1 Tablet by mouth daily. atorvastatin (LIPITOR) 10 mg tablet Take 10 mg by mouth daily. aspirin delayed-release 81 mg tablet Take 81 mg by mouth daily. cyclobenzaprine (FLEXERIL) 10 mg tablet Take 10 mg by mouth three (3) times daily as needed for Muscle Spasm(s). fluticasone propionate (FLONASE NA) 2 Sprays by Both Nostrils route daily. lisinopril (PRINIVIL, ZESTRIL) 20 mg tablet Take 1 Tab by mouth daily. 30 Tab 1    amLODIPine (NORVASC) 5 mg tablet Take 5 mg by mouth daily.          Past History     Past Medical History:  Past Medical History:   Diagnosis Date    Chronic pain 2021    RT/LT leg    Diabetes mellitus, new onset ([facility] [address] 11/11/2019    no meds now    Hypertension     Liver disease     Hepatitis C- was tx    Low back pain     Lumbar stenosis     L3-L4, L4-L5    Migraine headache     Numbness and tingling in left hand     Radiculopathy     Right L4-L5    Right leg pain     Sensation of cold in leg     Radiating into right posterior buttock and anterior thigh    Unsteady gait        Past Surgical History:  Past Surgical History:   Procedure Laterality Date    COLONOSCOPY N/A 11/6/2018    COLONOSCOPY, SCREENING /c Bx Polypectomy /c Hot Snared Polypectomy performed by Joseph Martinez MD at [address] [place] [address] B N/A 11/9/2018    SIGMOIDOSCOPY FLEXIBLE: ENDO CLIP APPLICATION performed by Joseph Martinez MD at [address] [place] [address]  05/23/2016    ACDF C3/4 C4/5 C5/6 C6/7, Dr. Milli Hall  05/25/16    hematoma removal from cervical spine       Family History:  Family History   Problem Relation Age of Onset    Hypertension Mother     Heart Disease Mother     Diabetes Mother     Hypertension Father        Social History:  Social History     Tobacco Use    Smoking status: Former     Packs/day: 0.50     Years: 40.00     Pack years: 20.00 Types: Cigarettes, Pipe     Quit date: 2016     Years since quittin.3    Smokeless tobacco: Never   Vaping Use    Vaping Use: Never used   Substance Use Topics    Alcohol use: Yes     Alcohol/week: 4.0 standard drinks     Types: 4 Glasses of wine per week     Comment: c    Drug use: Not Currently     Types: Marijuana, Cocaine     Comment: states 20-30 years ago       Allergies: Allergies   Allergen Reactions    Cymbalta [Duloxetine] Itching    Percocet [Oxycodone-Acetaminophen] Itching     Denies    Tomato Itching         Review of Systems   Review of Systems   Constitutional:  Negative for chills and fever. HENT:  Positive for sore throat. Negative for congestion and rhinorrhea. Respiratory:  Positive for cough. Negative for shortness of breath. Cardiovascular:  Negative for chest pain. Gastrointestinal:  Negative for abdominal pain, blood in stool, constipation, diarrhea, nausea and vomiting. Genitourinary:  Negative for dysuria, frequency and hematuria. Musculoskeletal:  Positive for back pain and myalgias. Skin:  Negative for rash and wound. Neurological:  Negative for dizziness and headaches. All other systems reviewed and are negative. All Other Systems Negative  Physical Exam     Vitals:    22 1357   BP: 127/83   Pulse: 93   Resp: 18   Temp: 99.3 °F (37.4 °C)   SpO2: 98%     Physical Exam  Vitals and nursing note reviewed. Constitutional:       General: He is not in acute distress. Appearance: He is well-developed. He is not diaphoretic. Comments: Well appearing, non toxic     HENT:      Head: Normocephalic and atraumatic. Mouth/Throat:      Pharynx: Oropharynx is clear. Uvula midline. No pharyngeal swelling or posterior oropharyngeal erythema. Tonsils: No tonsillar exudate. 1+ on the right. 1+ on the left. Eyes:      Conjunctiva/sclera: Conjunctivae normal.   Cardiovascular:      Rate and Rhythm: Normal rate and regular rhythm.       Heart sounds: Normal heart sounds. Pulmonary:      Effort: Pulmonary effort is normal. No respiratory distress. Breath sounds: Normal breath sounds. Comments: No resp distress   Chest:      Chest wall: No tenderness. Abdominal:      General: Bowel sounds are normal. There is no distension. Palpations: Abdomen is soft. Tenderness: There is no abdominal tenderness. There is no guarding or rebound. Musculoskeletal:         General: No deformity. Normal range of motion. Cervical back: Normal range of motion and neck supple. Skin:     General: Skin is warm and dry. Neurological:      Mental Status: He is alert and oriented to person, place, and time. Diagnostic Study Results     Labs -     Recent Results (from the past 12 hour(s))   STREP AG SCREEN, GROUP A    Collection Time: 09/05/22  2:01 PM    Specimen: Throat   Result Value Ref Range    Group A Strep Ag ID Negative         Radiologic Studies -   No orders to display     CT Results  (Last 48 hours)      None          CXR Results  (Last 48 hours)      None              Medical Decision Making   I am the first provider for this patient. I reviewed the vital signs, available nursing notes, past medical history, past surgical history, family history and social history. Vital Signs-Reviewed the patient's vital signs. Records Reviewed: Nursing Notes and Old Medical Records     Procedures: None   Procedures    Provider Notes (Medical Decision Making):     Differential Diagnosis:  influenza, mononucleosis, acute bronchitis, URI, streptococcal pharyngitis, pneumonia, asthma exacerbation, allergic rhinitis, seasonal allergies, COVID    Plan: Will screen for covid, influenza and strep throat     3:03 PM  Patient has been notified of his positive COVID results. Will discharge home with Tylenol Motrin to alternate throughout the day. Have advised rest hydration home isolation. Strict return precautions have been given.   Will discharge home.           MED RECONCILIATION:  No current facility-administered medications for this encounter. Current Outpatient Medications   Medication Sig    ibuprofen (MOTRIN) 600 mg tablet Take 1 Tablet by mouth every six (6) hours as needed for Pain. acetaminophen (TYLENOL) 325 mg tablet Take 2 Tablets by mouth every four (4) hours as needed for Pain.    levoFLOXacin (LEVAQUIN) 500 mg tablet Take 1.5 Tablets by mouth daily. Take 1-2 hour prior to procedure    omeprazole (PRILOSEC) 40 mg capsule Take 1 Capsule by mouth two (2) times a day. albuterol (ProAir HFA) 90 mcg/actuation inhaler Take 2 Puffs by inhalation every four (4) hours as needed for Wheezing. (Patient not taking: Reported on 6/6/2022)    acetaminophen (TYLENOL) 325 mg tablet Take 2 Tablets by mouth every six (6) hours as needed for Pain. Accu-Chek Jolynn Control Soln soln     Accu-Chek Jolynn Plus test strp strip     ID NOW COVID-19 Test Kit kit TEST AS DIRECTED TODAY    ketoconazole (NIZORAL) 2 % shampoo     triamcinolone acetonide (KENALOG) 0.1 % ointment     hydroCHLOROthiazide 12.5 mg cap 12.5 mg, lisinopriL 20 mg tab 20 mg Take 1 Tablet by mouth daily. atorvastatin (LIPITOR) 10 mg tablet Take 10 mg by mouth daily. aspirin delayed-release 81 mg tablet Take 81 mg by mouth daily. cyclobenzaprine (FLEXERIL) 10 mg tablet Take 10 mg by mouth three (3) times daily as needed for Muscle Spasm(s). fluticasone propionate (FLONASE NA) 2 Sprays by Both Nostrils route daily. lisinopril (PRINIVIL, ZESTRIL) 20 mg tablet Take 1 Tab by mouth daily. amLODIPine (NORVASC) 5 mg tablet Take 5 mg by mouth daily. Disposition:  Home     DISCHARGE NOTE:   Pt has been reexamined. Patient has no new complaints, changes, or physical findings. Care plan outlined and precautions discussed. Results of workup were reviewed with the patient. All medications were reviewed with the patient. All of pt's questions and concerns were addressed.  Patient was instructed and agrees to follow up with PCP as well as to return to the ED upon further deterioration. Patient is ready to go home. Follow-up Information       Follow up With Specialties Details Why Contact Info    [facility] EMERGENCY DEPT Emergency Medicine  As needed [address]  [phone]    Lorenza Martinez MD Family Medicine Schedule an appointment as soon as possible for a visit   [address]  [phone]              Current Discharge Medication List        START taking these medications    Details   ibuprofen (MOTRIN) 600 mg tablet Take 1 Tablet by mouth every six (6) hours as needed for Pain. Qty: 20 Tablet, Refills: 0  Start date: 9/5/2022      !! acetaminophen (TYLENOL) 325 mg tablet Take 2 Tablets by mouth every four (4) hours as needed for Pain. Qty: 20 Tablet, Refills: 0  Start date: 9/5/2022       !! - Potential duplicate medications found. Please discuss with provider. CONTINUE these medications which have NOT CHANGED    Details   !! acetaminophen (TYLENOL) 325 mg tablet Take 2 Tablets by mouth every six (6) hours as needed for Pain. Qty: 20 Tablet, Refills: 0       !! - Potential duplicate medications found. Please discuss with provider. Diagnosis     Clinical Impression:   1. COVID-19          \"Please note that this dictation was completed with [employer], the computer voice recognition software. Quite often unanticipated grammatical, syntax, homophones, and other interpretive errors are inadvertently transcribed by the computer software. Please disregard these errors. Please excuse any errors that have escaped final proofreading. \"

## 2022-09-06 ENCOUNTER — PATIENT OUTREACH (OUTPATIENT)
Dept: CASE MANAGEMENT | Age: 62
End: 2022-09-06

## 2022-09-06 NOTE — PROGRESS NOTES
Patient contacted regarding COVID-19 diagnosis. Discussed COVID-19 related testing which was available at this time. Test results were positive. Patient informed of results, if available? yes. Care Transition Nurse contacted the patient by telephone to perform post discharge assessment. Call within 2 business days of discharge: Yes Verified name and  with patient as identifiers. Provided introduction to self, and explanation of the CTN/ACM role, and reason for call due to risk factors for infection and/or exposure to COVID-19. Symptoms reviewed with patient who verbalized the following symptoms: pain or aching joints, cough, and headache      Due to no new or worsening symptoms encounter was not routed to provider for escalation. Discussed follow-up appointments. If no appointment was previously scheduled, appointment scheduling offered:  no. [facility] follow up appointment(s):   Future Appointments   Date Time Provider Nadya Christina   10/18/2022  9:10 AM [facility] [place] TECH NURSE Health system LISSETTE GOTTI   10/25/2022  1:30 PM MD [address]     [id number] [address] follow up appointment(s): none    Interventions to address risk factors: Obtained and reviewed discharge summary and/or continuity of care documents     Advance Care Planning:   Does patient have an Advance Directive: not on file. Educated patient about risk for severe COVID-19 due to risk factors according to CDC guidelines. CTN reviewed discharge instructions, medical action plan and red flag symptoms with the patient who verbalized understanding. Discussed COVID vaccination status: yes. Education provided on COVID-19 vaccination as appropriate. Discussed exposure protocols and quarantine with CDC Guidelines. Patient was given an opportunity to verbalize any questions and concerns and agrees to contact CTN or health care provider for questions related to their healthcare.     Reviewed and educated patient on any new and changed medications related to discharge diagnosis     Was patient discharged with a pulse oximeter? no    CTN provided contact information. Plan for follow-up call in 5-7 days based on severity of symptoms and risk factors.

## 2022-09-07 LAB
BACTERIA SPEC CULT: NORMAL
SERVICE CMNT-IMP: NORMAL

## 2022-09-13 ENCOUNTER — PATIENT OUTREACH (OUTPATIENT)
Dept: CASE MANAGEMENT | Age: 62
End: 2022-09-13

## 2022-09-13 NOTE — PROGRESS NOTES
Patient contacted regarding COVID-19 diagnosis. Discussed COVID-19 related testing which was available at this time. Test results were positive. Patient informed of results, if available? yes      Care Transition Nurse contacted the patient by telephone to perform follow-up assessment. No answer. Left message. Spoke with Mariah Pérez, wife. PHI verified. Verified name and  with family as identifiers. Patient has following risk factors of: diabetes. Symptoms reviewed with family who verbalized the following symptoms: no new symptoms and no worsening symptoms. Due to no new or worsening symptoms encounter was not routed to provider for escalation. Interventions to address risk factors:  none    Educated patient about risk for severe COVID-19 due to risk factors according to CDC guidelines. CTN reviewed discharge instructions, medical action plan and red flag symptoms with the family who verbalized understanding. Discussed COVID vaccination status: yes. Education provided on COVID-19 vaccination as appropriate. Discussed exposure protocols and quarantine with CDC Guidelines. Family was given an opportunity to verbalize any questions and concerns and agrees to contact CTN or health care provider for questions related to their healthcare. Reviewed and educated family on any new and changed medications related to discharge diagnosis     Was patient discharged with a pulse oximeter? no    CTN provided contact information. No further follow-up call identified based on severity of symptoms and risk factors. Patient resolved from [address] Transitions episode on 22. Discussed COVID-19 related testing which was available at this time. Test results were positive.  Patient informed of results, if available? yes     Patient/family has been provided the following resources and education related to COVID-19:                         Signs, symptoms and red flags related to COVID-19            CDC exposure and quarantine guidelines            [facility] exposure contact - [phone]            Contact for their local Department of Health                 Patient currently reports that the following symptoms have improved:  no new symptoms and no worsening symptoms. No further outreach scheduled with this CTN/ACM/LPN/HC/ MA. Episode of Care resolved. Patient has this CTN/ACM/LPN/HC/MA contact information if future needs arise.

## 2022-10-12 ENCOUNTER — HOSPITAL ENCOUNTER (EMERGENCY)
Age: 62
Discharge: HOME | End: 2022-10-12
Attending: EMERGENCY MEDICINE
Payer: MEDICARE

## 2022-10-12 ENCOUNTER — APPOINTMENT (OUTPATIENT)
Dept: CT IMAGING | Age: 62
End: 2022-10-12
Attending: PHYSICIAN ASSISTANT
Payer: MEDICARE

## 2022-10-12 VITALS
TEMPERATURE: 98.8 F | HEART RATE: 87 BPM | SYSTOLIC BLOOD PRESSURE: 135 MMHG | OXYGEN SATURATION: 99 % | RESPIRATION RATE: 16 BRPM | DIASTOLIC BLOOD PRESSURE: 97 MMHG | BODY MASS INDEX: 27.48 KG/M2 | WEIGHT: 171 LBS | HEIGHT: 66 IN

## 2022-10-12 DIAGNOSIS — R10.9 LEFT FLANK PAIN: Primary | ICD-10-CM

## 2022-10-12 LAB
ALBUMIN SERPL-MCNC: 4.3 G/DL (ref 3.4–5)
ALBUMIN/GLOB SERPL: 1.2 {RATIO} (ref 0.8–1.7)
ALP SERPL-CCNC: 75 U/L (ref 45–117)
ALT SERPL-CCNC: 21 U/L (ref 16–61)
ANION GAP SERPL CALC-SCNC: 6 MMOL/L (ref 3–18)
APPEARANCE UR: CLEAR
AST SERPL-CCNC: 20 U/L (ref 10–38)
BASOPHILS # BLD: 0.1 K/UL (ref 0–0.1)
BASOPHILS NFR BLD: 1 % (ref 0–2)
BILIRUB SERPL-MCNC: 0.4 MG/DL (ref 0.2–1)
BILIRUB UR QL: NEGATIVE
BUN SERPL-MCNC: 14 MG/DL (ref 7–18)
BUN/CREAT SERPL: 13 (ref 12–20)
CALCIUM SERPL-MCNC: 9 MG/DL (ref 8.5–10.1)
CHLORIDE SERPL-SCNC: 106 MMOL/L (ref 100–111)
CO2 SERPL-SCNC: 28 MMOL/L (ref 21–32)
COLOR UR: YELLOW
CREAT SERPL-MCNC: 1.09 MG/DL (ref 0.6–1.3)
DIFFERENTIAL METHOD BLD: ABNORMAL
EOSINOPHIL # BLD: 0.5 K/UL (ref 0–0.4)
EOSINOPHIL NFR BLD: 4 % (ref 0–5)
ERYTHROCYTE [DISTWIDTH] IN BLOOD BY AUTOMATED COUNT: 14.6 % (ref 11.6–14.5)
GLOBULIN SER CALC-MCNC: 3.6 G/DL (ref 2–4)
GLUCOSE SERPL-MCNC: 111 MG/DL (ref 74–99)
GLUCOSE UR STRIP.AUTO-MCNC: NEGATIVE MG/DL
HCT VFR BLD AUTO: 39.6 % (ref 36–48)
HGB BLD-MCNC: 13.3 G/DL (ref 13–16)
HGB UR QL STRIP: NEGATIVE
IMM GRANULOCYTES # BLD AUTO: 0 K/UL (ref 0–0.04)
IMM GRANULOCYTES NFR BLD AUTO: 0 % (ref 0–0.5)
KETONES UR QL STRIP.AUTO: NEGATIVE MG/DL
LEUKOCYTE ESTERASE UR QL STRIP.AUTO: NEGATIVE
LYMPHOCYTES # BLD: 3.1 K/UL (ref 0.9–3.6)
LYMPHOCYTES NFR BLD: 31 % (ref 21–52)
MCH RBC QN AUTO: 28.5 PG (ref 24–34)
MCHC RBC AUTO-ENTMCNC: 33.6 G/DL (ref 31–37)
MCV RBC AUTO: 84.8 FL (ref 78–100)
MONOCYTES # BLD: 0.8 K/UL (ref 0.05–1.2)
MONOCYTES NFR BLD: 8 % (ref 3–10)
NEUTS SEG # BLD: 5.7 K/UL (ref 1.8–8)
NEUTS SEG NFR BLD: 56 % (ref 40–73)
NITRITE UR QL STRIP.AUTO: NEGATIVE
NRBC # BLD: 0 K/UL (ref 0–0.01)
NRBC BLD-RTO: 0 PER 100 WBC
PH UR STRIP: 5.5 [PH] (ref 5–8)
PLATELET # BLD AUTO: 185 K/UL (ref 135–420)
PMV BLD AUTO: 10.5 FL (ref 9.2–11.8)
POTASSIUM SERPL-SCNC: 4.1 MMOL/L (ref 3.5–5.5)
PROT SERPL-MCNC: 7.9 G/DL (ref 6.4–8.2)
PROT UR STRIP-MCNC: NEGATIVE MG/DL
RBC # BLD AUTO: 4.67 M/UL (ref 4.35–5.65)
SODIUM SERPL-SCNC: 140 MMOL/L (ref 136–145)
SP GR UR REFRACTOMETRY: 1.02 (ref 1–1.03)
UROBILINOGEN UR QL STRIP.AUTO: 1 EU/DL (ref 0.2–1)
WBC # BLD AUTO: 10.2 K/UL (ref 4.6–13.2)

## 2022-10-12 PROCEDURE — 96374 THER/PROPH/DIAG INJ IV PUSH: CPT

## 2022-10-12 PROCEDURE — 99284 EMERGENCY DEPT VISIT MOD MDM: CPT

## 2022-10-12 PROCEDURE — 74011250636 HC RX REV CODE- 250/636: Performed by: PHYSICIAN ASSISTANT

## 2022-10-12 PROCEDURE — 85025 COMPLETE CBC W/AUTO DIFF WBC: CPT

## 2022-10-12 PROCEDURE — 80053 COMPREHEN METABOLIC PANEL: CPT

## 2022-10-12 PROCEDURE — 81003 URINALYSIS AUTO W/O SCOPE: CPT

## 2022-10-12 PROCEDURE — 74176 CT ABD & PELVIS W/O CONTRAST: CPT

## 2022-10-12 RX ORDER — CYCLOBENZAPRINE HCL 10 MG
10 TABLET ORAL
Qty: 30 TABLET | Refills: 0 | Status: SHIPPED | OUTPATIENT
Start: 2022-10-12

## 2022-10-12 RX ORDER — KETOROLAC TROMETHAMINE 15 MG/ML
15 INJECTION, SOLUTION INTRAMUSCULAR; INTRAVENOUS
Status: COMPLETED | OUTPATIENT
Start: 2022-10-12 | End: 2022-10-12

## 2022-10-12 RX ORDER — KETOROLAC TROMETHAMINE 10 MG/1
10 TABLET, FILM COATED ORAL
Qty: 20 TABLET | Refills: 0 | Status: SHIPPED | OUTPATIENT
Start: 2022-10-12

## 2022-10-12 RX ORDER — ACETAMINOPHEN 325 MG/1
650 TABLET ORAL
Qty: 20 TABLET | Refills: 0 | Status: SHIPPED | OUTPATIENT
Start: 2022-10-12

## 2022-10-12 RX ADMIN — KETOROLAC TROMETHAMINE 15 MG: 15 INJECTION, SOLUTION INTRAMUSCULAR; INTRAVENOUS at 15:12

## 2022-10-12 NOTE — ED PROVIDER NOTES
EMERGENCY DEPARTMENT HISTORY AND PHYSICAL EXAM    Date: 10/12/2022  Patient Name: Leon Escalera. History of Presenting Illness     Chief Complaint   Patient presents with    Flank Pain         History Provided By: Patient    Chief Complaint: Left flank pain and urinary frequency/urgency  Duration: Couple days  Timing: Gradual  Location: Left flank  Quality: Aching  Severity: Moderate  Modifying Factors: Worse when walking back and forth to the bathroom  Associated Symptoms: none       Additional History (Context): Leon Matos is a 57 y.o. male with a history of hypertension and chronic back pain who presents today for issues listed above. Patient reports he is unsure if this is an exacerbation of his normal pain or not. Denies any recent trauma or injury. Reports left flank pain and urinary frequency/urgency. Denies any associated nausea, vomiting, diarrhea, constipation, fevers or chills. Reports that his last prostate testing was normal.  Denies known history of BPH. Has not tried anything for this at home. Reports pain is worse when moving. Denies any loss of bowel or bladder, history of IV drug abuse or cancer. Denies known history of kidney stones. PCP: Oral Dolan MD    Current Outpatient Medications   Medication Sig Dispense Refill    ketorolac (TORADOL) 10 mg tablet Take 1 Tablet by mouth every six (6) hours as needed for Pain. 20 Tablet 0    acetaminophen (TYLENOL) 325 mg tablet Take 2 Tablets by mouth every four (4) hours as needed for Pain. 20 Tablet 0    cyclobenzaprine (FLEXERIL) 10 mg tablet Take 1 Tablet by mouth three (3) times daily as needed for Muscle Spasm(s). 30 Tablet 0    omeprazole (PRILOSEC) 40 mg capsule Take 1 Capsule by mouth two (2) times a day. 60 Capsule 2    albuterol (ProAir HFA) 90 mcg/actuation inhaler Take 2 Puffs by inhalation every four (4) hours as needed for Wheezing.  1 Each 0    acetaminophen (TYLENOL) 325 mg tablet Take 2 Tablets by mouth every six (6) hours as needed for Pain. 20 Tablet 0    Accu-Chek Jolynn Control Soln soln       Accu-Chek Jolynn Plus test strp strip       ketoconazole (NIZORAL) 2 % shampoo       triamcinolone acetonide (KENALOG) 0.1 % ointment       hydroCHLOROthiazide 12.5 mg cap 12.5 mg, lisinopriL 20 mg tab 20 mg Take 1 Tablet by mouth daily. atorvastatin (LIPITOR) 10 mg tablet Take 10 mg by mouth daily. cyclobenzaprine (FLEXERIL) 10 mg tablet Take 10 mg by mouth three (3) times daily as needed for Muscle Spasm(s). fluticasone propionate (FLONASE NA) 2 Sprays by Both Nostrils route daily. amLODIPine (NORVASC) 5 mg tablet Take 5 mg by mouth daily. ibuprofen (MOTRIN) 600 mg tablet Take 1 Tablet by mouth every six (6) hours as needed for Pain. (Patient not taking: Reported on 10/12/2022) 20 Tablet 0    levoFLOXacin (LEVAQUIN) 500 mg tablet Take 1.5 Tablets by mouth daily. Take 1-2 hour prior to procedure (Patient not taking: Reported on 10/12/2022) 3 Tablet 0    ID NOW COVID-19 Test Kit kit TEST AS DIRECTED TODAY      aspirin delayed-release 81 mg tablet Take 81 mg by mouth daily.  (Patient not taking: Reported on 10/12/2022)         Past History     Past Medical History:  Past Medical History:   Diagnosis Date    Chronic pain 2021    RT/LT leg    Diabetes mellitus, new onset ([facility] [address] 11/11/2019    no meds now    Hypertension     Liver disease     Hepatitis C- was tx    Low back pain     Lumbar stenosis     L3-L4, L4-L5    Migraine headache     Numbness and tingling in left hand     Radiculopathy     Right L4-L5    Right leg pain     Sensation of cold in leg     Radiating into right posterior buttock and anterior thigh    Unsteady gait        Past Surgical History:  Past Surgical History:   Procedure Laterality Date    COLONOSCOPY N/A 11/6/2018    COLONOSCOPY, SCREENING /c Bx Polypectomy /c Hot Snared Polypectomy performed by Teofilo Flores MD at [address] [place] [address] N/A 11/9/2018 SIGMOIDOSCOPY FLEXIBLE: ENDO CLIP APPLICATION performed by Darshan Lindsey MD at [facility]    HX CERVICAL FUSION  2016    ACDF C3/4 C4/5 C5/6 C6/7, Dr. Broderick Dorsey  14    hematoma removal from cervical spine       Family History:  Family History   Problem Relation Age of Onset    Hypertension Mother     Heart Disease Mother     Diabetes Mother     Hypertension Father        Social History:  Social History     Tobacco Use    Smoking status: Former     Packs/day: 0.50     Years: 40.00     Pack years: 20.00     Types: Cigarettes, Pipe     Quit date: 2016     Years since quittin.4    Smokeless tobacco: Never   Vaping Use    Vaping Use: Never used   Substance Use Topics    Alcohol use: Yes     Alcohol/week: 4.0 standard drinks     Types: 4 Glasses of wine per week     Comment: c    Drug use: Not Currently     Types: Marijuana, Cocaine     Comment: states 20-30 years ago       Allergies: Allergies   Allergen Reactions    Cymbalta [Duloxetine] Itching    Percocet [Oxycodone-Acetaminophen] Itching     Denies    Tomato Itching         Review of Systems   Review of Systems   Constitutional:  Negative for chills and fever. HENT:  Negative for congestion, rhinorrhea and sore throat. Respiratory:  Negative for cough and shortness of breath. Cardiovascular:  Negative for chest pain. Gastrointestinal:  Negative for abdominal pain, blood in stool, constipation, diarrhea, nausea and vomiting. Genitourinary:  Positive for flank pain, frequency and urgency. Negative for dysuria and hematuria. Musculoskeletal:  Negative for back pain and myalgias. Skin:  Negative for rash and wound. Neurological:  Negative for dizziness and headaches. All other systems reviewed and are negative.   All Other Systems Negative  Physical Exam     Vitals:    10/12/22 1448   BP: (!) 135/97   Pulse: 87   Resp: 16   Temp: 98.8 °F (37.1 °C)   SpO2: 99%   Weight: 77.6 kg (171 lb) Height: 5' 6\" (1.676 m)     Physical Exam  Vitals and nursing note reviewed. Constitutional:       General: He is not in acute distress. Appearance: He is well-developed. He is not diaphoretic. HENT:      Head: Normocephalic and atraumatic. Eyes:      Conjunctiva/sclera: Conjunctivae normal.   Cardiovascular:      Rate and Rhythm: Normal rate and regular rhythm. Heart sounds: Normal heart sounds. Pulmonary:      Effort: Pulmonary effort is normal. No respiratory distress. Breath sounds: Normal breath sounds. Chest:      Chest wall: No tenderness. Abdominal:      General: Bowel sounds are normal. There is no distension. Palpations: Abdomen is soft. Tenderness: There is no abdominal tenderness. There is left CVA tenderness. There is no guarding or rebound. Musculoskeletal:         General: No deformity. Normal range of motion. Cervical back: Normal, normal range of motion and neck supple. Thoracic back: Normal.      Lumbar back: Normal.   Skin:     General: Skin is warm and dry. Neurological:      Mental Status: He is alert and oriented to person, place, and time. Diagnostic Study Results     Labs -     Recent Results (from the past 12 hour(s))   CBC WITH AUTOMATED DIFF    Collection Time: 10/12/22  3:08 PM   Result Value Ref Range    WBC 10.2 4.6 - 13.2 K/uL    RBC 4.67 4.35 - 5.65 M/uL    HGB 13.3 13.0 - 16.0 g/dL    HCT 39.6 36.0 - 48.0 %    MCV 84.8 78.0 - 100.0 FL    MCH 28.5 24.0 - 34.0 PG    MCHC 33.6 31.0 - 37.0 g/dL    RDW 14.6 (H) 11.6 - 14.5 %    PLATELET 945 817 - 696 K/uL    MPV 10.5 9.2 - 11.8 FL    NRBC 0.0 0  WBC    ABSOLUTE NRBC 0.00 0.00 - 0.01 K/uL    NEUTROPHILS 56 40 - 73 %    LYMPHOCYTES 31 21 - 52 %    MONOCYTES 8 3 - 10 %    EOSINOPHILS 4 0 - 5 %    BASOPHILS 1 0 - 2 %    IMMATURE GRANULOCYTES 0 0.0 - 0.5 %    ABS. NEUTROPHILS 5.7 1.8 - 8.0 K/UL    ABS. LYMPHOCYTES 3.1 0.9 - 3.6 K/UL    ABS. MONOCYTES 0.8 0.05 - 1.2 K/UL    ABS. EOSINOPHILS 0.5 (H) 0.0 - 0.4 K/UL    ABS. BASOPHILS 0.1 0.0 - 0.1 K/UL    ABS. IMM. GRANS. 0.0 0.00 - 0.04 K/UL    DF AUTOMATED     METABOLIC PANEL, COMPREHENSIVE    Collection Time: 10/12/22  3:08 PM   Result Value Ref Range    Sodium 140 136 - 145 mmol/L    Potassium 4.1 3.5 - 5.5 mmol/L    Chloride 106 100 - 111 mmol/L    CO2 28 21 - 32 mmol/L    Anion gap 6 3.0 - 18 mmol/L    Glucose 111 (H) 74 - 99 mg/dL    BUN 14 7.0 - 18 MG/DL    Creatinine 1.09 0.6 - 1.3 MG/DL    BUN/Creatinine ratio 13 12 - 20      eGFR >60 >60 ml/min/1.73m2    Calcium 9.0 8.5 - 10.1 MG/DL    Bilirubin, total 0.4 0.2 - 1.0 MG/DL    ALT (SGPT) 21 16 - 61 U/L    AST (SGOT) 20 10 - 38 U/L    Alk. phosphatase 75 45 - 117 U/L    Protein, total 7.9 6.4 - 8.2 g/dL    Albumin 4.3 3.4 - 5.0 g/dL    Globulin 3.6 2.0 - 4.0 g/dL    A-G Ratio 1.2 0.8 - 1.7     URINALYSIS W/ RFLX MICROSCOPIC    Collection Time: 10/12/22  3:51 PM   Result Value Ref Range    Color YELLOW      Appearance CLEAR      Specific gravity 1.018 1.005 - 1.030      pH (UA) 5.5 5.0 - 8.0      Protein Negative NEG mg/dL    Glucose Negative NEG mg/dL    Ketone Negative NEG mg/dL    Bilirubin Negative NEG      Blood Negative NEG      Urobilinogen 1.0 0.2 - 1.0 EU/dL    Nitrites Negative NEG      Leukocyte Esterase Negative NEG         Radiologic Studies -   CT ABD PELV WO CONT   Final Result      No acute findings. Stable advanced atherosclerosis. Stable circumferential esophageal wall thickening. Evidence of reflux. Stable prostatomegaly. Stable mild bladder wall thickening. Stable avascular necrosis of the femoral heads. Stable fat-containing inguinal hernias. Please see report for multiple additional findings and full details. .      Thank you for enabling us to participate in the care of this patient. CT Results  (Last 48 hours)                 10/12/22 1533  CT ABD PELV WO CONT Final result    Impression:      No acute findings. Stable advanced atherosclerosis. Stable circumferential esophageal wall thickening. Evidence of reflux. Stable prostatomegaly. Stable mild bladder wall thickening. Stable avascular necrosis of the femoral heads. Stable fat-containing inguinal hernias. Please see report for multiple additional findings and full details. .       Thank you for enabling us to participate in the care of this patient. Narrative:  CT ABDOMEN AND PELVIS WITHOUT CONTRAST       INDICATIONS: Left flank pain       TECHNIQUE:  No IV contrast. No oral contrast. Acquisition of 5 mm thickness   axial images through Abdomen and pelvis. Sagittal and coronal MPR generated. CT scans at this facility are performed using dose optimization technique as   appropriate to a performed exam, to include automated exposure control,   adjustment of the mA and/or kV according to patient size (including appropriate   matching for site specific examinations), or use of iterative reconstruction   technique. COMPARISON: 5/1/2020       FINDINGS:        Lung bases: No focal consolidation. Advanced atherosclerosis in the coronary   arteries. Circumferential distal esophageal thickening. Fluid in the distal   esophagus. No intraperitoneal free air. No free fluid or fluid collection. Liver:  Unremarkable. Gallbladder/Biliary: Unremarkable. Spleen: Unremarkable. Left Kidney: Unremarkable. Right Kidney: Unremarkable. Adrenal glands: Unremarkable. Pancreas: Unremarkable. Intestines and mesentery: Unremarkable. Lymph nodes: There is no adenopathy. Vascular: Stable advanced atherosclerosis, unchanged. Aortic ectasia unchanged. .       Bladder: Minimal bladder wall thickening, unchanged. .       Osseous structures: Osseous degenerative changes. Mild levoscoliosis. Loss of   lumbar lordosis. Bilateral avascular necrosis of the femoral heads. .       Stable prostatic enlargement. Seminal vesicles are prominent and asymmetric,   unchanged. Fat-containing inguinal hernias, unchanged. CXR Results  (Last 48 hours)      None              Medical Decision Making   I am the first provider for this patient. I reviewed the vital signs, available nursing notes, past medical history, past surgical history, family history and social history. Vital Signs-Reviewed the patient's vital signs. Records Reviewed: Nursing Notes and Old Medical Records     Procedures: None   Procedures    Provider Notes (Medical Decision Making):     Differential Diagnosis: Musculoskeletal pain, myofascial strain/sprain, muscle spasm, spondylolisthesis, spondylosis, DJD, OA, sciatica, cauda equina syndrome, uti, BPH, nephrolithiasis     Plan: Will order labs, ua and CT to evaluate for kidney stone. Will also order Toradol for pain     7:03 PM  Have discussed overall reassuring work-up with patient. Did discuss that CT findings were unchanged from prior. Will discharge home with pain medication muscle relaxants. Have encouraged patient to stretch and try hot baths with Epson salt. Have encouraged orthopedic spine and primary care follow-up. Patient states understanding. Will discharge home. MED RECONCILIATION:  No current facility-administered medications for this encounter. Current Outpatient Medications   Medication Sig    ketorolac (TORADOL) 10 mg tablet Take 1 Tablet by mouth every six (6) hours as needed for Pain. acetaminophen (TYLENOL) 325 mg tablet Take 2 Tablets by mouth every four (4) hours as needed for Pain. cyclobenzaprine (FLEXERIL) 10 mg tablet Take 1 Tablet by mouth three (3) times daily as needed for Muscle Spasm(s). omeprazole (PRILOSEC) 40 mg capsule Take 1 Capsule by mouth two (2) times a day. albuterol (ProAir HFA) 90 mcg/actuation inhaler Take 2 Puffs by inhalation every four (4) hours as needed for Wheezing. acetaminophen (TYLENOL) 325 mg tablet Take 2 Tablets by mouth every six (6) hours as needed for Pain. Accu-Chek Jolynn Control Soln soln     Accu-Chek Jolynn Plus test strp strip     ketoconazole (NIZORAL) 2 % shampoo     triamcinolone acetonide (KENALOG) 0.1 % ointment     hydroCHLOROthiazide 12.5 mg cap 12.5 mg, lisinopriL 20 mg tab 20 mg Take 1 Tablet by mouth daily. atorvastatin (LIPITOR) 10 mg tablet Take 10 mg by mouth daily. cyclobenzaprine (FLEXERIL) 10 mg tablet Take 10 mg by mouth three (3) times daily as needed for Muscle Spasm(s). fluticasone propionate (FLONASE NA) 2 Sprays by Both Nostrils route daily. amLODIPine (NORVASC) 5 mg tablet Take 5 mg by mouth daily. ibuprofen (MOTRIN) 600 mg tablet Take 1 Tablet by mouth every six (6) hours as needed for Pain. (Patient not taking: Reported on 10/12/2022)    levoFLOXacin (LEVAQUIN) 500 mg tablet Take 1.5 Tablets by mouth daily. Take 1-2 hour prior to procedure (Patient not taking: Reported on 10/12/2022)    ID NOW COVID-19 Test Kit kit TEST AS DIRECTED TODAY    aspirin delayed-release 81 mg tablet Take 81 mg by mouth daily. (Patient not taking: Reported on 10/12/2022)       Disposition:  Home     DISCHARGE NOTE:   Pt has been reexamined. Patient has no new complaints, changes, or physical findings. Care plan outlined and precautions discussed. Results of workup were reviewed with the patient. All medications were reviewed with the patient. All of pt's questions and concerns were addressed. Patient was instructed and agrees to follow up with PCP/ortho as well as to return to the ED upon further deterioration. Patient is ready to go home.     Follow-up Information       Follow up With Specialties Details Why Contact Info    [facility] EMERGENCY DEPT Emergency Medicine  As needed [address] Larissa Nugent MD Family Medicine Schedule an appointment as soon as possible for a visit   [address] [facility]  [phone]              Discharge Medication List as of 10/12/2022  5:03 PM        START taking these medications    Details   ketorolac (TORADOL) 10 mg tablet Take 1 Tablet by mouth every six (6) hours as needed for Pain., Normal, Disp-20 Tablet, R-0      !! cyclobenzaprine (FLEXERIL) 10 mg tablet Take 1 Tablet by mouth three (3) times daily as needed for Muscle Spasm(s). , Normal, Disp-30 Tablet, R-0       !! - Potential duplicate medications found. Please discuss with provider. CONTINUE these medications which have CHANGED    Details   !! acetaminophen (TYLENOL) 325 mg tablet Take 2 Tablets by mouth every four (4) hours as needed for Pain., Normal, Disp-20 Tablet, R-0       !! - Potential duplicate medications found. Please discuss with provider. CONTINUE these medications which have NOT CHANGED    Details   omeprazole (PRILOSEC) 40 mg capsule Take 1 Capsule by mouth two (2) times a day., Normal, Disp-60 Capsule, R-2      albuterol (ProAir HFA) 90 mcg/actuation inhaler Take 2 Puffs by inhalation every four (4) hours as needed for Wheezing., Normal, Disp-1 Each, R-0      !! acetaminophen (TYLENOL) 325 mg tablet Take 2 Tablets by mouth every six (6) hours as needed for Pain., Normal, Disp-20 Tablet, R-0      Accu-Chek Jolynn Control Soln soln Historical Med, GARY      Accu-Chek Jolynn Plus test strp strip Historical Med, GARY      ketoconazole (NIZORAL) 2 % shampoo Historical Med      triamcinolone acetonide (KENALOG) 0.1 % ointment Historical Med      hydroCHLOROthiazide 12.5 mg cap 12.5 mg, lisinopriL 20 mg tab 20 mg Take 1 Tablet by mouth daily. , Historical Med      atorvastatin (LIPITOR) 10 mg tablet Take 10 mg by mouth daily. , Historical Med      !! cyclobenzaprine (FLEXERIL) 10 mg tablet Take 10 mg by mouth three (3) times daily as needed for Muscle Spasm(s). , Historical Med      fluticasone propionate (FLONASE NA) 2 Sprays by Both Nostrils route daily. , Historical Med amLODIPine (NORVASC) 5 mg tablet Take 5 mg by mouth daily. , Historical Med      ibuprofen (MOTRIN) 600 mg tablet Take 1 Tablet by mouth every six (6) hours as needed for Pain., Normal, Disp-20 Tablet, R-0      levoFLOXacin (LEVAQUIN) 500 mg tablet Take 1.5 Tablets by mouth daily. Take 1-2 hour prior to procedure, Normal, Disp-3 Tablet, R-0      ID NOW COVID-19 Test Kit kit TEST AS DIRECTED TODAY, Historical Med, GARY      aspirin delayed-release 81 mg tablet Take 81 mg by mouth daily. , Historical Med       !! - Potential duplicate medications found. Please discuss with provider. STOP taking these medications       lisinopril (PRINIVIL, ZESTRIL) 20 mg tablet Comments:   Reason for Stopping:                   Diagnosis     Clinical Impression:   1. Left flank pain          \"Please note that this dictation was completed with XYZE, the computer voice recognition software. Quite often unanticipated grammatical, syntax, homophones, and other interpretive errors are inadvertently transcribed by the computer software. Please disregard these errors. Please excuse any errors that have escaped final proofreading. \"

## 2022-10-12 NOTE — ED NOTES
I have reviewed discharge instructions with the patient. The patient verbalized understanding. Current Discharge Medication List        START taking these medications    Details   ketorolac (TORADOL) 10 mg tablet Take 1 Tablet by mouth every six (6) hours as needed for Pain. Qty: 20 Tablet, Refills: 0  Start date: 10/12/2022      !! acetaminophen (TYLENOL) 325 mg tablet Take 2 Tablets by mouth every four (4) hours as needed for Pain. Qty: 20 Tablet, Refills: 0  Start date: 10/12/2022      !! cyclobenzaprine (FLEXERIL) 10 mg tablet Take 1 Tablet by mouth three (3) times daily as needed for Muscle Spasm(s). Qty: 30 Tablet, Refills: 0  Start date: 10/12/2022       !! - Potential duplicate medications found. Please discuss with provider. CONTINUE these medications which have NOT CHANGED    Details   !! acetaminophen (TYLENOL) 325 mg tablet Take 2 Tablets by mouth every six (6) hours as needed for Pain. Qty: 20 Tablet, Refills: 0      !! cyclobenzaprine (FLEXERIL) 10 mg tablet Take 10 mg by mouth three (3) times daily as needed for Muscle Spasm(s). !! - Potential duplicate medications found. Please discuss with provider.

## 2022-10-12 NOTE — ED TRIAGE NOTES
C/o left flank pain for several days. Pt denies blood in urine. Also denies nausea, vomiting or diarrhea.

## 2022-10-31 ENCOUNTER — APPOINTMENT (OUTPATIENT)
Dept: PHYSICAL THERAPY | Age: 62
End: 2022-10-31

## 2023-01-04 ENCOUNTER — HOSPITAL ENCOUNTER (OUTPATIENT)
Dept: LAB | Age: 63
Discharge: HOME | End: 2023-01-04
Payer: MEDICARE

## 2023-01-04 DIAGNOSIS — R97.20 ELEVATED PSA: ICD-10-CM

## 2023-01-04 LAB — PSA SERPL-MCNC: 10.7 NG/ML (ref 0–4)

## 2023-01-04 PROCEDURE — 84153 ASSAY OF PSA TOTAL: CPT

## 2023-01-04 PROCEDURE — 36415 COLL VENOUS BLD VENIPUNCTURE: CPT

## 2023-02-09 ENCOUNTER — HOSPITAL ENCOUNTER (OUTPATIENT)
Dept: LAB | Age: 63
Discharge: HOME | End: 2023-02-09
Payer: MEDICARE

## 2023-02-09 ENCOUNTER — TRANSCRIBE ORDER (OUTPATIENT)
Dept: REGISTRATION | Age: 63
End: 2023-02-09

## 2023-02-09 DIAGNOSIS — I10 ESSENTIAL HYPERTENSION, MALIGNANT: ICD-10-CM

## 2023-02-09 DIAGNOSIS — I10 ESSENTIAL HYPERTENSION, MALIGNANT: Primary | ICD-10-CM

## 2023-02-09 DIAGNOSIS — N40.0 BEP (BENIGN ENLARGEMENT OF PROSTATE): ICD-10-CM

## 2023-02-09 LAB
ALBUMIN SERPL-MCNC: 4 G/DL (ref 3.4–5)
ALBUMIN/GLOB SERPL: 1.1 (ref 0.8–1.7)
ALP SERPL-CCNC: 73 U/L (ref 45–117)
ALT SERPL-CCNC: 19 U/L (ref 16–61)
ANION GAP SERPL CALC-SCNC: 6 MMOL/L (ref 3–18)
AST SERPL-CCNC: 19 U/L (ref 10–38)
BASOPHILS # BLD: 0.1 K/UL (ref 0–0.1)
BASOPHILS NFR BLD: 1 % (ref 0–2)
BILIRUB SERPL-MCNC: 0.7 MG/DL (ref 0.2–1)
BUN SERPL-MCNC: 10 MG/DL (ref 7–18)
BUN/CREAT SERPL: 10 (ref 12–20)
CALCIUM SERPL-MCNC: 9.4 MG/DL (ref 8.5–10.1)
CHLORIDE SERPL-SCNC: 102 MMOL/L (ref 100–111)
CHOLEST SERPL-MCNC: 120 MG/DL
CO2 SERPL-SCNC: 30 MMOL/L (ref 21–32)
CREAT SERPL-MCNC: 1.01 MG/DL (ref 0.6–1.3)
DIFFERENTIAL METHOD BLD: ABNORMAL
EOSINOPHIL # BLD: 0.4 K/UL (ref 0–0.4)
EOSINOPHIL NFR BLD: 5 % (ref 0–5)
ERYTHROCYTE [DISTWIDTH] IN BLOOD BY AUTOMATED COUNT: 13.1 % (ref 11.6–14.5)
GLOBULIN SER CALC-MCNC: 3.8 G/DL (ref 2–4)
GLUCOSE SERPL-MCNC: 146 MG/DL (ref 74–99)
HCT VFR BLD AUTO: 39.7 % (ref 36–48)
HDLC SERPL-MCNC: 52 MG/DL (ref 40–60)
HDLC SERPL: 2.3 (ref 0–5)
HGB BLD-MCNC: 13.3 G/DL (ref 13–16)
IMM GRANULOCYTES # BLD AUTO: 0 K/UL (ref 0–0.04)
IMM GRANULOCYTES NFR BLD AUTO: 1 % (ref 0–0.5)
LDLC SERPL CALC-MCNC: 56.2 MG/DL (ref 0–100)
LIPID PROFILE,FLP: NORMAL
LYMPHOCYTES # BLD: 2.4 K/UL (ref 0.9–3.6)
LYMPHOCYTES NFR BLD: 34 % (ref 21–52)
MCH RBC QN AUTO: 28.9 PG (ref 24–34)
MCHC RBC AUTO-ENTMCNC: 33.5 G/DL (ref 31–37)
MCV RBC AUTO: 86.1 FL (ref 78–100)
MONOCYTES # BLD: 0.5 K/UL (ref 0.05–1.2)
MONOCYTES NFR BLD: 8 % (ref 3–10)
NEUTS SEG # BLD: 3.6 K/UL (ref 1.8–8)
NEUTS SEG NFR BLD: 51 % (ref 40–73)
NRBC # BLD: 0 K/UL (ref 0–0.01)
NRBC BLD-RTO: 0 PER 100 WBC
PLATELET # BLD AUTO: 227 K/UL (ref 135–420)
PMV BLD AUTO: 10.2 FL (ref 9.2–11.8)
POTASSIUM SERPL-SCNC: 4.3 MMOL/L (ref 3.5–5.5)
PROT SERPL-MCNC: 7.8 G/DL (ref 6.4–8.2)
PSA SERPL-MCNC: 11.4 NG/ML (ref 0–4)
RBC # BLD AUTO: 4.61 M/UL (ref 4.35–5.65)
SODIUM SERPL-SCNC: 138 MMOL/L (ref 136–145)
T4 FREE SERPL-MCNC: 1.1 NG/DL (ref 0.7–1.5)
TRIGL SERPL-MCNC: 59 MG/DL (ref ?–150)
TSH SERPL DL<=0.05 MIU/L-ACNC: 1.31 UIU/ML (ref 0.36–3.74)
VLDLC SERPL CALC-MCNC: 11.8 MG/DL
WBC # BLD AUTO: 6.9 K/UL (ref 4.6–13.2)

## 2023-02-09 PROCEDURE — 84153 ASSAY OF PSA TOTAL: CPT

## 2023-02-09 PROCEDURE — 36415 COLL VENOUS BLD VENIPUNCTURE: CPT

## 2023-02-09 PROCEDURE — 84439 ASSAY OF FREE THYROXINE: CPT

## 2023-02-09 PROCEDURE — 80061 LIPID PANEL: CPT

## 2023-02-09 PROCEDURE — 85025 COMPLETE CBC W/AUTO DIFF WBC: CPT

## 2023-02-09 PROCEDURE — 84443 ASSAY THYROID STIM HORMONE: CPT

## 2023-02-09 PROCEDURE — 80053 COMPREHEN METABOLIC PANEL: CPT

## 2023-03-01 ENCOUNTER — HOSPITAL ENCOUNTER (OUTPATIENT)
Facility: HOSPITAL | Age: 63
Discharge: HOME | End: 2023-03-04
Payer: MEDICARE

## 2023-03-01 DIAGNOSIS — R10.31 RIGHT GROIN PAIN: ICD-10-CM

## 2023-03-01 LAB — CREAT UR-MCNC: 1 MG/DL (ref 0.6–1.3)

## 2023-03-01 PROCEDURE — 82565 ASSAY OF CREATININE: CPT

## 2023-03-01 PROCEDURE — 74174 CTA ABD&PLVS W/CONTRAST: CPT

## 2023-03-01 PROCEDURE — 6360000004 HC RX CONTRAST MEDICATION: Performed by: SURGERY

## 2023-03-01 RX ADMIN — IOPAMIDOL 100 ML: 755 INJECTION, SOLUTION INTRAVENOUS at 09:44

## 2023-03-06 ENCOUNTER — APPOINTMENT (OUTPATIENT)
Facility: HOSPITAL | Age: 63
End: 2023-03-06
Payer: MEDICARE

## 2023-03-06 ENCOUNTER — HOSPITAL ENCOUNTER (EMERGENCY)
Facility: HOSPITAL | Age: 63
Discharge: HOME | End: 2023-03-06
Attending: EMERGENCY MEDICINE
Payer: MEDICARE

## 2023-03-06 VITALS
TEMPERATURE: 98.2 F | OXYGEN SATURATION: 99 % | RESPIRATION RATE: 18 BRPM | WEIGHT: 169 LBS | HEART RATE: 81 BPM | DIASTOLIC BLOOD PRESSURE: 82 MMHG | HEIGHT: 66 IN | BODY MASS INDEX: 27.16 KG/M2 | SYSTOLIC BLOOD PRESSURE: 122 MMHG

## 2023-03-06 DIAGNOSIS — S86.011A STRAIN OF ACHILLES TENDON, RIGHT, INITIAL ENCOUNTER: Primary | ICD-10-CM

## 2023-03-06 DIAGNOSIS — S63.502A SPRAIN OF LEFT WRIST, INITIAL ENCOUNTER: ICD-10-CM

## 2023-03-06 PROCEDURE — 99283 EMERGENCY DEPT VISIT LOW MDM: CPT

## 2023-03-06 PROCEDURE — 73100 X-RAY EXAM OF WRIST: CPT

## 2023-03-06 PROCEDURE — 73610 X-RAY EXAM OF ANKLE: CPT

## 2023-03-06 ASSESSMENT — ENCOUNTER SYMPTOMS
NAUSEA: 1
COUGH: 0
ABDOMINAL PAIN: 0
DIARRHEA: 0
SHORTNESS OF BREATH: 0
VOMITING: 0
CHEST TIGHTNESS: 0

## 2023-03-06 ASSESSMENT — PAIN - FUNCTIONAL ASSESSMENT: PAIN_FUNCTIONAL_ASSESSMENT: NONE - DENIES PAIN

## 2023-03-06 NOTE — ED TRIAGE NOTES
Pt reports \"I was started on Prilosec and I did some research and I think I have a fractured wrist and ankle from the medication. \". Started on medication last year. Pt c/o of nausea with left wrist and right ankle pain. Denies any other trauma to either, ambulatory to pivot. States s/s started a couple months ago.

## 2023-03-07 NOTE — ED PROVIDER NOTES
EMERGENCY DEPARTMENT HISTORY AND PHYSICAL EXAM        Date: 3/6/2023  Patient Name: Desiree Mcallister. History of Presenting Illness     Chief Complaint   Patient presents with    Wrist Pain    Ankle Pain       History Provided By: History obtained from patient    HPI: Desiree Mcallister., 57 y.o. male PMHx significant for HTN, diabetes, right leg femoral artery occlusion presents by personal vehicle to the ED with cc of pain in right ankle and left wrist.  Right ankle pain is located and Achilles tendon, onset yesterday evening, no known stressors or aggravating movements during the day. Pain does not radiate, aggravated by walking relieved with rest severity is 8 out of 10, patient walks with a limp. He has tried ibuprofen and Tylenol without relief. Patient denies numbness or tingling in the foot, no cool sensation. Left wrist pain has been present for 2 weeks, patient was pressing up in the bed with wrist in full extension when weight shifted and wrist went into full flexion. Pain is located at the distal ulna. Severity of pain is 7 out of 10. Pain does not radiate, patient denies numbness or tingling in the hand, no cool feeling. Patient endorses nausea, states that he has been prescribed Prilosec but has not taken it for the last 7 days. Patient endorses no other signs of systemic illness to include fever, chill, chest pain, shortness of breath. There are no other complaints, changes, or physical findings at this time. PCP: Anam Cleveland MD    No current facility-administered medications on file prior to encounter.      Current Outpatient Medications on File Prior to Encounter   Medication Sig Dispense Refill    acetaminophen (TYLENOL) 325 MG tablet Take 650 mg by mouth every 4 hours as needed      albuterol sulfate HFA (PROVENTIL;VENTOLIN;PROAIR) 108 (90 Base) MCG/ACT inhaler Inhale 2 puffs into the lungs every 4 hours as needed      amLODIPine (NORVASC) 5 MG tablet Take 5 mg by mouth daily      aspirin 81 MG EC tablet Take 81 mg by mouth daily      atorvastatin (LIPITOR) 10 MG tablet Take 10 mg by mouth daily      cyclobenzaprine (FLEXERIL) 10 MG tablet Take 10 mg by mouth 3 times daily as needed      ibuprofen (ADVIL;MOTRIN) 600 MG tablet Take 600 mg by mouth every 6 hours as needed      ketoconazole (NIZORAL) 2 % shampoo ceived the following from [facility]: Outside name: ketoconazole (NIZORAL) 2 % shampoo      ketorolac (TORADOL) 10 MG tablet Take 10 mg by mouth every 6 hours as needed      levoFLOXacin (LEVAQUIN) 500 MG tablet Take 750 mg by mouth daily      omeprazole (PRILOSEC) 40 MG delayed release capsule Take 40 mg by mouth 2 times daily      triamcinolone (KENALOG) 0.1 % ointment ceived the following from [facility]: Outside name: triamcinolone acetonide (KENALOG) 0.1 % ointment           Past History     Past Medical History:  Past Medical History:   Diagnosis Date    Chronic pain 2021    RT/LT leg    Diabetes mellitus, new onset ([facility] [address] 11/11/2019    no meds now    Hypertension     Liver disease     Hepatitis C- was tx    Low back pain     Lumbar stenosis     L3-L4, L4-L5    Migraine headache     Numbness and tingling in left hand     Radiculopathy     Right L4-L5    Right leg pain     Sensation of cold in leg     Radiating into right posterior buttock and anterior thigh    Unsteady gait        Past Surgical History:  Past Surgical History:   Procedure Laterality Date    CERVICAL FUSION  05/23/2016    ACDF C3/4 C4/5 C5/6 C6/7, Dr. Yeesnia Herbert    COLONOSCOPY N/A 11/6/2018    COLONOSCOPY, SCREENING /c Bx Polypectomy /c Hot Snared Polypectomy performed by Rosa Contreras MD at [address] 11/9/2018    SIGMOIDOSCOPY FLEXIBLE: ENDO CLIP APPLICATION performed by Rosa Contreras MD at [address] [facility]  05/25/16    hematoma removal from cervical spine       Family History:  Family History   Problem Relation Age of Onset    Hypertension Mother     Heart Disease Mother     Diabetes Mother     Hypertension Father        Social History:  Social History     Tobacco Use    Smoking status: Former     Packs/day: 0.50     Types: Cigarettes     Quit date: 2016     Years since quittin.8    Smokeless tobacco: Never   Substance Use Topics    Alcohol use: Yes     Alcohol/week: 4.0 standard drinks    Drug use: Not Currently     Types: Cocaine, Marijuana Juanetta Chase)       Allergies: Allergies   Allergen Reactions    Duloxetine Itching    Oxycodone-Acetaminophen Itching     Denies    Tomato Itching         Review of Systems   Review of Systems   Constitutional:  Negative for appetite change, fatigue and fever. Respiratory:  Negative for cough, chest tightness and shortness of breath. Cardiovascular:  Negative for chest pain, palpitations and leg swelling. Gastrointestinal:  Positive for nausea. Negative for abdominal pain, diarrhea and vomiting. Genitourinary:  Negative for difficulty urinating, dysuria, flank pain and frequency. Musculoskeletal:  Positive for arthralgias. Skin:  Negative for rash. Neurological:  Negative for dizziness, facial asymmetry, weakness, light-headedness, numbness and headaches. Hematological:  Negative for adenopathy. Does not bruise/bleed easily. Psychiatric/Behavioral:  Negative for agitation, behavioral problems and confusion. Physical Exam   Physical Exam  Vitals and nursing note reviewed. Constitutional:       General: He is not in acute distress. Appearance: Normal appearance. He is not ill-appearing, toxic-appearing or diaphoretic. HENT:      Head: Normocephalic and atraumatic. Cardiovascular:      Rate and Rhythm: Normal rate and regular rhythm. Pulmonary:      Effort: Pulmonary effort is normal.      Breath sounds: Normal breath sounds. Musculoskeletal:         General: Tenderness present. No swelling or deformity. Normal range of motion. Cervical back: Normal range of motion and neck supple. Right lower leg: No edema. Left lower leg: No edema. Comments: Tenderness to palpation right Achilles tendon near insertion at calcaneus. Patient is able to plantarflex with strength 4 out of 5. Calf squeeze results in plantarflexion. Dorsalis pedis palpable in right foot. Sensation intact. Capillary refill intact. Left wrist pain located at the distal ulna. Wrist range of motion not affected. Strength in wrist and fingers is 5 out of 5. No deformity, contusion, injury present. Capillary refill and sensation are intact in hand. Skin:     General: Skin is warm. Findings: No rash. Neurological:      General: No focal deficit present. Mental Status: He is alert and oriented to person, place, and time. Cranial Nerves: No cranial nerve deficit. Sensory: No sensory deficit. Motor: No weakness. Diagnostic Study Results     Labs -   No results found for this or any previous visit (from the past 12 hour(s)). Radiologic Studies -   XR WRIST LEFT (2 VIEWS)   Final Result   1. No acute osseous abnormality detected. 2.  Chronic and degenerative findings as above. 3.  Atherosclerosis. XR ANKLE RIGHT (MIN 3 VIEWS)   Final Result   1. No acute osseous abnormality detected. 2.  Distal Achilles calcification, which may reflect sequela prior degeneration   or injury. 3.  Atherosclerosis. Medical Decision Making   I am the first provider for this patient. I reviewed the vital signs, available nursing notes, past medical history, past surgical history, family history and social history. Vital Signs-Reviewed the patient's vital signs.   Vitals:    03/06/23 1317   BP: 122/82   Pulse: 81   Resp: 18   Temp: 98.2 °F (36.8 °C)   SpO2: 99%           Records Reviewed: Previous hospital visits    Provider Notes (Medical Decision Making):   Christopher Zelaya., 57 y.o. male PMHx significant for HTN, diabetes, right leg femoral artery occlusion presents by personal vehicle to the ED with cc of pain in right ankle and left wrist.        Left wrist sprain: Concern for fracture, x-ray negative for fracture. Distal neurovascular status intact. Clear history with trauma 2 weeks ago makes sprain most likely diagnosis. Ortho follow-up provided if signs and symptoms continue. Diclofenac provided outpatient prescription. Right achilles strain: Concern for fracture, x-ray negative for fracture. Distal neurovascular status intact. Patient also evaluated by ALVINA Ramírez who locates dorsalis pedis pulse. No clear history of trauma is concerning for DVT but patient has no recent immobilizations, surgery, cancer, no DVT history, no cough, normal heart rate, no fever. Ortho follow-up provided if signs and symptoms continue. Diclofenac provided outpatient prescription. Nausea: Likely secondary to dyspepsia given onset correlation with cessation of PPI. Other infectious etiologies possible, but patient agrees to trial of PPI before pursuing additional work-up. ED Course:        1. Strain of Achilles tendon, right, initial encounter    2.  Sprain of left wrist, initial encounter        Orders Placed This Encounter   Medications    diclofenac sodium (VOLTAREN) 1 % GEL     Sig: Apply 2 g topically 2 times daily for 14 days     Dispense:  50 g     Refill:  0          Medication List        START taking these medications      diclofenac sodium 1 % Gel  Commonly known as: VOLTAREN  Apply 2 g topically 2 times daily for 14 days            ASK your doctor about these medications      acetaminophen 325 MG tablet  Commonly known as: TYLENOL     albuterol sulfate  (90 Base) MCG/ACT inhaler  Commonly known as: PROVENTIL;VENTOLIN;PROAIR     amLODIPine 5 MG tablet  Commonly known as: NORVASC     aspirin 81 MG EC tablet     atorvastatin 10 MG tablet  Commonly known as: LIPITOR     cyclobenzaprine 10 MG tablet  Commonly known as: FLEXERIL     ibuprofen 600 MG tablet  Commonly known as: ADVIL;MOTRIN     ketoconazole 2 % shampoo  Commonly known as: NIZORAL     ketorolac 10 MG tablet  Commonly known as: TORADOL     levoFLOXacin 500 MG tablet  Commonly known as: LEVAQUIN     omeprazole 40 MG delayed release capsule  Commonly known as: PRILOSEC     triamcinolone 0.1 % ointment  Commonly known as: KENALOG               Where to Get Your Medications        These medications were sent to [facility] [address], [address] [place] [address]      Phone: [phone]   diclofenac sodium 1 % Gel         No follow-ups on file.           Ander Hoffmann PA-C  03/06/23 2028

## 2023-05-30 ENCOUNTER — HOSPITAL ENCOUNTER (OUTPATIENT)
Facility: HOSPITAL | Age: 63
Discharge: HOME | End: 2023-06-02
Payer: MEDICARE

## 2023-05-30 DIAGNOSIS — R97.20 ELEVATED PROSTATE SPECIFIC ANTIGEN (PSA): ICD-10-CM

## 2023-05-30 LAB
ANION GAP SERPL CALC-SCNC: 7 MMOL/L (ref 3–18)
APPEARANCE UR: CLEAR
BACTERIA URNS QL MICRO: ABNORMAL /HPF
BILIRUB UR QL: NEGATIVE
BUN SERPL-MCNC: 8 MG/DL (ref 7–18)
BUN/CREAT SERPL: 9 (ref 12–20)
CALCIUM SERPL-MCNC: 9 MG/DL (ref 8.5–10.1)
CHLORIDE SERPL-SCNC: 102 MMOL/L (ref 100–111)
CO2 SERPL-SCNC: 29 MMOL/L (ref 21–32)
COLOR UR: YELLOW
CREAT SERPL-MCNC: 0.92 MG/DL (ref 0.6–1.3)
EPITH CASTS URNS QL MICRO: ABNORMAL /LPF (ref 0–5)
ERYTHROCYTE [DISTWIDTH] IN BLOOD BY AUTOMATED COUNT: 13.6 % (ref 11.6–14.5)
GLUCOSE SERPL-MCNC: 125 MG/DL (ref 74–99)
GLUCOSE UR STRIP.AUTO-MCNC: NEGATIVE MG/DL
HCT VFR BLD AUTO: 39 % (ref 36–48)
HGB BLD-MCNC: 13.1 G/DL (ref 13–16)
HGB UR QL STRIP: NEGATIVE
KETONES UR QL STRIP.AUTO: NEGATIVE MG/DL
LEUKOCYTE ESTERASE UR QL STRIP.AUTO: NEGATIVE
MCH RBC QN AUTO: 29.2 PG (ref 24–34)
MCHC RBC AUTO-ENTMCNC: 33.6 G/DL (ref 31–37)
MCV RBC AUTO: 87.1 FL (ref 78–100)
NITRITE UR QL STRIP.AUTO: NEGATIVE
NRBC # BLD: 0 K/UL (ref 0–0.01)
NRBC BLD-RTO: 0 PER 100 WBC
PH UR STRIP: 6 (ref 5–8)
PLATELET # BLD AUTO: 197 K/UL (ref 135–420)
PMV BLD AUTO: 10.6 FL (ref 9.2–11.8)
POTASSIUM SERPL-SCNC: 3.6 MMOL/L (ref 3.5–5.5)
PROT UR STRIP-MCNC: NEGATIVE MG/DL
RBC # BLD AUTO: 4.48 M/UL (ref 4.35–5.65)
RBC #/AREA URNS HPF: ABNORMAL /HPF (ref 0–5)
SODIUM SERPL-SCNC: 138 MMOL/L (ref 136–145)
SP GR UR REFRACTOMETRY: 1.01 (ref 1–1.03)
UROBILINOGEN UR QL STRIP.AUTO: 1 EU/DL (ref 0.2–1)
WBC # BLD AUTO: 7.8 K/UL (ref 4.6–13.2)
WBC URNS QL MICRO: ABNORMAL /HPF (ref 0–4)

## 2023-05-30 PROCEDURE — 36415 COLL VENOUS BLD VENIPUNCTURE: CPT

## 2023-05-30 PROCEDURE — 85027 COMPLETE CBC AUTOMATED: CPT

## 2023-05-30 PROCEDURE — 80048 BASIC METABOLIC PNL TOTAL CA: CPT

## 2023-05-30 PROCEDURE — 87086 URINE CULTURE/COLONY COUNT: CPT

## 2023-05-30 PROCEDURE — 81001 URINALYSIS AUTO W/SCOPE: CPT

## 2023-05-31 LAB
BACTERIA SPEC CULT: NORMAL
SERVICE CMNT-IMP: NORMAL

## 2023-12-12 ENCOUNTER — APPOINTMENT (OUTPATIENT)
Facility: HOSPITAL | Age: 63
End: 2023-12-12
Attending: EMERGENCY MEDICINE
Payer: MEDICARE

## 2023-12-12 ENCOUNTER — HOSPITAL ENCOUNTER (EMERGENCY)
Facility: HOSPITAL | Age: 63
Discharge: HOME | End: 2023-12-12
Attending: EMERGENCY MEDICINE
Payer: MEDICARE

## 2023-12-12 VITALS
TEMPERATURE: 98.1 F | WEIGHT: 172 LBS | HEIGHT: 66 IN | SYSTOLIC BLOOD PRESSURE: 134 MMHG | RESPIRATION RATE: 18 BRPM | HEART RATE: 88 BPM | BODY MASS INDEX: 27.64 KG/M2 | DIASTOLIC BLOOD PRESSURE: 78 MMHG | OXYGEN SATURATION: 99 %

## 2023-12-12 DIAGNOSIS — M79.661 RIGHT CALF PAIN: Primary | ICD-10-CM

## 2023-12-12 LAB — ECHO BSA: 1.91 M2

## 2023-12-12 PROCEDURE — 99284 EMERGENCY DEPT VISIT MOD MDM: CPT

## 2023-12-12 PROCEDURE — 93971 EXTREMITY STUDY: CPT

## 2023-12-12 PROCEDURE — 93971 EXTREMITY STUDY: CPT | Performed by: INTERNAL MEDICINE

## 2023-12-12 ASSESSMENT — PAIN SCALES - GENERAL: PAINLEVEL_OUTOF10: 8

## 2023-12-12 ASSESSMENT — LIFESTYLE VARIABLES
HOW OFTEN DO YOU HAVE A DRINK CONTAINING ALCOHOL: NEVER
HOW MANY STANDARD DRINKS CONTAINING ALCOHOL DO YOU HAVE ON A TYPICAL DAY: PATIENT DOES NOT DRINK

## 2023-12-12 ASSESSMENT — PAIN - FUNCTIONAL ASSESSMENT: PAIN_FUNCTIONAL_ASSESSMENT: 0-10

## 2023-12-12 NOTE — ED NOTES
Discharge instructions given to patient. Follow up information provided, verbalized understanding.       Tito Byrd RN  12/12/23 1439

## 2023-12-12 NOTE — ED PROVIDER NOTES
[facility] EMERGENCY DEPT  eMERGENCY dEPARTMENT eNCOUnter        Pt Name: Roscoe Boxer. MRN: [id number]  Birthdate 1960  Date of evaluation: 12/12/2023  Provider: Zachary Fontenot MD  PCP: Dahlia Gold MD  Note Started: 3:19 PM EST 12/12/2023          CHIEF COMPLAINT       Chief Complaint   Patient presents with    calf injury       HISTORY OFPRESENT ILLNESS        Patient is coming in with right calf pain for 3 days. Wonders whether he injured it while carrying a piece of wood but does not recall a particular injury. States that he was at his vascular doctor pain is in the back of the calf and the anterior tibial region. States that he just left his vascular surgeons office and had a negative arterial studies. By report was not evaluated for the calf swelling. I do not have access to that note or the studies from earlier today. He denies chest pain or shortness of breath. No hemoptysis. By record review, had a right inguinal hernia repair in September of this year. Nursing Notes were all reviewed and agreed with or any disagreements were addressed  in the HPI.     REVIEW OF SYSTEMS         The following 10 systems are reviewed and negative except as noted in the HPI: Constitutional, Eyes, ENT, cardiovascular, pulmonary, GI, , neuro, skin, and musculoskeletal       PASTMEDICAL HISTORY     Past Medical History:   Diagnosis Date    Chronic pain 2021    RT/LT leg    Diabetes mellitus, new onset ([address]) 11/11/2019    no meds now    Hypertension     Liver disease     Hepatitis C- was tx    Low back pain     Lumbar stenosis     L3-L4, L4-L5    Migraine headache     Numbness and tingling in left hand     Radiculopathy     Right L4-L5    Right leg pain     Sensation of cold in leg     Radiating into right posterior buttock and anterior thigh    Unsteady gait          SURGICAL HISTORY       Past Surgical History:   Procedure Laterality Date    CERVICAL FUSION  05/23/2016    ACDF C3/4 C4/5 C5/6

## 2023-12-12 NOTE — DISCHARGE INSTRUCTIONS
Activities as tolerated. He may use over-the-counter medications as desired for your right calf discomfort. Arrange follow-up with primary care in the coming week for reassessment.   Return for any acute concern

## 2023-12-12 NOTE — ED NOTES
Patient advised to remove pants so vascular study can be performed.       Jennifer Saucedo, April, RN  12/12/23 9032

## 2023-12-12 NOTE — ED TRIAGE NOTES
Patient reports having right calf pain for the past 3-4 days. Patient states that he was walking with some heavy wood.

## 2024-02-05 ENCOUNTER — HOSPITAL ENCOUNTER (OUTPATIENT)
Facility: HOSPITAL | Age: 64
Discharge: HOME | End: 2024-02-08
Payer: MEDICARE

## 2024-02-05 ENCOUNTER — TRANSCRIBE ORDERS (OUTPATIENT)
Facility: HOSPITAL | Age: 64
End: 2024-02-05

## 2024-02-05 DIAGNOSIS — Z01.818 PRE-OP EXAM: ICD-10-CM

## 2024-02-05 DIAGNOSIS — Z01.818 PRE-OP EXAM: Primary | ICD-10-CM

## 2024-02-05 LAB
ANION GAP SERPL CALC-SCNC: 4 MMOL/L (ref 3–18)
BASOPHILS # BLD: 0.1 K/UL (ref 0–0.1)
BASOPHILS NFR BLD: 1 % (ref 0–2)
BUN SERPL-MCNC: 12 MG/DL (ref 7–18)
BUN/CREAT SERPL: 13 (ref 12–20)
CALCIUM SERPL-MCNC: 8.9 MG/DL (ref 8.5–10.1)
CHLORIDE SERPL-SCNC: 104 MMOL/L (ref 100–111)
CO2 SERPL-SCNC: 30 MMOL/L (ref 21–32)
CREAT SERPL-MCNC: 0.91 MG/DL (ref 0.6–1.3)
DIFFERENTIAL METHOD BLD: ABNORMAL
EKG ATRIAL RATE: 59 BPM
EKG DIAGNOSIS: NORMAL
EKG P AXIS: 66 DEGREES
EKG P-R INTERVAL: 156 MS
EKG Q-T INTERVAL: 440 MS
EKG QRS DURATION: 80 MS
EKG QTC CALCULATION (BAZETT): 435 MS
EKG R AXIS: 42 DEGREES
EKG T AXIS: 57 DEGREES
EKG VENTRICULAR RATE: 59 BPM
EOSINOPHIL # BLD: 0.2 K/UL (ref 0–0.4)
EOSINOPHIL NFR BLD: 3 % (ref 0–5)
ERYTHROCYTE [DISTWIDTH] IN BLOOD BY AUTOMATED COUNT: 13.8 % (ref 11.6–14.5)
GLUCOSE SERPL-MCNC: 116 MG/DL (ref 74–99)
HCT VFR BLD AUTO: 36 % (ref 36–48)
HGB BLD-MCNC: 12.2 G/DL (ref 13–16)
IMM GRANULOCYTES # BLD AUTO: 0 K/UL (ref 0–0.04)
IMM GRANULOCYTES NFR BLD AUTO: 0 % (ref 0–0.5)
LYMPHOCYTES # BLD: 2.3 K/UL (ref 0.9–3.6)
LYMPHOCYTES NFR BLD: 32 % (ref 21–52)
MCH RBC QN AUTO: 28.7 PG (ref 24–34)
MCHC RBC AUTO-ENTMCNC: 33.9 G/DL (ref 31–37)
MCV RBC AUTO: 84.7 FL (ref 78–100)
MONOCYTES # BLD: 0.6 K/UL (ref 0.05–1.2)
MONOCYTES NFR BLD: 8 % (ref 3–10)
NEUTS SEG # BLD: 3.9 K/UL (ref 1.8–8)
NEUTS SEG NFR BLD: 55 % (ref 40–73)
NRBC # BLD: 0 K/UL (ref 0–0.01)
NRBC BLD-RTO: 0 PER 100 WBC
PLATELET # BLD AUTO: 195 K/UL (ref 135–420)
PMV BLD AUTO: 10.1 FL (ref 9.2–11.8)
POTASSIUM SERPL-SCNC: 3.5 MMOL/L (ref 3.5–5.5)
RBC # BLD AUTO: 4.25 M/UL (ref 4.35–5.65)
SODIUM SERPL-SCNC: 138 MMOL/L (ref 136–145)
WBC # BLD AUTO: 7.1 K/UL (ref 4.6–13.2)

## 2024-02-05 PROCEDURE — 36415 COLL VENOUS BLD VENIPUNCTURE: CPT

## 2024-02-05 PROCEDURE — 80048 BASIC METABOLIC PNL TOTAL CA: CPT

## 2024-02-05 PROCEDURE — 71046 X-RAY EXAM CHEST 2 VIEWS: CPT

## 2024-02-05 PROCEDURE — 93005 ELECTROCARDIOGRAM TRACING: CPT

## 2024-02-05 PROCEDURE — 85025 COMPLETE CBC W/AUTO DIFF WBC: CPT

## 2024-04-01 ENCOUNTER — HOSPITAL ENCOUNTER (OUTPATIENT)
Facility: HOSPITAL | Age: 64
Discharge: HOME | End: 2024-04-04
Payer: MEDICARE

## 2024-04-01 DIAGNOSIS — C61 PROSTATE CANCER (HCC): ICD-10-CM

## 2024-04-01 LAB
APPEARANCE UR: CLEAR
BACTERIA URNS QL MICRO: ABNORMAL /HPF
BILIRUB UR QL: NEGATIVE
COLOR UR: YELLOW
EPITH CASTS URNS QL MICRO: ABNORMAL /LPF (ref 0–5)
GLUCOSE UR STRIP.AUTO-MCNC: NEGATIVE MG/DL
HGB UR QL STRIP: NEGATIVE
KETONES UR QL STRIP.AUTO: NEGATIVE MG/DL
LEUKOCYTE ESTERASE UR QL STRIP.AUTO: NEGATIVE
NITRITE UR QL STRIP.AUTO: NEGATIVE
PH UR STRIP: 5.5 (ref 5–8)
PROT UR STRIP-MCNC: NEGATIVE MG/DL
RBC #/AREA URNS HPF: ABNORMAL /HPF (ref 0–5)
SP GR UR REFRACTOMETRY: 1.02 (ref 1–1.03)
UROBILINOGEN UR QL STRIP.AUTO: 0.2 EU/DL (ref 0.2–1)
WBC URNS QL MICRO: ABNORMAL /HPF (ref 0–4)

## 2024-04-01 PROCEDURE — 87086 URINE CULTURE/COLONY COUNT: CPT

## 2024-04-01 PROCEDURE — 81001 URINALYSIS AUTO W/SCOPE: CPT

## 2024-04-02 LAB
BACTERIA SPEC CULT: NORMAL
SERVICE CMNT-IMP: NORMAL

## 2024-05-31 ENCOUNTER — HOSPITAL ENCOUNTER (OUTPATIENT)
Facility: HOSPITAL | Age: 64
End: 2024-05-31
Payer: MEDICARE

## 2024-05-31 DIAGNOSIS — C61 PROSTATE CANCER (HCC): ICD-10-CM

## 2024-05-31 LAB
APPEARANCE UR: CLEAR
BACTERIA URNS QL MICRO: NEGATIVE /HPF
BILIRUB UR QL: NEGATIVE
COLOR UR: YELLOW
EPITH CASTS URNS QL MICRO: NORMAL /LPF (ref 0–5)
GLUCOSE UR STRIP.AUTO-MCNC: NEGATIVE MG/DL
HGB UR QL STRIP: NEGATIVE
KETONES UR QL STRIP.AUTO: NEGATIVE MG/DL
LEUKOCYTE ESTERASE UR QL STRIP.AUTO: NEGATIVE
NITRITE UR QL STRIP.AUTO: NEGATIVE
PH UR STRIP: 5.5 (ref 5–8)
PROT UR STRIP-MCNC: NEGATIVE MG/DL
RBC #/AREA URNS HPF: NORMAL /HPF (ref 0–5)
SP GR UR REFRACTOMETRY: 1.01 (ref 1–1.03)
UROBILINOGEN UR QL STRIP.AUTO: 0.2 EU/DL (ref 0.2–1)
WBC URNS QL MICRO: NORMAL /HPF (ref 0–4)

## 2024-05-31 PROCEDURE — 36415 COLL VENOUS BLD VENIPUNCTURE: CPT

## 2024-05-31 PROCEDURE — 81001 URINALYSIS AUTO W/SCOPE: CPT

## 2024-09-18 ENCOUNTER — TRANSCRIBE ORDERS (OUTPATIENT)
Facility: HOSPITAL | Age: 64
End: 2024-09-18

## 2024-09-18 ENCOUNTER — HOSPITAL ENCOUNTER (OUTPATIENT)
Facility: HOSPITAL | Age: 64
Discharge: HOME | End: 2024-09-21
Payer: MEDICARE

## 2024-09-18 DIAGNOSIS — M25.551 PAIN OF RIGHT HIP JOINT: ICD-10-CM

## 2024-09-18 DIAGNOSIS — M25.551 PAIN OF RIGHT HIP JOINT: Primary | ICD-10-CM

## 2024-09-18 PROCEDURE — 73502 X-RAY EXAM HIP UNI 2-3 VIEWS: CPT

## 2024-11-15 NOTE — PROGRESS NOTES
Patient: Dillon Faustin Jr.                MRN: [id number]       SSN: [id number]  YOB: 1960        AGE: 64 y.o.        SEX: male      PCP: Geronimo Christy Jr., MD  11/20/24    Chief Complaint   Patient presents with    Hip Pain     Right hip     HISTORY:  Dillon Faustin Jr. is a 64 y.o. male seen for 1 year history of  right groin pain. He denies any hip or back injury. He wonders if his groin pain is related to his mesh inguinal hernia repair on 9/21/23 by Dr. Perez. He denies pain in his right hip.     Mr. Faustin has a history of peripheral vascular disease and claudication. He feels calf pain with walking.  He underwent right femoral artery endarterectomy with patch angioplasty by Dr. Finesse Sullivan 6/14/2021.  He was previously seen by ARLETTE Jimenes, Dr. Argueta, and Dr. Hargrove for lower back pain. He was previously seen by ALVINA Haq for right hip pain.      Occupation, etc: Mr. Faustin is retired. 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 He lives in [place] with his wife. He has 2 adult sons and 1 adult daughter. He weighs 165 lbs and is 5'6\". He is not diabetic or hypertensive.   Wt Readings from Last 3 Encounters:   07/01/24 74.8 kg (165 lb)   06/19/24 74.8 kg (165 lb)   04/24/24 81.2 kg (179 lb)      There is no height or weight on file to calculate BMI.    Patient Active Problem List   Diagnosis    Cervical myelopathy (HCC)    Encounter for long-term use of opiate analgesic    S/P cervical discectomy    Diabetes mellitus, new onset (HCC)    Sciatica    Hepatitis C    Carpal tunnel syndrome    Diabetes mellitus (HCC)    Femoral artery occlusion (HCC)    S/P cervical spinal fusion    Chronic alcohol dependence, continuous (HCC)    Rectal bleeding    Lumbar spinal stenosis    Essential hypertension, benign    Prediabetes    Back pain    Nausea and vomiting    Community acquired pneumonia    Fever    Right upper lobe pneumonia       Social

## 2024-11-20 ENCOUNTER — OFFICE VISIT (OUTPATIENT)
Age: 64
End: 2024-11-20
Payer: MEDICARE

## 2024-11-20 DIAGNOSIS — Z87.19 S/P INGUINAL HERNIA REPAIR: ICD-10-CM

## 2024-11-20 DIAGNOSIS — Z98.890 S/P INGUINAL HERNIA REPAIR: ICD-10-CM

## 2024-11-20 DIAGNOSIS — R10.31 RIGHT GROIN PAIN: ICD-10-CM

## 2024-11-20 DIAGNOSIS — M25.551 RIGHT HIP PAIN: Primary | ICD-10-CM

## 2024-11-20 DIAGNOSIS — I73.9 PERIPHERAL VASCULAR DISEASE (HCC): ICD-10-CM

## 2024-11-20 DIAGNOSIS — I73.9 CLAUDICATION (HCC): ICD-10-CM

## 2024-11-20 PROCEDURE — G8419 CALC BMI OUT NRM PARAM NOF/U: HCPCS | Performed by: SPECIALIST

## 2024-11-20 PROCEDURE — 3017F COLORECTAL CA SCREEN DOC REV: CPT | Performed by: SPECIALIST

## 2024-11-20 PROCEDURE — 99203 OFFICE O/P NEW LOW 30 MIN: CPT | Performed by: SPECIALIST

## 2024-11-20 PROCEDURE — 1123F ACP DISCUSS/DSCN MKR DOCD: CPT | Performed by: SPECIALIST

## 2024-11-20 PROCEDURE — G8484 FLU IMMUNIZE NO ADMIN: HCPCS | Performed by: SPECIALIST

## 2024-11-20 PROCEDURE — 1036F TOBACCO NON-USER: CPT | Performed by: SPECIALIST

## 2024-11-20 PROCEDURE — G8427 DOCREV CUR MEDS BY ELIG CLIN: HCPCS | Performed by: SPECIALIST

## 2024-12-16 ENCOUNTER — HOSPITAL ENCOUNTER (OUTPATIENT)
Facility: HOSPITAL | Age: 64
Discharge: HOME | End: 2024-12-19
Payer: MEDICARE

## 2024-12-16 DIAGNOSIS — I70.213 ATHEROSCLEROSIS OF NATIVE ARTERIES OF EXTREMITIES WITH INTERMITTENT CLAUDICATION, BILATERAL LEGS (HCC): ICD-10-CM

## 2024-12-16 LAB — CREAT UR-MCNC: 1.2 MG/DL (ref 0.6–1.3)

## 2024-12-16 PROCEDURE — 6360000004 HC RX CONTRAST MEDICATION

## 2024-12-16 PROCEDURE — 75635 CT ANGIO ABDOMINAL ARTERIES: CPT

## 2024-12-16 PROCEDURE — 82565 ASSAY OF CREATININE: CPT

## 2024-12-16 RX ORDER — IOPAMIDOL 755 MG/ML
100 INJECTION, SOLUTION INTRAVASCULAR
Status: COMPLETED | OUTPATIENT
Start: 2024-12-16 | End: 2024-12-16

## 2024-12-16 RX ADMIN — IOPAMIDOL 125 ML: 755 INJECTION, SOLUTION INTRAVENOUS at 14:16

## 2025-02-27 ENCOUNTER — APPOINTMENT (OUTPATIENT)
Facility: HOSPITAL | Age: 65
End: 2025-02-27
Payer: MEDICARE

## 2025-02-27 ENCOUNTER — HOSPITAL ENCOUNTER (EMERGENCY)
Facility: HOSPITAL | Age: 65
Discharge: HOME | End: 2025-02-27
Payer: MEDICARE

## 2025-02-27 VITALS
DIASTOLIC BLOOD PRESSURE: 74 MMHG | HEART RATE: 78 BPM | WEIGHT: 168 LBS | BODY MASS INDEX: 27 KG/M2 | OXYGEN SATURATION: 99 % | RESPIRATION RATE: 17 BRPM | TEMPERATURE: 98.8 F | SYSTOLIC BLOOD PRESSURE: 116 MMHG | HEIGHT: 66 IN

## 2025-02-27 DIAGNOSIS — M54.42 ACUTE LEFT-SIDED LOW BACK PAIN WITH LEFT-SIDED SCIATICA: Primary | ICD-10-CM

## 2025-02-27 LAB
ALBUMIN SERPL-MCNC: 3.7 G/DL (ref 3.4–5)
ALBUMIN/GLOB SERPL: 0.9 (ref 0.8–1.7)
ALP SERPL-CCNC: 73 U/L (ref 45–117)
ALT SERPL-CCNC: 19 U/L (ref 16–61)
ANION GAP SERPL CALC-SCNC: 5 MMOL/L (ref 3–18)
APPEARANCE UR: CLEAR
AST SERPL-CCNC: 17 U/L (ref 10–38)
BASOPHILS # BLD: 0.04 K/UL (ref 0–0.1)
BASOPHILS NFR BLD: 0.6 % (ref 0–2)
BILIRUB SERPL-MCNC: 0.6 MG/DL (ref 0.2–1)
BILIRUB UR QL: NEGATIVE
BUN SERPL-MCNC: 13 MG/DL (ref 7–18)
BUN/CREAT SERPL: 13 (ref 12–20)
CALCIUM SERPL-MCNC: 9.1 MG/DL (ref 8.5–10.1)
CHLORIDE SERPL-SCNC: 101 MMOL/L (ref 100–111)
CO2 SERPL-SCNC: 28 MMOL/L (ref 21–32)
COLOR UR: YELLOW
CREAT SERPL-MCNC: 1.04 MG/DL (ref 0.6–1.3)
DIFFERENTIAL METHOD BLD: ABNORMAL
EOSINOPHIL # BLD: 0.2 K/UL (ref 0–0.4)
EOSINOPHIL NFR BLD: 3 % (ref 0–5)
ERYTHROCYTE [DISTWIDTH] IN BLOOD BY AUTOMATED COUNT: 14.5 % (ref 11.6–14.5)
GLOBULIN SER CALC-MCNC: 3.9 G/DL (ref 2–4)
GLUCOSE SERPL-MCNC: 148 MG/DL (ref 74–99)
GLUCOSE UR STRIP.AUTO-MCNC: NEGATIVE MG/DL
HCT VFR BLD AUTO: 33.6 % (ref 36–48)
HGB BLD-MCNC: 10.8 G/DL (ref 13–16)
HGB UR QL STRIP: NEGATIVE
IMM GRANULOCYTES # BLD AUTO: 0.02 K/UL (ref 0–0.04)
IMM GRANULOCYTES NFR BLD AUTO: 0.3 % (ref 0–0.5)
KETONES UR QL STRIP.AUTO: ABNORMAL MG/DL
LEUKOCYTE ESTERASE UR QL STRIP.AUTO: NEGATIVE
LYMPHOCYTES # BLD: 1.75 K/UL (ref 0.9–3.6)
LYMPHOCYTES NFR BLD: 26.6 % (ref 21–52)
MCH RBC QN AUTO: 25.8 PG (ref 24–34)
MCHC RBC AUTO-ENTMCNC: 32.1 G/DL (ref 31–37)
MCV RBC AUTO: 80.4 FL (ref 78–100)
MONOCYTES # BLD: 0.54 K/UL (ref 0.05–1.2)
MONOCYTES NFR BLD: 8.2 % (ref 3–10)
NEUTS SEG # BLD: 4.04 K/UL (ref 1.8–8)
NEUTS SEG NFR BLD: 61.3 % (ref 40–73)
NITRITE UR QL STRIP.AUTO: NEGATIVE
NRBC # BLD: 0 K/UL (ref 0–0.01)
NRBC BLD-RTO: 0 PER 100 WBC
PH UR STRIP: 6.5 (ref 5–8)
PLATELET # BLD AUTO: 247 K/UL (ref 135–420)
PMV BLD AUTO: 10.1 FL (ref 9.2–11.8)
POTASSIUM SERPL-SCNC: 3.3 MMOL/L (ref 3.5–5.5)
PROT SERPL-MCNC: 7.6 G/DL (ref 6.4–8.2)
PROT UR STRIP-MCNC: NEGATIVE MG/DL
RBC # BLD AUTO: 4.18 M/UL (ref 4.35–5.65)
SODIUM SERPL-SCNC: 134 MMOL/L (ref 136–145)
SP GR UR REFRACTOMETRY: 1.02 (ref 1–1.03)
UROBILINOGEN UR QL STRIP.AUTO: 1 EU/DL (ref 0.2–1)
WBC # BLD AUTO: 6.6 K/UL (ref 4.6–13.2)

## 2025-02-27 PROCEDURE — 74176 CT ABD & PELVIS W/O CONTRAST: CPT

## 2025-02-27 PROCEDURE — 96374 THER/PROPH/DIAG INJ IV PUSH: CPT

## 2025-02-27 PROCEDURE — 80053 COMPREHEN METABOLIC PANEL: CPT

## 2025-02-27 PROCEDURE — 2580000003 HC RX 258: Performed by: EMERGENCY MEDICINE

## 2025-02-27 PROCEDURE — 99284 EMERGENCY DEPT VISIT MOD MDM: CPT

## 2025-02-27 PROCEDURE — 85025 COMPLETE CBC W/AUTO DIFF WBC: CPT

## 2025-02-27 PROCEDURE — 81003 URINALYSIS AUTO W/O SCOPE: CPT

## 2025-02-27 PROCEDURE — 6360000002 HC RX W HCPCS: Performed by: EMERGENCY MEDICINE

## 2025-02-27 PROCEDURE — 87086 URINE CULTURE/COLONY COUNT: CPT

## 2025-02-27 RX ORDER — PREDNISONE 10 MG/1
TABLET ORAL
Qty: 21 EACH | Refills: 1 | Status: SHIPPED | OUTPATIENT
Start: 2025-02-27

## 2025-02-27 RX ORDER — 0.9 % SODIUM CHLORIDE 0.9 %
1000 INTRAVENOUS SOLUTION INTRAVENOUS ONCE
Status: COMPLETED | OUTPATIENT
Start: 2025-02-27 | End: 2025-02-27

## 2025-02-27 RX ORDER — METHOCARBAMOL 750 MG/1
750 TABLET, FILM COATED ORAL 4 TIMES DAILY
Qty: 28 TABLET | Refills: 0 | Status: SHIPPED | OUTPATIENT
Start: 2025-02-27 | End: 2025-03-06

## 2025-02-27 RX ORDER — LIDOCAINE 50 MG/G
1 PATCH TOPICAL DAILY
Qty: 10 PATCH | Refills: 0 | Status: SHIPPED | OUTPATIENT
Start: 2025-02-27 | End: 2025-03-09

## 2025-02-27 RX ORDER — KETOROLAC TROMETHAMINE 15 MG/ML
15 INJECTION, SOLUTION INTRAMUSCULAR; INTRAVENOUS
Status: COMPLETED | OUTPATIENT
Start: 2025-02-27 | End: 2025-02-27

## 2025-02-27 RX ADMIN — SODIUM CHLORIDE 1000 ML: 9 INJECTION, SOLUTION INTRAVENOUS at 14:14

## 2025-02-27 RX ADMIN — KETOROLAC TROMETHAMINE 15 MG: 15 INJECTION, SOLUTION INTRAMUSCULAR; INTRAVENOUS at 14:14

## 2025-02-27 ASSESSMENT — PAIN DESCRIPTION - ORIENTATION
ORIENTATION: LEFT
ORIENTATION: LEFT

## 2025-02-27 ASSESSMENT — PAIN DESCRIPTION - DESCRIPTORS
DESCRIPTORS: SHARP
DESCRIPTORS: ACHING

## 2025-02-27 ASSESSMENT — PAIN - FUNCTIONAL ASSESSMENT: PAIN_FUNCTIONAL_ASSESSMENT: 0-10

## 2025-02-27 ASSESSMENT — PAIN DESCRIPTION - LOCATION
LOCATION: FLANK
LOCATION: FLANK

## 2025-02-27 ASSESSMENT — PAIN SCALES - GENERAL
PAINLEVEL_OUTOF10: 8
PAINLEVEL_OUTOF10: 10

## 2025-02-27 NOTE — ED TRIAGE NOTES
Pt complaining of L  flank pain x 2 days , denies fall/injury . Denies any pain in urination, hx of prostate cancer and BPH

## 2025-02-27 NOTE — ED PROVIDER NOTES
EMERGENCY DEPARTMENT HISTORY AND PHYSICAL EXAM      Date: 2/27/2025  Patient Name: Dillon Faustin Jr.    History of Presenting Illness     Chief Complaint   Patient presents with    Flank Pain       History (Context): Dillon Faustin Jr. is a 64 y.o. male  presents to the ED today with chief complaint of left lower flank pain since this morning.  Was out drinking quite a lot late yesterday says that he does not know if from the partying he is dehydrated.  Denies nausea vomiting hematuria dysuria.  Denies any bruising or falls.  Pain seems to be worse when he is rising from a sitting to standing and then lowering himself from a standing to sitting position.      PCP: Geronimo Christy Jr., MD    Current Facility-Administered Medications   Medication Dose Route Frequency Provider Last Rate Last Admin    sodium chloride 0.9 % bolus 1,000 mL  1,000 mL IntraVENous Once Evelyne Head .6 mL/hr at 02/27/25 1414 1,000 mL at 02/27/25 1414     Current Outpatient Medications   Medication Sig Dispense Refill    predniSONE 10 MG (21) TBPK Take 6 tablets on day 1; take 5 tablets on day 2; take 4 tablets on day 3; take 3 tablets on day 4; take 2 tablets on day 5; take 1 tablet on day 6. 21 each 1    methocarbamol (ROBAXIN-750) 750 MG tablet Take 1 tablet by mouth 4 times daily for 7 days 28 tablet 0    lidocaine (LIDODERM) 5 % Place 1 patch onto the skin daily for 10 days 12 hours on, 12 hours off. 10 patch 0    finasteride (PROSCAR) 5 MG tablet Take 1 tablet by mouth daily 90 tablet 3    tamsulosin (FLOMAX) 0.4 MG capsule Take 1 capsule by mouth daily 90 capsule 3    dutasteride (AVODART) 0.5 MG capsule Take 1 capsule by mouth daily 90 capsule 3    amLODIPine (NORVASC) 10 MG tablet       lisinopril-hydroCHLOROthiazide (PRINZIDE;ZESTORETIC) 20-12.5 MG per tablet       atorvastatin (LIPITOR) 10 MG tablet Take 1 tablet by mouth daily      omeprazole (PRILOSEC) 40 MG delayed release capsule Take 1 capsule by mouth daily

## 2025-02-28 LAB
BACTERIA SPEC CULT: NORMAL
SERVICE CMNT-IMP: NORMAL

## 2025-05-12 ENCOUNTER — HOSPITAL ENCOUNTER (OUTPATIENT)
Facility: HOSPITAL | Age: 65
Setting detail: SPECIMEN
Discharge: HOME | End: 2025-05-15

## 2025-05-12 ENCOUNTER — HOSPITAL ENCOUNTER (OUTPATIENT)
Facility: HOSPITAL | Age: 65
Discharge: HOME | End: 2025-05-15
Payer: MEDICARE

## 2025-05-12 ENCOUNTER — TRANSCRIBE ORDERS (OUTPATIENT)
Facility: HOSPITAL | Age: 65
End: 2025-05-12

## 2025-05-12 DIAGNOSIS — I70.213 ATHEROSCLEROSIS OF NATIVE ARTERY OF BOTH LOWER EXTREMITIES WITH INTERMITTENT CLAUDICATION: ICD-10-CM

## 2025-05-12 DIAGNOSIS — K22.4 ESOPHAGEAL DYSMOTILITY: ICD-10-CM

## 2025-05-12 DIAGNOSIS — R13.19 ESOPHAGEAL DYSPHAGIA: ICD-10-CM

## 2025-05-12 DIAGNOSIS — K76.9 LIVER LESION: ICD-10-CM

## 2025-05-12 DIAGNOSIS — K21.9 GASTROESOPHAGEAL REFLUX DISEASE, UNSPECIFIED WHETHER ESOPHAGITIS PRESENT: ICD-10-CM

## 2025-05-12 DIAGNOSIS — R93.3 ABNORMAL FINDING ON GI TRACT IMAGING: Primary | ICD-10-CM

## 2025-05-12 DIAGNOSIS — E66.3 OVERWEIGHT WITH BODY MASS INDEX (BMI) OF 26 TO 26.9 IN ADULT: ICD-10-CM

## 2025-05-12 LAB
EKG ATRIAL RATE: 90 BPM
EKG DIAGNOSIS: NORMAL
EKG P AXIS: 65 DEGREES
EKG P-R INTERVAL: 152 MS
EKG Q-T INTERVAL: 390 MS
EKG QRS DURATION: 84 MS
EKG QTC CALCULATION (BAZETT): 477 MS
EKG R AXIS: 41 DEGREES
EKG T AXIS: 55 DEGREES
EKG VENTRICULAR RATE: 90 BPM
LABCORP SPECIMEN COLLECTION: NORMAL

## 2025-05-12 PROCEDURE — 99001 SPECIMEN HANDLING PT-LAB: CPT

## 2025-05-12 PROCEDURE — 71046 X-RAY EXAM CHEST 2 VIEWS: CPT

## 2025-05-12 PROCEDURE — 93005 ELECTROCARDIOGRAM TRACING: CPT

## 2025-05-12 PROCEDURE — 93010 ELECTROCARDIOGRAM REPORT: CPT | Performed by: INTERNAL MEDICINE

## 2025-06-09 ENCOUNTER — HOSPITAL ENCOUNTER (OUTPATIENT)
Age: 65
Discharge: HOME | End: 2025-06-12
Payer: MEDICARE

## 2025-06-09 DIAGNOSIS — E66.3 OVERWEIGHT WITH BODY MASS INDEX (BMI) OF 26 TO 26.9 IN ADULT: ICD-10-CM

## 2025-06-09 DIAGNOSIS — R93.3 ABNORMAL FINDING ON GI TRACT IMAGING: ICD-10-CM

## 2025-06-09 DIAGNOSIS — K22.4 ESOPHAGEAL DYSMOTILITY: ICD-10-CM

## 2025-06-09 DIAGNOSIS — K76.9 LIVER LESION: ICD-10-CM

## 2025-06-09 DIAGNOSIS — R13.19 ESOPHAGEAL DYSPHAGIA: ICD-10-CM

## 2025-06-09 DIAGNOSIS — K21.9 GASTROESOPHAGEAL REFLUX DISEASE, UNSPECIFIED WHETHER ESOPHAGITIS PRESENT: ICD-10-CM

## 2025-06-09 LAB — CREAT UR-MCNC: 1 MG/DL (ref 0.6–1.3)

## 2025-06-09 PROCEDURE — A9577 INJ MULTIHANCE: HCPCS | Performed by: PHYSICIAN ASSISTANT

## 2025-06-09 PROCEDURE — 6360000004 HC RX CONTRAST MEDICATION: Performed by: PHYSICIAN ASSISTANT

## 2025-06-09 PROCEDURE — 82565 ASSAY OF CREATININE: CPT

## 2025-06-09 PROCEDURE — 74183 MRI ABD W/O CNTR FLWD CNTR: CPT

## 2025-06-09 RX ADMIN — GADOBENATE DIMEGLUMINE 20 ML: 529 INJECTION, SOLUTION INTRAVENOUS at 11:43

## 2025-06-16 RX ORDER — NAPROXEN 500 MG/1
500 TABLET ORAL PRN
COMMUNITY
Start: 2024-09-17

## 2025-06-16 RX ORDER — HYDROCODONE BITARTRATE AND ACETAMINOPHEN 7.5; 325 MG/1; MG/1
1 TABLET ORAL PRN
COMMUNITY
Start: 2025-04-23

## 2025-06-16 NOTE — PROGRESS NOTES
Instructions for your procedure at [facility]      Today's Date: 6/16/2025      Patient's Name: Diloln Faustin Jr.      Procedure Date: June 24, 2025        Please enter the main entrance of the hospital and check-in at the  located in the lobby.      Do NOT eat or drink anything, including candy, gum, or ice chips after midnight prior to your procedure, unless it is part of your prep.  Brush your teeth before coming to the hospital.You may swish with water, but do not swallow.  No smoking/Vaping/E-Cigarettes 24 hours prior to the day of procedure.  No alcohol 24 hours prior to the day of procedure.  No recreational drugs for one week prior to the day of procedure.  Bring Photo ID, Insurance information, and Co-pay if required on day of procedure.  Bring in pertinent legal documents, such as, Medical Power of , DNR, Advance Directive, etc.  Leave all other valuables, including money/purse, weapons at home.  Remove jewelry, including ALL body piercings, nail polish, acrylic nails, and makeup (including mascara); no lotions, powders, deodorant, and/or perfume/cologne/after shave on the skin.  Glasses and dentures may be worn to the hospital.  They must be removed prior to procedure. Please bring case/container for glasses or dentures.  11. Contacts should not be worn on day of procedure.   12. Call the office ([phone]) if you have symptoms of a cold or illness within 24-48 hours prior to your procedure.   13. AN ADULT (relative or friend 18 years or older) MUST DRIVE YOU HOME AFTER YOUR PROCEDURE.   14. Please make arrangements for a responsible adult (18 years or older) to be with you for 24 hours after your procedure.   15. TWO VISITORS will be allowed in the waiting area during your procedure.       Special Instructions:      Bring list of CURRENT medications.  Follow instructions from the office regarding Bowel Prep, Vitamins, Iron, Blood Thinners, Insulin,

## 2025-06-18 ENCOUNTER — HOSPITAL ENCOUNTER (EMERGENCY)
Facility: HOSPITAL | Age: 65
Discharge: HOME | End: 2025-06-18
Attending: STUDENT IN AN ORGANIZED HEALTH CARE EDUCATION/TRAINING PROGRAM
Payer: MEDICARE

## 2025-06-18 ENCOUNTER — APPOINTMENT (OUTPATIENT)
Facility: HOSPITAL | Age: 65
End: 2025-06-18
Payer: MEDICARE

## 2025-06-18 VITALS
DIASTOLIC BLOOD PRESSURE: 81 MMHG | SYSTOLIC BLOOD PRESSURE: 130 MMHG | TEMPERATURE: 99 F | HEART RATE: 63 BPM | WEIGHT: 165 LBS | BODY MASS INDEX: 26.52 KG/M2 | RESPIRATION RATE: 20 BRPM | HEIGHT: 66 IN | OXYGEN SATURATION: 99 %

## 2025-06-18 DIAGNOSIS — M79.671 PAIN OF RIGHT HEEL: Primary | ICD-10-CM

## 2025-06-18 PROCEDURE — 73620 X-RAY EXAM OF FOOT: CPT

## 2025-06-18 PROCEDURE — 99283 EMERGENCY DEPT VISIT LOW MDM: CPT

## 2025-06-18 PROCEDURE — 6370000000 HC RX 637 (ALT 250 FOR IP): Performed by: STUDENT IN AN ORGANIZED HEALTH CARE EDUCATION/TRAINING PROGRAM

## 2025-06-18 RX ORDER — HYDROCODONE BITARTRATE AND ACETAMINOPHEN 5; 325 MG/1; MG/1
1 TABLET ORAL
Refills: 0 | Status: DISCONTINUED | OUTPATIENT
Start: 2025-06-18 | End: 2025-06-18 | Stop reason: HOSPADM

## 2025-06-18 RX ORDER — IBUPROFEN 600 MG/1
600 TABLET, FILM COATED ORAL
Status: COMPLETED | OUTPATIENT
Start: 2025-06-18 | End: 2025-06-18

## 2025-06-18 RX ORDER — KETOROLAC TROMETHAMINE 10 MG/1
10 TABLET, FILM COATED ORAL EVERY 6 HOURS PRN
Qty: 20 TABLET | Refills: 0 | Status: SHIPPED | OUTPATIENT
Start: 2025-06-18

## 2025-06-18 RX ORDER — HYDROCODONE BITARTRATE AND ACETAMINOPHEN 5; 325 MG/1; MG/1
1 TABLET ORAL EVERY 4 HOURS PRN
Qty: 4 TABLET | Refills: 0 | Status: SHIPPED | OUTPATIENT
Start: 2025-06-18 | End: 2025-06-21

## 2025-06-18 RX ADMIN — IBUPROFEN 600 MG: 600 TABLET ORAL at 07:54

## 2025-06-18 ASSESSMENT — PAIN - FUNCTIONAL ASSESSMENT: PAIN_FUNCTIONAL_ASSESSMENT: 0-10

## 2025-06-18 ASSESSMENT — PAIN SCALES - GENERAL: PAINLEVEL_OUTOF10: 10

## 2025-06-18 NOTE — ED NOTES
A walking boot was ordered, when the tech went to apply the patient stated they would not be able to drive with that on. The tech stated ok. The patient states they will put it on once they get back home, and stated they knew how and would be able to put it on. The nurse was advised.

## 2025-06-18 NOTE — DISCHARGE INSTRUCTIONS
You were evaluated for right heel pain .  Based on your work-up it was deemed that you were stable for discharge.  Please  your medication of norco and toradol which was prescribed to you.  Please follow-up with your primary care physician if you have any further concerns and go over your work-up.  If you experience any chest pain, shortness of breath, worsening abdominal pain, vomiting blood, worsening headache, seizures, or any worsening of your symptoms please return to the emergency department immediately.  If you have any pending results or any further questions please contact the emergency department at [phone].

## 2025-06-18 NOTE — ED PROVIDER NOTES
status is at baseline.           Diagnostic Study Results     Labs -  No results found for this or any previous visit (from the past 72 hours).    Radiologic Studies -   XR FOOT RIGHT (2 VIEWS)   Final Result      1.  No acute findings.   2.  Mild first metatarsal phalangeal joint osteoarthritis.   3.  Atherosclerotic vascular calcifications.   4.  Calcification of the Achilles tendon suggestive of chronic tendinosis.      Electronically signed by Mary Rodrigues            Medical Decision Making   I am the first provider for this patient.    I reviewed the vital signs, available nursing notes, past medical history, past surgical history, family history and social history.    Vital Signs-Reviewed the patient's vital signs.      EKG: none       ED Course: Progress Notes, Reevaluation, and Consults:    Provider Notes (Medical Decision Making):     MDM  64-year-old male presents with right heel pain.  Will consider musculoskeletal strain of the heel.  Will consider arthritis.  Low suspicion for any acute fracture or dislocation.  Low suspicion for Achilles tear.  Consider Achilles tendinopathy.  Will obtain imaging.  X-ray imaging shows no signs of acute pathology.  Patient will be discharged with outpatient follow-up.  Patient placed in a boot.         Patient was given the following medications:  Medications   ibuprofen (ADVIL;MOTRIN) tablet 600 mg (600 mg Oral Given 6/18/25 3074)       CONSULTS: (Who and What was discussed)  None    Chronic Conditions: none     Social Determinants affecting Dx or Tx: None    Records Reviewed (source and summary of external notes): none     Procedures    Critical Care Time: none     SCREENING TOOLS:  None    CLINICAL MANAGEMENT TOOLS:  Not Applicable      Diagnosis     Clinical Impression:   1. Pain of right heel        Disposition: home     Geronimo Christy Jr., MD  [address] [facility] [address] [phone]    Schedule an appointment as soon as possible for a

## 2025-06-23 ENCOUNTER — ANESTHESIA EVENT (OUTPATIENT)
Facility: HOSPITAL | Age: 65
End: 2025-06-23
Payer: MEDICARE

## 2025-06-24 ENCOUNTER — ANESTHESIA (OUTPATIENT)
Facility: HOSPITAL | Age: 65
End: 2025-06-24
Payer: MEDICARE

## 2025-06-24 ENCOUNTER — HOSPITAL ENCOUNTER (OUTPATIENT)
Facility: HOSPITAL | Age: 65
Setting detail: OUTPATIENT SURGERY
Discharge: HOME | End: 2025-06-24
Attending: INTERNAL MEDICINE | Admitting: INTERNAL MEDICINE
Payer: MEDICARE

## 2025-06-24 VITALS
HEART RATE: 79 BPM | TEMPERATURE: 98.1 F | DIASTOLIC BLOOD PRESSURE: 79 MMHG | WEIGHT: 172 LBS | OXYGEN SATURATION: 99 % | SYSTOLIC BLOOD PRESSURE: 132 MMHG | BODY MASS INDEX: 27.64 KG/M2 | HEIGHT: 66 IN | RESPIRATION RATE: 20 BRPM

## 2025-06-24 LAB
AMPHET UR QL SCN: NEGATIVE
BARBITURATES UR QL SCN: NEGATIVE
BENZODIAZ UR QL: NEGATIVE
CANNABINOIDS UR QL SCN: NEGATIVE
COCAINE UR QL SCN: NEGATIVE
FENTANYL: NEGATIVE
Lab: NORMAL
METHADONE UR QL: NEGATIVE
OPIATES UR QL: NEGATIVE
OXYCODONE UR QL SCN: NEGATIVE

## 2025-06-24 PROCEDURE — 3700000001 HC ADD 15 MINUTES (ANESTHESIA): Performed by: INTERNAL MEDICINE

## 2025-06-24 PROCEDURE — 2580000003 HC RX 258: Performed by: NURSE ANESTHETIST, CERTIFIED REGISTERED

## 2025-06-24 PROCEDURE — 6360000002 HC RX W HCPCS: Performed by: ANESTHESIOLOGY

## 2025-06-24 PROCEDURE — 3600007502: Performed by: INTERNAL MEDICINE

## 2025-06-24 PROCEDURE — 3600007512: Performed by: INTERNAL MEDICINE

## 2025-06-24 PROCEDURE — 80307 DRUG TEST PRSMV CHEM ANLYZR: CPT

## 2025-06-24 PROCEDURE — 7100000000 HC PACU RECOVERY - FIRST 15 MIN: Performed by: INTERNAL MEDICINE

## 2025-06-24 PROCEDURE — 7100000010 HC PHASE II RECOVERY - FIRST 15 MIN: Performed by: INTERNAL MEDICINE

## 2025-06-24 PROCEDURE — 3700000000 HC ANESTHESIA ATTENDED CARE: Performed by: INTERNAL MEDICINE

## 2025-06-24 PROCEDURE — 2709999900 HC NON-CHARGEABLE SUPPLY: Performed by: INTERNAL MEDICINE

## 2025-06-24 RX ORDER — SODIUM CHLORIDE, SODIUM LACTATE, POTASSIUM CHLORIDE, CALCIUM CHLORIDE 600; 310; 30; 20 MG/100ML; MG/100ML; MG/100ML; MG/100ML
INJECTION, SOLUTION INTRAVENOUS CONTINUOUS
Status: DISCONTINUED | OUTPATIENT
Start: 2025-06-24 | End: 2025-06-24 | Stop reason: HOSPADM

## 2025-06-24 RX ORDER — PROPOFOL 10 MG/ML
INJECTION, EMULSION INTRAVENOUS
Status: DISCONTINUED | OUTPATIENT
Start: 2025-06-24 | End: 2025-06-24 | Stop reason: SDUPTHER

## 2025-06-24 RX ORDER — LIDOCAINE HYDROCHLORIDE 20 MG/ML
INJECTION, SOLUTION EPIDURAL; INFILTRATION; INTRACAUDAL; PERINEURAL
Status: DISCONTINUED | OUTPATIENT
Start: 2025-06-24 | End: 2025-06-24 | Stop reason: SDUPTHER

## 2025-06-24 RX ORDER — LIDOCAINE HYDROCHLORIDE 10 MG/ML
1 INJECTION, SOLUTION EPIDURAL; INFILTRATION; INTRACAUDAL; PERINEURAL
Status: DISCONTINUED | OUTPATIENT
Start: 2025-06-24 | End: 2025-06-24 | Stop reason: HOSPADM

## 2025-06-24 RX ADMIN — PROPOFOL 25 MG: 10 INJECTION, EMULSION INTRAVENOUS at 09:38

## 2025-06-24 RX ADMIN — PROPOFOL 100 MG: 10 INJECTION, EMULSION INTRAVENOUS at 09:36

## 2025-06-24 RX ADMIN — LIDOCAINE HYDROCHLORIDE 60 MG: 20 INJECTION, SOLUTION EPIDURAL; INFILTRATION; INTRACAUDAL; PERINEURAL at 09:36

## 2025-06-24 RX ADMIN — SODIUM CHLORIDE, SODIUM LACTATE, POTASSIUM CHLORIDE, AND CALCIUM CHLORIDE: 600; 310; 30; 20 INJECTION, SOLUTION INTRAVENOUS at 08:14

## 2025-06-24 ASSESSMENT — PAIN - FUNCTIONAL ASSESSMENT
PAIN_FUNCTIONAL_ASSESSMENT: NONE - DENIES PAIN
PAIN_FUNCTIONAL_ASSESSMENT: 0-10

## 2025-06-24 NOTE — H&P
Chief Complaint:GERD and dysphagia    History of present illness: Long standing heartburn.     PMH:   Past Medical History:   Diagnosis Date    Chronic pain     RT/LT leg    Diabetes mellitus, new onset (HCC) 2019    no meds now    Femoral artery occlusion, right     Hypertension     Liver disease     Hepatitis C- was tx    Low back pain     Lumbar stenosis     L3-L4, L4-L5    Migraine headache     Numbness and tingling in left hand     PAD (peripheral artery disease)     Pneumonia     Prostate cancer (HCC)     Radiculopathy     Right L4-L5    Right leg pain     Sensation of cold in leg     Radiating into right posterior buttock and anterior thigh    Unsteady gait      Allergies:   Allergies   Allergen Reactions    Duloxetine Hcl Other (See Comments)    Oxycodone-Acetaminophen Itching and Other (See Comments)     Denies    Rabeprazole Itching    Tomato Itching     Medications:   Current Facility-Administered Medications:     lactated ringers infusion, , IntraVENous, Continuous, Britney Russ APRN - CRNA, Last Rate: 125 mL/hr at 25 0814, New Bag at 25 0814    lidocaine PF 1 % injection 1 mL, 1 mL, IntraDERmal, Once PRN, Britney Russ APRN - CRNA    famotidine (PEPCID) 20 MG/2ML 20 mg in sodium chloride (PF) 0.9 % 10 mL injection, 20 mg, IntraVENous, Once, Britney Russ APRN - CRNA  FH:   Family History   Problem Relation Age of Onset    Hypertension Mother     Heart Disease Mother     Diabetes Mother     Hypertension Father      Social:   Social History     Socioeconomic History    Marital status:      Spouse name: None    Number of children: None    Years of education: None    Highest education level: None   Tobacco Use    Smoking status: Former     Current packs/day: 0.00     Types: Cigarettes     Quit date: 2016     Years since quittin.1    Smokeless tobacco: Never   Vaping Use    Vaping status: Never Used   Substance and Sexual Activity    Alcohol use: Yes

## 2025-06-24 NOTE — ANESTHESIA POSTPROCEDURE EVALUATION
Department of Anesthesiology  Postprocedure Note    Patient: Dillon Faustin Jr.  MRN: [id number]  YOB: 1960  Date of evaluation: 6/24/2025    Procedure Summary       Date: 06/24/25 Room / Location: [facility] ENDO 02 / [facility] ENDOSCOPY    Anesthesia Start: 0930 Anesthesia Stop: 0950    Procedure: ESOPHAGOGASTRODUODENOSCOPY DILATATION 48 FR and 50 FR (Upper GI Region) Diagnosis:       Abnormal findings on radiological examination of gastrointestinal tract      Gastroesophageal reflux disease, unspecified whether esophagitis present      Esophageal dysphagia      Esophageal dysmotility      Liver lesion      Over weight      (Abnormal findings on radiological examination of gastrointestinal tract [R93.3])      (Gastroesophageal reflux disease, unspecified whether esophagitis present [K21.9])      (Esophageal dysphagia [R13.19])      (Esophageal dysmotility [K22.4])      (Liver lesion [K76.9])      (Over weight [E66.3])    Surgeons: Jus Xiong MD Responsible Provider: Amanda Mason MD    Anesthesia Type: MAC ASA Status: 3            Anesthesia Type: No value filed.    Rita Phase I: Rita Score: 10    Rita Phase II: Rita Score: 10    Anesthesia Post Evaluation    Patient location during evaluation: PACU  Patient participation: complete - patient participated  Level of consciousness: awake and alert  Pain score: 0  Airway patency: patent  Nausea & Vomiting: no nausea and no vomiting  Cardiovascular status: hemodynamically stable  Respiratory status: acceptable  Hydration status: euvolemic  Multimodal analgesia pain management approach  Pain management: adequate    No notable events documented.

## 2025-06-24 NOTE — ANESTHESIA PRE PROCEDURE
Department of Anesthesiology  Preprocedure Note       Name:  Dillon Faustin Jr.   Age:  64 y.o.  :  1960                                          MRN:  [id number]         Date:  2025      Surgeon: Surgeon(s):  Jus Xiong MD    Procedure: Procedure(s):  ESOPHAGOGASTRODUODENOSCOPY DILATATION    Medications prior to admission:   Prior to Admission medications    Medication Sig Start Date End Date Taking? Authorizing Provider   HYDROcodone-acetaminophen (NORCO) 7.5-325 MG per tablet Take 1 tablet by mouth as needed. 25  Yes Provider, MD Anand   naloxone 4 MG/0.1ML LIQD nasal spray 1 spray by Nasal route as needed 3/18/25  Yes Provider, MD Anand   naproxen (NAPROSYN) 500 MG tablet Take 1 tablet by mouth as needed 24  Yes Provider, MD Anand   finasteride (PROSCAR) 5 MG tablet Take 1 tablet by mouth daily 24  Yes Chin Carlton MD   tamsulosin (FLOMAX) 0.4 MG capsule Take 1 capsule by mouth daily 24  Yes Yana Centeno APRN - NP   amLODIPine (NORVASC) 10 MG tablet Take 1 tablet by mouth daily 23  Yes Provider, MD Anand   lisinopril-hydroCHLOROthiazide (PRINZIDE;ZESTORETIC) 20-12.5 MG per tablet Take 1 tablet by mouth daily 23  Yes Provider, MD Anand   atorvastatin (LIPITOR) 10 MG tablet Take 1 tablet by mouth daily   Yes Automatic Reconciliation, Ar   omeprazole (PRILOSEC) 40 MG delayed release capsule Take 1 capsule by mouth daily 6/10/22  Yes Automatic Reconciliation, Ar   ketorolac (TORADOL) 10 MG tablet Take 1 tablet by mouth every 6 hours as needed for Pain 25   Virgilio Edmond MD   dutasteride (AVODART) 0.5 MG capsule Take 1 capsule by mouth daily  Patient not taking: Reported on 2025   Chin Carlton MD       Current medications:    Current Facility-Administered Medications   Medication Dose Route Frequency Provider Last Rate Last Admin   • lactated ringers infusion   IntraVENous Continuous Britney Russ

## 2025-07-11 ENCOUNTER — HOSPITAL ENCOUNTER (OUTPATIENT)
Facility: HOSPITAL | Age: 65
Discharge: HOME | End: 2025-07-14

## 2025-07-11 VITALS
HEART RATE: 87 BPM | RESPIRATION RATE: 16 BRPM | DIASTOLIC BLOOD PRESSURE: 88 MMHG | SYSTOLIC BLOOD PRESSURE: 152 MMHG | OXYGEN SATURATION: 98 % | TEMPERATURE: 98.8 F

## 2025-07-11 RX ORDER — LIDOCAINE HYDROCHLORIDE 20 MG/ML
JELLY TOPICAL ONCE
Status: DISCONTINUED | OUTPATIENT
Start: 2025-07-11 | End: 2025-07-15 | Stop reason: HOSPADM

## 2025-07-11 RX ORDER — SODIUM CHLORIDE 1 G/1
1 TABLET ORAL ONCE
Status: DISCONTINUED | OUTPATIENT
Start: 2025-07-11 | End: 2025-07-15 | Stop reason: HOSPADM

## 2025-07-11 RX ORDER — LISINOPRIL AND HYDROCHLOROTHIAZIDE 20; 25 MG/1; MG/1
TABLET ORAL
COMMUNITY

## 2025-07-11 ASSESSMENT — PAIN - FUNCTIONAL ASSESSMENT: PAIN_FUNCTIONAL_ASSESSMENT: NONE - DENIES PAIN

## (undated) DEVICE — MEDIA CONTRAST 10ML 200MG/ML 41%

## (undated) DEVICE — INTENDED FOR TISSUE SEPARATION, AND OTHER PROCEDURES THAT REQUIRE A SHARP SURGICAL BLADE TO PUNCTURE OR CUT.: Brand: BARD-PARKER SAFETY BLADES SIZE 11, STERILE

## (undated) DEVICE — NEEDLE EPI 18GA L3.5IN CLR HUB TUOHY W/ WNG PERIFIX

## (undated) DEVICE — SET EPI 18GA L3.5IN TUOHY NDL W/ 20GA CLS TIP NYL CATH

## (undated) DEVICE — GAUZE,SPONGE,4"X4",16PLY,STRL,LF,10/TRAY: Brand: MEDLINE

## (undated) DEVICE — LINER SUCT CANSTR 3000CC PLAS SFT PRE ASSEMB W/OUT TBNG W/

## (undated) DEVICE — Device

## (undated) DEVICE — SUTURE PERMAHAND SZ 2-0 L30IN NONABSORBABLE BLK SILK W/O A305H

## (undated) DEVICE — (D)SYR 10ML 1/5ML GRAD NSAF -- PKGING CHANGE USE ITEM 338027

## (undated) DEVICE — (D)BNDG ADHESIVE FABRIC 3/4X3 -- DISC BY MFR USE ITEM 357960

## (undated) DEVICE — PREP SKN CHLRAPRP APL 26ML STR --

## (undated) DEVICE — GARMENT,MEDLINE,DVT,INT,THIGH,M, GEN2: Brand: MEDLINE

## (undated) DEVICE — MEDI-VAC NON-CONDUCTIVE SUCTION TUBING: Brand: CARDINAL HEALTH

## (undated) DEVICE — SOLUTION IRRIG 1000ML H2O STRL BLT

## (undated) DEVICE — Z DISCONTINUED BY MEDLINE USE 2711682 TRAY SKIN PREP DRY W/ PREM GLV

## (undated) DEVICE — TOWEL,OR,DSP,ST,WHITE,DLX,XR,4/PK,20PK/C: Brand: MEDLINE

## (undated) DEVICE — SUTURE MCRYL SZ 2-0 L36IN ABSRB UD L36MM CT-1 1/2 CIR Y945H

## (undated) DEVICE — SUTURE PERMA-HAND SZ 3-0 L24IN NONABSORBABLE BLK W/O NDL SA74H

## (undated) DEVICE — Z DUPLICATE USE 2624755 KIT NEG PRSS DSG W/ PRSS INDIC PTCH STRP 45ML CANSTR CARR

## (undated) DEVICE — YANKAUER,SMOOTH HANDLE,HIGH CAPACITY: Brand: MEDLINE INDUSTRIES, INC.

## (undated) DEVICE — Device: Brand: MEDEX

## (undated) DEVICE — BLANKET WRM AD W50XL85.8IN PACU FULL BODY FORC AIR

## (undated) DEVICE — PENCIL ES L3M BTTN SWCH S STL HEX LOK BLDE ELECTRD HOLSTER

## (undated) DEVICE — SKIN PREP TRAY DRY SPNG

## (undated) DEVICE — SUT PROL 6-0 30IN C1 DA BLU --

## (undated) DEVICE — SYS INCISION MANAGEMENT -- PREVENA

## (undated) DEVICE — SOFT SILICONE HYDROCELLULAR SACRUM DRESSING WITH LOCK AWAY LAYER: Brand: ALLEVYN LIFE SACRUM (LARGE) PACK OF 10

## (undated) DEVICE — SUTURE MCRYL SZ 3-0 L27IN ABSRB UD L26MM SH 1/2 CIR Y416H

## (undated) DEVICE — O.R TOWEL, X-RAY DETECTABLE: Brand: DEROYAL

## (undated) DEVICE — SUTURE MCRYL SZ 4 0 L18IN ABSRB VLT PS 1 L24MM 3 8 CIR REV Y682H

## (undated) DEVICE — APPLIER CLP L9.38IN M LIG TI DISP STR RNG HNDL LIGACLP

## (undated) DEVICE — SUTURE GOR TX SZ 5-0 L30IN NONABSORBABLE L12MM TTC-12 3/8 6M10A

## (undated) DEVICE — ADHESIVE SKN CLSR HI VISC 2-O --

## (undated) DEVICE — SUTURE PROL SZ 5-0 L36IN NONABSORBABLE BLU L13MM C-1 3/8 8720H

## (undated) DEVICE — CANNULA NSL AD TBNG L14FT STD PVC O2 CRV CONN NONFLARED NSL

## (undated) DEVICE — BITE BLOCK ENDOSCP UNIV AD 6 TO 9.4 MM

## (undated) DEVICE — SYR 10ML LUER LOK 1/5ML GRAD --

## (undated) DEVICE — SOLUTION IV 1000ML 0.9% SOD CHL

## (undated) DEVICE — SYRINGE MED 50ML LUERSLIP TIP

## (undated) DEVICE — SYRINGE MED 25GA 3ML L5/8IN SUBQ PLAS W/ DETACH NDL SFTY

## (undated) DEVICE — STERILE POLYISOPRENE POWDER-FREE SURGICAL GLOVES: Brand: PROTEXIS

## (undated) DEVICE — ENDOSCOPY PUMP TUBING/ CAP SET: Brand: ERBE

## (undated) DEVICE — UNDERPAD INCONT W23XL36IN STD BLU POLYPR BK FLUF SFT

## (undated) DEVICE — DECANTER BAG 9": Brand: MEDLINE INDUSTRIES, INC.

## (undated) DEVICE — BASIN EMSIS 16OZ GRAPHITE PLAS KID SHP MOLD GRAD FOR ORAL

## (undated) DEVICE — 450 ML BOTTLE OF 0.05% CHLORHEXIDINE GLUCONATE IN 99.95% STERILE WATER FOR IRRIGATION, USP AND APPLICATOR.: Brand: IRRISEPT ANTIMICROBIAL WOUND LAVAGE

## (undated) DEVICE — GLOVE SURG SZ 7 L11.33IN FNGR THK9.8MIL STRW LTX POLYMER

## (undated) DEVICE — SOLUTION SCRB 4OZ 4% CHG CLN BASE FOR PT SKIN ANTISEPSIS

## (undated) DEVICE — BLADE ASSEMB CLP HAIR FINE --

## (undated) DEVICE — TABLE COVER: Brand: CONVERTORS

## (undated) DEVICE — SUTURE PROL SZ 7-0 L30IN NONABSORBABLE BLU L9.3MM BV-1 1/2 8703H

## (undated) DEVICE — REM POLYHESIVE ADULT PATIENT RETURN ELECTRODE: Brand: VALLEYLAB

## (undated) DEVICE — PROBE VASC 8MHZ WTRPRF

## (undated) DEVICE — TRAY,URINE METER,100% SILICONE,16FR10ML: Brand: MEDLINE

## (undated) DEVICE — HEX-LOCKING BLADE ELECTRODE: Brand: EDGE

## (undated) DEVICE — CATHETER SUCT TR FL TIP 14FR W/ O CTRL